# Patient Record
Sex: MALE | Race: WHITE | NOT HISPANIC OR LATINO | Employment: OTHER | URBAN - METROPOLITAN AREA
[De-identification: names, ages, dates, MRNs, and addresses within clinical notes are randomized per-mention and may not be internally consistent; named-entity substitution may affect disease eponyms.]

---

## 2017-01-18 RX ORDER — ACYCLOVIR 400 MG/1
TABLET ORAL
Qty: 30 TABLET | Refills: 0 | Status: SHIPPED | OUTPATIENT
Start: 2017-01-18 | End: 2017-02-17 | Stop reason: SDUPTHER

## 2017-01-18 RX ORDER — SERTRALINE HYDROCHLORIDE 100 MG/1
TABLET, FILM COATED ORAL
Qty: 30 TABLET | Refills: 0 | Status: SHIPPED | OUTPATIENT
Start: 2017-01-18 | End: 2017-02-17 | Stop reason: SDUPTHER

## 2017-02-17 ENCOUNTER — OFFICE VISIT (OUTPATIENT)
Dept: SLEEP MEDICINE | Facility: HOSPITAL | Age: 67
End: 2017-02-17

## 2017-02-17 VITALS
DIASTOLIC BLOOD PRESSURE: 72 MMHG | BODY MASS INDEX: 27.02 KG/M2 | WEIGHT: 182.4 LBS | HEART RATE: 71 BPM | HEIGHT: 69 IN | SYSTOLIC BLOOD PRESSURE: 156 MMHG | OXYGEN SATURATION: 91 %

## 2017-02-17 DIAGNOSIS — G47.34 NOCTURNAL HYPOXEMIA: ICD-10-CM

## 2017-02-17 DIAGNOSIS — G47.33 OBSTRUCTIVE SLEEP APNEA, ADULT: Primary | ICD-10-CM

## 2017-02-17 PROCEDURE — 99213 OFFICE O/P EST LOW 20 MIN: CPT | Performed by: INTERNAL MEDICINE

## 2017-02-17 RX ORDER — SERTRALINE HYDROCHLORIDE 100 MG/1
TABLET, FILM COATED ORAL
Qty: 30 TABLET | Refills: 0 | Status: SHIPPED | OUTPATIENT
Start: 2017-02-17 | End: 2017-03-03 | Stop reason: SDUPTHER

## 2017-02-17 RX ORDER — ASPIRIN 81 MG/1
81 TABLET ORAL NIGHTLY
COMMUNITY

## 2017-02-17 RX ORDER — ACYCLOVIR 400 MG/1
TABLET ORAL
Qty: 30 TABLET | Refills: 0 | Status: SHIPPED | OUTPATIENT
Start: 2017-02-17 | End: 2017-03-03 | Stop reason: SDUPTHER

## 2017-02-17 NOTE — PROGRESS NOTES
"Subjective   Enio Tapia is a 66 y.o. male is here today for follow-up.  He is seen here in follow-up of obstructive sleep apnea.  His primary care physician is Dr. Mckay.    History of Present Illness  The patient was last seen here July 1, 2016.  His previous sleep study did show severe obstructive sleep apnea with an AHI of 110.8.  He also had nocturnal hypoxemia and allergic rhinitis.  He is been doing fairly well since July.  He says he feels better on the CPAP.  He occasionally awakens with the mask leak and a dry mouth.  He's using a nasal mask.  He notes that it's difficult to use that when he has a lot of nasal congestion.  The following portions of the patient's history were reviewed and updated as appropriate: allergies, current medications and problem list.    Review of Systems   Constitutional: Negative.    HENT: Positive for congestion, postnasal drip and sinus pressure.    Eyes:        Has glaucoma     Respiratory: Positive for cough.    Cardiovascular: Positive for chest pain.   Gastrointestinal: Negative.    Endocrine: Positive for cold intolerance and heat intolerance.   Genitourinary: Negative.    Musculoskeletal: Positive for arthralgias and joint swelling.   Skin: Negative.    Allergic/Immunologic: Negative.    Neurological: Positive for headaches.   Hematological: Negative.    Psychiatric/Behavioral: Negative.     Coulter scores 19/24    Objective    Visit Vitals   • /72   • Pulse 71   • Ht 69\" (175.3 cm)   • Wt 182 lb 6.4 oz (82.7 kg)   • SpO2 91%   • BMI 26.94 kg/m2       Physical Exam   Constitutional: He is oriented to person, place, and time. He appears well-nourished.   He was overweight.   HENT:   Head: Normocephalic and atraumatic.   He had nasal airway narrowing with nasal septal deviation the left and Mallampati class II anatomy   Eyes: Pupils are equal, round, and reactive to light.   Neck: Normal range of motion. Neck supple.   Cardiovascular: Normal rate, regular rhythm " and normal heart sounds.    Pulmonary/Chest: Effort normal and breath sounds normal.   Abdominal: Soft. Bowel sounds are normal.   Musculoskeletal: Normal range of motion. He exhibits no edema.   Neurological: He is alert and oriented to person, place, and time.   Skin: Skin is warm and dry.   Psychiatric: He has a normal mood and affect. His behavior is normal.    download from his machine for the past 6 months shows he's used it 97.8% the time.  He's using it 6 hours 5 minutes per day.  His AHI remained slightly elevated 9.3 but is improved compared to his last visit.      Assessment/Plan   Enio was seen today for follow-up.    Diagnoses and all orders for this visit:    Obstructive sleep apnea, adult  -     CPAP Therapy    Nocturnal hypoxemia  -     Overnight Sleep Oximetry Study; Future     think the patient is getting better using his mask which is part of the reason that his AHI is lower this time.  We will continue on her current pressure settings.  He is encouraged to achieve ideal body weight.  We will renew his supplies.  We will also check an overnight oximetry to make certain that were correcting his nocturnal hypoxemia.  We will plan to see him in 1 year.  He is encouraged practice lateral position sleep he is encouraged to avoid alcohol and sedatives close to bedtime.    Jaskaran Romero MD [facility]  Sleep Medicine  Pulmonary and Critical Care Medicine

## 2017-02-17 NOTE — PATIENT INSTRUCTIONS
Sleep Apnea   Sleep apnea is a sleep disorder characterized by abnormal pauses in breathing while you sleep. When your breathing pauses, the level of oxygen in your blood decreases. This causes you to move out of deep sleep and into light sleep. As a result, your quality of sleep is poor, and the system that carries your blood throughout your body (cardiovascular system) experiences stress. If sleep apnea remains untreated, the following conditions can develop:  · High blood pressure (hypertension).  · Coronary artery disease.  · Inability to achieve or maintain an erection (impotence).  · Impairment of your thought process (cognitive dysfunction).  There are three types of sleep apnea:  1. Obstructive sleep apnea--Pauses in breathing during sleep because of a blocked airway.  2. Central sleep apnea--Pauses in breathing during sleep because the area of the brain that controls your breathing does not send the correct signals to the muscles that control breathing.  3. Mixed sleep apnea--A combination of both obstructive and central sleep apnea.  RISK FACTORS  The following risk factors can increase your risk of developing sleep apnea:  · Being overweight.  · Smoking.  · Having narrow passages in your nose and throat.  · Being of older age.  · Being male.  · Alcohol use.  · Sedative and tranquilizer use.  · Ethnicity. Among individuals younger than 35 years,  Americans are at increased risk of sleep apnea.  SYMPTOMS   · Difficulty staying asleep.  · Daytime sleepiness and fatigue.  · Loss of energy.  · Irritability.  · Loud, heavy snoring.  · Morning headaches.  · Trouble concentrating.  · Forgetfulness.  · Decreased interest in sex.  · Unexplained sleepiness.  DIAGNOSIS   In order to diagnose sleep apnea, your caregiver will perform a physical examination. A sleep study done in the comfort of your own home may be appropriate if you are otherwise healthy. Your caregiver may also recommend that you spend the  "night in a sleep lab. In the sleep lab, several monitors record information about your heart, lungs, and brain while you sleep. Your leg and arm movements and blood oxygen level are also recorded.  TREATMENT  The following actions may help to resolve mild sleep apnea:  · Sleeping on your side.    · Using a decongestant if you have nasal congestion.    · Avoiding the use of depressants, including alcohol, sedatives, and narcotics.    · Losing weight and modifying your diet if you are overweight.  There also are devices and treatments to help open your airway:  · Oral appliances. These are custom-made mouthpieces that shift your lower jaw forward and slightly open your bite. This opens your airway.  · Devices that create positive airway pressure. This positive pressure \"splints\" your airway open to help you breathe better during sleep. The following devices create positive airway pressure:    Continuous positive airway pressure (CPAP) device. The CPAP device creates a continuous level of air pressure with an air pump. The air is delivered to your airway through a mask while you sleep. This continuous pressure keeps your airway open.    Nasal expiratory positive airway pressure (EPAP) device. The EPAP device creates positive air pressure as you exhale. The device consists of single-use valves, which are inserted into each nostril and held in place by adhesive. The valves create very little resistance when you inhale but create much more resistance when you exhale. That increased resistance creates the positive airway pressure. This positive pressure while you exhale keeps your airway open, making it easier to breath when you inhale again.    Bilevel positive airway pressure (BPAP) device. The BPAP device is used mainly in patients with central sleep apnea. This device is similar to the CPAP device because it also uses an air pump to deliver continuous air pressure through a mask. However, with the BPAP machine, the " pressure is set at two different levels. The pressure when you exhale is lower than the pressure when you inhale.  · Surgery. Typically, surgery is only done if you cannot comply with less invasive treatments or if the less invasive treatments do not improve your condition. Surgery involves removing excess tissue in your airway to create a wider passage way.     This information is not intended to replace advice given to you by your health care provider. Make sure you discuss any questions you have with your health care provider.     Document Released: 12/08/2003 Document Revised: 01/08/2016 Document Reviewed: 09/26/2016  [employer] Interactive Patient Education ©2016 [employer]

## 2017-02-24 PROBLEM — B00.9 HERPES SIMPLEX INFECTION: Status: ACTIVE | Noted: 2017-02-24

## 2017-03-03 ENCOUNTER — OFFICE VISIT (OUTPATIENT)
Dept: FAMILY MEDICINE CLINIC | Facility: CLINIC | Age: 67
End: 2017-03-03

## 2017-03-03 VITALS
RESPIRATION RATE: 16 BRPM | DIASTOLIC BLOOD PRESSURE: 92 MMHG | SYSTOLIC BLOOD PRESSURE: 158 MMHG | HEART RATE: 65 BPM | BODY MASS INDEX: 26.6 KG/M2 | OXYGEN SATURATION: 96 % | HEIGHT: 69 IN | WEIGHT: 179.6 LBS | TEMPERATURE: 98.2 F

## 2017-03-03 DIAGNOSIS — Z00.00 WELL ADULT EXAM: Primary | ICD-10-CM

## 2017-03-03 DIAGNOSIS — I20.9 ANGINA PECTORIS (HCC): ICD-10-CM

## 2017-03-03 DIAGNOSIS — E78.49 OTHER HYPERLIPIDEMIA: ICD-10-CM

## 2017-03-03 DIAGNOSIS — I10 ESSENTIAL HYPERTENSION: ICD-10-CM

## 2017-03-03 LAB
ALBUMIN SERPL-MCNC: 4 G/DL (ref 3.2–4.8)
ALBUMIN/GLOB SERPL: 1.3 G/DL (ref 1.5–2.5)
ALP SERPL-CCNC: 59 U/L (ref 25–100)
ALT SERPL W P-5'-P-CCNC: 33 U/L (ref 7–40)
ANION GAP SERPL CALCULATED.3IONS-SCNC: 4 MMOL/L (ref 3–11)
ARTICHOKE IGE QN: 229 MG/DL (ref 0–130)
AST SERPL-CCNC: 27 U/L (ref 0–33)
BASOPHILS # BLD AUTO: 0.02 10*3/MM3 (ref 0–0.2)
BASOPHILS NFR BLD AUTO: 0.3 % (ref 0–1)
BILIRUB BLD-MCNC: NEGATIVE MG/DL
BILIRUB SERPL-MCNC: 0.6 MG/DL (ref 0.3–1.2)
BUN BLD-MCNC: 19 MG/DL (ref 9–23)
BUN/CREAT SERPL: 21.1 (ref 7–25)
CALCIUM SPEC-SCNC: 9.4 MG/DL (ref 8.7–10.4)
CHLORIDE SERPL-SCNC: 104 MMOL/L (ref 99–109)
CHOLEST SERPL-MCNC: 281 MG/DL (ref 0–200)
CLARITY, POC: CLEAR
CO2 SERPL-SCNC: 29 MMOL/L (ref 20–31)
COLOR UR: YELLOW
CREAT BLD-MCNC: 0.9 MG/DL (ref 0.6–1.3)
DEPRECATED RDW RBC AUTO: 46.1 FL (ref 37–54)
EOSINOPHIL # BLD AUTO: 0.16 10*3/MM3 (ref 0.1–0.3)
EOSINOPHIL NFR BLD AUTO: 2.8 % (ref 0–3)
ERYTHROCYTE [DISTWIDTH] IN BLOOD BY AUTOMATED COUNT: 13.5 % (ref 11.3–14.5)
GFR SERPL CREATININE-BSD FRML MDRD: 84 ML/MIN/1.73
GLOBULIN UR ELPH-MCNC: 3 GM/DL
GLUCOSE BLD-MCNC: 95 MG/DL (ref 70–100)
GLUCOSE UR STRIP-MCNC: NEGATIVE MG/DL
HCT VFR BLD AUTO: 42.8 % (ref 38.9–50.9)
HDLC SERPL-MCNC: 63 MG/DL (ref 40–60)
HGB BLD-MCNC: 14.5 G/DL (ref 13.1–17.5)
IMM GRANULOCYTES # BLD: 0.02 10*3/MM3 (ref 0–0.03)
IMM GRANULOCYTES NFR BLD: 0.3 % (ref 0–0.6)
KETONES UR QL: NEGATIVE
LEUKOCYTE EST, POC: NEGATIVE
LYMPHOCYTES # BLD AUTO: 1.15 10*3/MM3 (ref 0.6–4.8)
LYMPHOCYTES NFR BLD AUTO: 20.1 % (ref 24–44)
MCH RBC QN AUTO: 31.9 PG (ref 27–31)
MCHC RBC AUTO-ENTMCNC: 33.9 G/DL (ref 32–36)
MCV RBC AUTO: 94.1 FL (ref 80–99)
MONOCYTES # BLD AUTO: 0.48 10*3/MM3 (ref 0–1)
MONOCYTES NFR BLD AUTO: 8.4 % (ref 0–12)
NEUTROPHILS # BLD AUTO: 3.89 10*3/MM3 (ref 1.5–8.3)
NEUTROPHILS NFR BLD AUTO: 68.1 % (ref 41–71)
NITRITE UR-MCNC: NEGATIVE MG/ML
PH UR: 7.5 [PH] (ref 5–8)
PLATELET # BLD AUTO: 171 10*3/MM3 (ref 150–450)
PMV BLD AUTO: 9.4 FL (ref 6–12)
POTASSIUM BLD-SCNC: 4.5 MMOL/L (ref 3.5–5.5)
PROT SERPL-MCNC: 7 G/DL (ref 5.7–8.2)
PROT UR STRIP-MCNC: NEGATIVE MG/DL
PSA SERPL-MCNC: 4.6 NG/ML (ref 0–4)
RBC # BLD AUTO: 4.55 10*6/MM3 (ref 4.2–5.76)
RBC # UR STRIP: NEGATIVE /UL
SODIUM BLD-SCNC: 137 MMOL/L (ref 132–146)
SP GR UR: 1.02 (ref 1–1.03)
TRIGL SERPL-MCNC: 108 MG/DL (ref 0–150)
TSH SERPL DL<=0.05 MIU/L-ACNC: 4.11 MIU/ML (ref 0.35–5.35)
UROBILINOGEN UR QL: NORMAL
WBC NRBC COR # BLD: 5.72 10*3/MM3 (ref 3.5–10.8)

## 2017-03-03 PROCEDURE — 85025 COMPLETE CBC W/AUTO DIFF WBC: CPT | Performed by: FAMILY MEDICINE

## 2017-03-03 PROCEDURE — 80061 LIPID PANEL: CPT | Performed by: FAMILY MEDICINE

## 2017-03-03 PROCEDURE — 81003 URINALYSIS AUTO W/O SCOPE: CPT | Performed by: FAMILY MEDICINE

## 2017-03-03 PROCEDURE — 36415 COLL VENOUS BLD VENIPUNCTURE: CPT | Performed by: FAMILY MEDICINE

## 2017-03-03 PROCEDURE — 80053 COMPREHEN METABOLIC PANEL: CPT | Performed by: FAMILY MEDICINE

## 2017-03-03 PROCEDURE — 84153 ASSAY OF PSA TOTAL: CPT | Performed by: FAMILY MEDICINE

## 2017-03-03 PROCEDURE — 99397 PER PM REEVAL EST PAT 65+ YR: CPT | Performed by: FAMILY MEDICINE

## 2017-03-03 PROCEDURE — 84443 ASSAY THYROID STIM HORMONE: CPT | Performed by: FAMILY MEDICINE

## 2017-03-03 PROCEDURE — 93000 ELECTROCARDIOGRAM COMPLETE: CPT | Performed by: FAMILY MEDICINE

## 2017-03-03 RX ORDER — ACYCLOVIR 400 MG/1
400 TABLET ORAL DAILY
Qty: 30 TABLET | Refills: 5 | Status: SHIPPED | OUTPATIENT
Start: 2017-03-03 | End: 2017-10-15 | Stop reason: SDUPTHER

## 2017-03-03 RX ORDER — EZETIMIBE 10 MG/1
10 TABLET ORAL DAILY
Qty: 90 TABLET | Refills: 1 | Status: SHIPPED | OUTPATIENT
Start: 2017-03-03 | End: 2017-03-31

## 2017-03-03 RX ORDER — SERTRALINE HYDROCHLORIDE 100 MG/1
100 TABLET, FILM COATED ORAL DAILY
Qty: 30 TABLET | Refills: 5 | Status: SHIPPED | OUTPATIENT
Start: 2017-03-03 | End: 2017-05-12

## 2017-03-03 RX ORDER — LOSARTAN POTASSIUM 50 MG/1
50 TABLET ORAL DAILY
Qty: 90 TABLET | Refills: 1 | Status: SHIPPED | OUTPATIENT
Start: 2017-03-03 | End: 2017-09-06 | Stop reason: SDUPTHER

## 2017-03-03 NOTE — PROGRESS NOTES
"Subjective   Enio Tapia is a 66 y.o. male and is here for a comprehensive physical exam. The patient reports problems - anginal type symptoms.        Are you taking aspirin daily? yes        The following portions of the patient's history were reviewed and updated as appropriate: allergies, current medications, past family history, past medical history, past social history, past surgical history and problem list.    Review of Systems  Do you have pain that bothers you in your daily life? no  A comprehensive review of systems was negative.    Objective   Visit Vitals   • /92   • Pulse 65   • Temp 98.2 °F (36.8 °C)   • Resp 16   • Ht 69\" (175.3 cm)   • Wt 179 lb 9.6 oz (81.5 kg)   • SpO2 96%   • BMI 26.52 kg/m2       General Appearance:    Alert, cooperative, no distress, appears stated age   Head:    Normocephalic, without obvious abnormality, atraumatic   Eyes:    PERRL, conjunctiva/corneas clear, EOM's intact, fundi     benign, both eyes        Ears:    Normal TM's and external ear canals, both ears   Nose:   Nares normal, septum midline, mucosa normal, no drainage    or sinus tenderness   Throat:   Lips, mucosa, and tongue normal; teeth and gums normal   Neck:   Supple, symmetrical, trachea midline, no adenopathy;        thyroid:  No enlargement/tenderness/nodules; no carotid    bruit or JVD   Back:     Symmetric, no curvature, ROM normal, no CVA tenderness   Lungs:     Clear to auscultation bilaterally, respirations unlabored   Chest wall:    No tenderness or deformity   Heart:    Regular rate and rhythm, S1 and S2 normal, no murmur, rub   or gallop   Abdomen:     Soft, non-tender, bowel sounds active all four quadrants,     no masses, no organomegaly   Genitalia:    Normal male without lesion, discharge or tenderness   Rectal:    deferred   Extremities:   Extremities normal, atraumatic, no cyanosis or edema bunions moderate   Pulses:   2+ and symmetric all extremities   Skin:   Skin color, texture, " turgor normal, no rashes or lesions nail mycosis at edges bid toenail right    Lymph nodes:   Cervical, supraclavicular, and axillary nodes normal   Neurologic:   CNII-XII intact. Normal strength, sensation and reflexes       throughout        Assessment/Plan   Healthy male exam.       1. Hypertension, hyperlipidemia, hypothyroidism, general anxiety disorder, and coronary artery disease.  2. Patient Counseling:  --Nutrition: Stressed importance of moderation in sodium/caffeine intake, saturated fat and cholesterol, caloric balance, sufficient intake of fresh fruits, vegetables, fiber, calcium, iron, and 1 mg of folate supplement per day (for females capable of pregnancy).  --Discussed the issue of estrogen replacement, calcium supplement, and the daily use of baby aspirin.  --Exercise: Stressed the importance of regular exercise.   --Substance Abuse: Discussed cessation/primary prevention of tobacco, alcohol, or other drug use; driving or other dangerous activities under the influence; availability of treatment for abuse.    --Sexuality: Discussed sexually transmitted diseases, partner selection, use of condoms, avoidance of unintended pregnancy  and contraceptive alternatives.   --Injury prevention: Discussed safety belts, safety helmets, smoke detector, smoking near bedding or upholstery.   --Dental health: Discussed importance of regular tooth brushing, flossing, and dental visits.  --Immunizations reviewed.  --Discussed benefits of screening colonoscopy.  --After hours service discussed with patient    3. Discussed the patient's BMI with him.  The BMI is in the acceptable range  4. Follow up in 3 months      ECG 12 Lead  Date/Time: 3/3/2017 4:56 PM  Performed by: SHAINA MURGUIA  Authorized by: SHAINA MURGUIA   Comparison: not compared with previous ECG   Rhythm: sinus rhythm  Rate: normal  BPM: 58  ST Segments: ST segments normal  T flattening: II, III and aVF  QRS axis: left  Other: no other  findings  Clinical impression: non-specific ECG  Comments: Angina          See cpx form

## 2017-03-31 ENCOUNTER — APPOINTMENT (OUTPATIENT)
Dept: CARDIOLOGY | Facility: HOSPITAL | Age: 67
End: 2017-03-31
Attending: INTERNAL MEDICINE

## 2017-03-31 ENCOUNTER — CONSULT (OUTPATIENT)
Dept: CARDIOLOGY | Facility: CLINIC | Age: 67
End: 2017-03-31

## 2017-03-31 ENCOUNTER — HOSPITAL ENCOUNTER (OUTPATIENT)
Facility: HOSPITAL | Age: 67
Setting detail: OBSERVATION
Discharge: HOME | End: 2017-04-01
Attending: INTERNAL MEDICINE | Admitting: INTERNAL MEDICINE

## 2017-03-31 ENCOUNTER — PREP FOR SURGERY (OUTPATIENT)
Dept: CARDIOLOGY | Facility: CLINIC | Age: 67
End: 2017-03-31

## 2017-03-31 VITALS
WEIGHT: 182 LBS | HEART RATE: 61 BPM | BODY MASS INDEX: 26.96 KG/M2 | SYSTOLIC BLOOD PRESSURE: 146 MMHG | DIASTOLIC BLOOD PRESSURE: 94 MMHG | HEIGHT: 69 IN

## 2017-03-31 DIAGNOSIS — E78.2 MIXED HYPERLIPIDEMIA: ICD-10-CM

## 2017-03-31 DIAGNOSIS — I10 ESSENTIAL HYPERTENSION: ICD-10-CM

## 2017-03-31 DIAGNOSIS — I25.10 CORONARY ARTERY DISEASE INVOLVING NATIVE CORONARY ARTERY OF NATIVE HEART WITHOUT ANGINA PECTORIS: Primary | ICD-10-CM

## 2017-03-31 DIAGNOSIS — I25.110 CORONARY ARTERY DISEASE INVOLVING NATIVE CORONARY ARTERY OF NATIVE HEART WITH UNSTABLE ANGINA PECTORIS (HCC): ICD-10-CM

## 2017-03-31 DIAGNOSIS — I25.110 CORONARY ARTERY DISEASE INVOLVING NATIVE CORONARY ARTERY OF NATIVE HEART WITH UNSTABLE ANGINA PECTORIS (HCC): Primary | ICD-10-CM

## 2017-03-31 LAB
ANION GAP SERPL CALCULATED.3IONS-SCNC: 5 MMOL/L (ref 3–11)
ARTICHOKE IGE QN: 161 MG/DL (ref 0–130)
BH CV ECHO MEAS - AO ROOT AREA (BSA CORRECTED): 1.8
BH CV ECHO MEAS - AO ROOT AREA: 10.2 CM^2
BH CV ECHO MEAS - AO ROOT DIAM: 3.6 CM
BH CV ECHO MEAS - BSA(HAYCOCK): 2 M^2
BH CV ECHO MEAS - BSA: 2 M^2
BH CV ECHO MEAS - BZI_BMI: 26.6 KILOGRAMS/M^2
BH CV ECHO MEAS - BZI_METRIC_HEIGHT: 175.3 CM
BH CV ECHO MEAS - BZI_METRIC_WEIGHT: 81.6 KG
BH CV ECHO MEAS - CONTRAST EF (2CH): 55.1 ML/M^2
BH CV ECHO MEAS - CONTRAST EF 4CH: 55.7 ML/M^2
BH CV ECHO MEAS - EDV(CUBED): 148.9 ML
BH CV ECHO MEAS - EDV(MOD-SP2): 98 ML
BH CV ECHO MEAS - EDV(MOD-SP4): 97 ML
BH CV ECHO MEAS - EDV(TEICH): 135.3 ML
BH CV ECHO MEAS - EF(CUBED): 56.4 %
BH CV ECHO MEAS - EF(MOD-SP2): 55.1 %
BH CV ECHO MEAS - EF(MOD-SP4): 55.7 %
BH CV ECHO MEAS - EF(TEICH): 47.7 %
BH CV ECHO MEAS - ESV(CUBED): 65 ML
BH CV ECHO MEAS - ESV(MOD-SP2): 44 ML
BH CV ECHO MEAS - ESV(MOD-SP4): 43 ML
BH CV ECHO MEAS - ESV(TEICH): 70.8 ML
BH CV ECHO MEAS - FS: 24.2 %
BH CV ECHO MEAS - IVS/LVPW: 1
BH CV ECHO MEAS - IVSD: 1.1 CM
BH CV ECHO MEAS - LA DIMENSION: 4.4 CM
BH CV ECHO MEAS - LA/AO: 1.2
BH CV ECHO MEAS - LAT PEAK E' VEL: 7.5 CM/SEC
BH CV ECHO MEAS - LV DIASTOLIC VOL/BSA (35-75): 49.1 ML/M^2
BH CV ECHO MEAS - LV MASS(C)D: 223.5 GRAMS
BH CV ECHO MEAS - LV MASS(C)DI: 113.2 GRAMS/M^2
BH CV ECHO MEAS - LV MAX PG: 1.9 MMHG
BH CV ECHO MEAS - LV MEAN PG: 1 MMHG
BH CV ECHO MEAS - LV SYSTOLIC VOL/BSA (12-30): 21.8 ML/M^2
BH CV ECHO MEAS - LV V1 MAX: 69.2 CM/SEC
BH CV ECHO MEAS - LV V1 MEAN: 44.4 CM/SEC
BH CV ECHO MEAS - LV V1 VTI: 18.3 CM
BH CV ECHO MEAS - LVIDD: 5.3 CM
BH CV ECHO MEAS - LVIDS: 4 CM
BH CV ECHO MEAS - LVLD AP2: 7.7 CM
BH CV ECHO MEAS - LVLD AP4: 8.1 CM
BH CV ECHO MEAS - LVLS AP2: 6.2 CM
BH CV ECHO MEAS - LVLS AP4: 6.5 CM
BH CV ECHO MEAS - LVOT AREA (M): 3.5 CM^2
BH CV ECHO MEAS - LVOT AREA: 3.5 CM^2
BH CV ECHO MEAS - LVOT DIAM: 2.1 CM
BH CV ECHO MEAS - LVPWD: 1.1 CM
BH CV ECHO MEAS - MED PEAK E' VEL: 7.25 CM/SEC
BH CV ECHO MEAS - MV A MAX VEL: 79 CM/SEC
BH CV ECHO MEAS - MV E MAX VEL: 53.8 CM/SEC
BH CV ECHO MEAS - MV E/A: 0.68
BH CV ECHO MEAS - PA ACC SLOPE: 732 CM/SEC^2
BH CV ECHO MEAS - PA ACC TIME: 0.09 SEC
BH CV ECHO MEAS - PA PR(ACCEL): 37.6 MMHG
BH CV ECHO MEAS - RVDD: 2.8 CM
BH CV ECHO MEAS - SI(CUBED): 42.5 ML/M^2
BH CV ECHO MEAS - SI(LVOT): 32.1 ML/M^2
BH CV ECHO MEAS - SI(MOD-SP2): 27.3 ML/M^2
BH CV ECHO MEAS - SI(MOD-SP4): 27.3 ML/M^2
BH CV ECHO MEAS - SI(TEICH): 32.7 ML/M^2
BH CV ECHO MEAS - SV(CUBED): 83.9 ML
BH CV ECHO MEAS - SV(LVOT): 63.4 ML
BH CV ECHO MEAS - SV(MOD-SP2): 54 ML
BH CV ECHO MEAS - SV(MOD-SP4): 54 ML
BH CV ECHO MEAS - SV(TEICH): 64.5 ML
BH CV ECHO MEAS - TAPSE (>1.6): 2.3 CM2
BH CV XLRA - RV BASE: 2.7 CM
BH CV XLRA - RV LENGTH: 6.7 CM
BH CV XLRA - RV MID: 3 CM
BH CV XLRA - TDI S': 15.8 CM/SEC
BUN BLD-MCNC: 14 MG/DL (ref 9–23)
BUN/CREAT SERPL: 17.5 (ref 7–25)
CALCIUM SPEC-SCNC: 9.3 MG/DL (ref 8.7–10.4)
CHLORIDE SERPL-SCNC: 108 MMOL/L (ref 99–109)
CHOLEST SERPL-MCNC: 229 MG/DL (ref 0–200)
CO2 SERPL-SCNC: 25 MMOL/L (ref 20–31)
CREAT BLD-MCNC: 0.8 MG/DL (ref 0.6–1.3)
DEPRECATED RDW RBC AUTO: 46 FL (ref 37–54)
E/E' RATIO: 7.2
ERYTHROCYTE [DISTWIDTH] IN BLOOD BY AUTOMATED COUNT: 13.3 % (ref 11.3–14.5)
GFR SERPL CREATININE-BSD FRML MDRD: 96 ML/MIN/1.73
GLUCOSE BLD-MCNC: 96 MG/DL (ref 70–100)
HBA1C MFR BLD: 5.7 % (ref 4.8–5.6)
HCT VFR BLD AUTO: 40.4 % (ref 38.9–50.9)
HDLC SERPL-MCNC: 53 MG/DL (ref 40–60)
HGB BLD-MCNC: 13.8 G/DL (ref 13.1–17.5)
LV EF 2D ECHO EST: 55 %
MCH RBC QN AUTO: 32.3 PG (ref 27–31)
MCHC RBC AUTO-ENTMCNC: 34.2 G/DL (ref 32–36)
MCV RBC AUTO: 94.6 FL (ref 80–99)
PLATELET # BLD AUTO: 148 10*3/MM3 (ref 150–450)
PMV BLD AUTO: 8.9 FL (ref 6–12)
POTASSIUM BLD-SCNC: 4.2 MMOL/L (ref 3.5–5.5)
RBC # BLD AUTO: 4.27 10*6/MM3 (ref 4.2–5.76)
SODIUM BLD-SCNC: 138 MMOL/L (ref 132–146)
TRIGL SERPL-MCNC: 114 MG/DL (ref 0–150)
WBC NRBC COR # BLD: 3.68 10*3/MM3 (ref 3.5–10.8)

## 2017-03-31 PROCEDURE — 92978 ENDOLUMINL IVUS OCT C 1ST: CPT | Performed by: INTERNAL MEDICINE

## 2017-03-31 PROCEDURE — 80048 BASIC METABOLIC PNL TOTAL CA: CPT | Performed by: INTERNAL MEDICINE

## 2017-03-31 PROCEDURE — 85027 COMPLETE CBC AUTOMATED: CPT | Performed by: INTERNAL MEDICINE

## 2017-03-31 PROCEDURE — 93306 TTE W/DOPPLER COMPLETE: CPT | Performed by: INTERNAL MEDICINE

## 2017-03-31 PROCEDURE — 25010000002 BIVALIRUDIN PER 1 MG: Performed by: INTERNAL MEDICINE

## 2017-03-31 PROCEDURE — 25010000002 FENTANYL CITRATE (PF) 100 MCG/2ML SOLUTION: Performed by: INTERNAL MEDICINE

## 2017-03-31 PROCEDURE — 93306 TTE W/DOPPLER COMPLETE: CPT

## 2017-03-31 PROCEDURE — C1874 STENT, COATED/COV W/DEL SYS: HCPCS | Performed by: INTERNAL MEDICINE

## 2017-03-31 PROCEDURE — C1725 CATH, TRANSLUMIN NON-LASER: HCPCS | Performed by: INTERNAL MEDICINE

## 2017-03-31 PROCEDURE — C1894 INTRO/SHEATH, NON-LASER: HCPCS | Performed by: INTERNAL MEDICINE

## 2017-03-31 PROCEDURE — C1753 CATH, INTRAVAS ULTRASOUND: HCPCS | Performed by: INTERNAL MEDICINE

## 2017-03-31 PROCEDURE — 25010000002 MIDAZOLAM PER 1 MG: Performed by: INTERNAL MEDICINE

## 2017-03-31 PROCEDURE — 36415 COLL VENOUS BLD VENIPUNCTURE: CPT

## 2017-03-31 PROCEDURE — 80061 LIPID PANEL: CPT | Performed by: INTERNAL MEDICINE

## 2017-03-31 PROCEDURE — C1887 CATHETER, GUIDING: HCPCS | Performed by: INTERNAL MEDICINE

## 2017-03-31 PROCEDURE — 92929 PR PRQ TRLUML CORONARY STENT W/ANGIO ADDL ART/BRNCH: CPT | Performed by: INTERNAL MEDICINE

## 2017-03-31 PROCEDURE — G0378 HOSPITAL OBSERVATION PER HR: HCPCS

## 2017-03-31 PROCEDURE — 25010000002 HEPARIN (PORCINE) PER 1000 UNITS: Performed by: INTERNAL MEDICINE

## 2017-03-31 PROCEDURE — 93458 L HRT ARTERY/VENTRICLE ANGIO: CPT | Performed by: INTERNAL MEDICINE

## 2017-03-31 PROCEDURE — 99243 OFF/OP CNSLTJ NEW/EST LOW 30: CPT | Performed by: INTERNAL MEDICINE

## 2017-03-31 PROCEDURE — C1769 GUIDE WIRE: HCPCS | Performed by: INTERNAL MEDICINE

## 2017-03-31 PROCEDURE — C9601 PERC DRUG-EL COR STENT BRAN: HCPCS | Performed by: INTERNAL MEDICINE

## 2017-03-31 PROCEDURE — C9600 PERC DRUG-EL COR STENT SING: HCPCS | Performed by: INTERNAL MEDICINE

## 2017-03-31 PROCEDURE — 0 IOPAMIDOL PER 1 ML: Performed by: INTERNAL MEDICINE

## 2017-03-31 PROCEDURE — 92928 PRQ TCAT PLMT NTRAC ST 1 LES: CPT | Performed by: INTERNAL MEDICINE

## 2017-03-31 PROCEDURE — 83036 HEMOGLOBIN GLYCOSYLATED A1C: CPT | Performed by: INTERNAL MEDICINE

## 2017-03-31 DEVICE — XIENCE ALPINE EVEROLIMUS ELUTING CORONARY STENT SYSTEM 3.00 MM X 33 MM / RAPID-EXCHANGE
Type: IMPLANTABLE DEVICE | Status: FUNCTIONAL
Brand: XIENCE ALPINE

## 2017-03-31 DEVICE — XIENCE ALPINE EVEROLIMUS ELUTING CORONARY STENT SYSTEM 2.25 MM X 15 MM / RAPID-EXCHANGE
Type: IMPLANTABLE DEVICE | Status: FUNCTIONAL
Brand: XIENCE ALPINE

## 2017-03-31 RX ORDER — NITROGLYCERIN 0.4 MG/1
0.4 TABLET SUBLINGUAL
Status: CANCELLED | OUTPATIENT
Start: 2017-03-31

## 2017-03-31 RX ORDER — SODIUM CHLORIDE 0.9 % (FLUSH) 0.9 %
1-10 SYRINGE (ML) INJECTION AS NEEDED
Status: CANCELLED | OUTPATIENT
Start: 2017-03-31

## 2017-03-31 RX ORDER — ACETAMINOPHEN 325 MG/1
650 TABLET ORAL EVERY 6 HOURS PRN
Status: DISCONTINUED | OUTPATIENT
Start: 2017-03-31 | End: 2017-04-01 | Stop reason: HOSPADM

## 2017-03-31 RX ORDER — ONDANSETRON 2 MG/ML
4 INJECTION INTRAMUSCULAR; INTRAVENOUS EVERY 6 HOURS PRN
Status: CANCELLED | OUTPATIENT
Start: 2017-03-31

## 2017-03-31 RX ORDER — ASPIRIN 81 MG/1
81 TABLET, CHEWABLE ORAL DAILY
Status: DISCONTINUED | OUTPATIENT
Start: 2017-04-01 | End: 2017-04-01 | Stop reason: HOSPADM

## 2017-03-31 RX ORDER — CLOPIDOGREL BISULFATE 75 MG/1
75 TABLET ORAL DAILY
Status: DISCONTINUED | OUTPATIENT
Start: 2017-04-01 | End: 2017-04-01 | Stop reason: HOSPADM

## 2017-03-31 RX ORDER — CLOPIDOGREL BISULFATE 75 MG/1
TABLET ORAL AS NEEDED
Status: DISCONTINUED | OUTPATIENT
Start: 2017-03-31 | End: 2017-03-31 | Stop reason: HOSPADM

## 2017-03-31 RX ORDER — ASPIRIN 325 MG
TABLET ORAL AS NEEDED
Status: DISCONTINUED | OUTPATIENT
Start: 2017-03-31 | End: 2017-03-31 | Stop reason: HOSPADM

## 2017-03-31 RX ORDER — ACETAMINOPHEN 325 MG/1
650 TABLET ORAL EVERY 4 HOURS PRN
Status: DISCONTINUED | OUTPATIENT
Start: 2017-03-31 | End: 2017-03-31 | Stop reason: HOSPADM

## 2017-03-31 RX ORDER — TRAVOPROST OPHTHALMIC SOLUTION 0.04 MG/ML
1 SOLUTION OPHTHALMIC NIGHTLY
Status: DISCONTINUED | OUTPATIENT
Start: 2017-03-31 | End: 2017-04-01 | Stop reason: HOSPADM

## 2017-03-31 RX ORDER — NITROGLYCERIN 5 MG/ML
INJECTION, SOLUTION INTRAVENOUS AS NEEDED
Status: DISCONTINUED | OUTPATIENT
Start: 2017-03-31 | End: 2017-03-31 | Stop reason: HOSPADM

## 2017-03-31 RX ORDER — HYDROCODONE BITARTRATE AND ACETAMINOPHEN 5; 325 MG/1; MG/1
1 TABLET ORAL EVERY 4 HOURS PRN
Status: CANCELLED | OUTPATIENT
Start: 2017-03-31 | End: 2017-04-10

## 2017-03-31 RX ORDER — SERTRALINE HYDROCHLORIDE 100 MG/1
100 TABLET, FILM COATED ORAL DAILY
Status: DISCONTINUED | OUTPATIENT
Start: 2017-03-31 | End: 2017-04-01 | Stop reason: HOSPADM

## 2017-03-31 RX ORDER — ACYCLOVIR 200 MG/1
400 CAPSULE ORAL DAILY
Status: DISCONTINUED | OUTPATIENT
Start: 2017-03-31 | End: 2017-04-01 | Stop reason: HOSPADM

## 2017-03-31 RX ORDER — ACETAMINOPHEN 325 MG/1
650 TABLET ORAL EVERY 4 HOURS PRN
Status: CANCELLED | OUTPATIENT
Start: 2017-03-31

## 2017-03-31 RX ORDER — ONDANSETRON 2 MG/ML
4 INJECTION INTRAMUSCULAR; INTRAVENOUS EVERY 6 HOURS PRN
Status: DISCONTINUED | OUTPATIENT
Start: 2017-03-31 | End: 2017-03-31 | Stop reason: HOSPADM

## 2017-03-31 RX ORDER — SODIUM CHLORIDE 0.9 % (FLUSH) 0.9 %
1-10 SYRINGE (ML) INJECTION AS NEEDED
Status: DISCONTINUED | OUTPATIENT
Start: 2017-03-31 | End: 2017-03-31 | Stop reason: HOSPADM

## 2017-03-31 RX ORDER — LIDOCAINE HYDROCHLORIDE 10 MG/ML
INJECTION, SOLUTION INFILTRATION; PERINEURAL AS NEEDED
Status: DISCONTINUED | OUTPATIENT
Start: 2017-03-31 | End: 2017-03-31 | Stop reason: HOSPADM

## 2017-03-31 RX ORDER — LEVOTHYROXINE SODIUM 0.15 MG/1
150 TABLET ORAL DAILY
Status: DISCONTINUED | OUTPATIENT
Start: 2017-03-31 | End: 2017-04-01 | Stop reason: HOSPADM

## 2017-03-31 RX ORDER — FENTANYL CITRATE 50 UG/ML
INJECTION, SOLUTION INTRAMUSCULAR; INTRAVENOUS AS NEEDED
Status: DISCONTINUED | OUTPATIENT
Start: 2017-03-31 | End: 2017-03-31 | Stop reason: HOSPADM

## 2017-03-31 RX ORDER — MIDAZOLAM HYDROCHLORIDE 1 MG/ML
INJECTION INTRAMUSCULAR; INTRAVENOUS AS NEEDED
Status: DISCONTINUED | OUTPATIENT
Start: 2017-03-31 | End: 2017-03-31 | Stop reason: HOSPADM

## 2017-03-31 RX ORDER — LOSARTAN POTASSIUM 50 MG/1
50 TABLET ORAL DAILY
Status: DISCONTINUED | OUTPATIENT
Start: 2017-04-01 | End: 2017-04-01 | Stop reason: HOSPADM

## 2017-03-31 RX ORDER — HYDROCODONE BITARTRATE AND ACETAMINOPHEN 5; 325 MG/1; MG/1
1 TABLET ORAL EVERY 4 HOURS PRN
Status: DISCONTINUED | OUTPATIENT
Start: 2017-03-31 | End: 2017-03-31 | Stop reason: HOSPADM

## 2017-03-31 RX ORDER — NITROGLYCERIN 0.4 MG/1
0.4 TABLET SUBLINGUAL
Status: DISCONTINUED | OUTPATIENT
Start: 2017-03-31 | End: 2017-03-31 | Stop reason: HOSPADM

## 2017-03-31 RX ADMIN — ACYCLOVIR 400 MG: 200 CAPSULE ORAL at 16:58

## 2017-03-31 RX ADMIN — TRAVOPROST 1 DROP: 0.04 SOLUTION/ DROPS OPHTHALMIC at 20:17

## 2017-03-31 RX ADMIN — LEVOTHYROXINE SODIUM 150 MCG: 150 TABLET ORAL at 16:58

## 2017-03-31 RX ADMIN — SERTRALINE HYDROCHLORIDE 100 MG: 100 TABLET, FILM COATED ORAL at 16:58

## 2017-03-31 RX ADMIN — ACETAMINOPHEN 650 MG: 325 TABLET, FILM COATED ORAL at 17:14

## 2017-03-31 NOTE — PROGRESS NOTES
"Enio Tapia is a 66 y.o. male.    Patient Active Problem List   Diagnosis   • Hyperlipidemia   • Hypertension   • Hypothyroidism   • Irritable bowel syndrome   • Sleep apnea   • Generalized anxiety disorder   • Inguinal hernia   • Herpes simplex infection        Chief Complaint:    History of Present Illness     {Common H&P Review Areas:40718}    Allergies   Allergen Reactions   • Statins Myalgia         Current Outpatient Prescriptions:   •  acyclovir (ZOVIRAX) 400 MG tablet, Take 1 tablet by mouth Daily. Take no more than 5 doses a day., Disp: 30 tablet, Rfl: 5  •  aspirin 81 MG EC tablet, Take 81 mg by mouth Daily., Disp: , Rfl:   •  levothyroxine (SYNTHROID, LEVOTHROID) 150 MCG tablet, Take 1 tablet by mouth daily., Disp: 90 tablet, Rfl: 3  •  losartan (COZAAR) 50 MG tablet, Take 1 tablet by mouth Daily., Disp: 90 tablet, Rfl: 1  •  sertraline (ZOLOFT) 100 MG tablet, Take 1 tablet by mouth Daily., Disp: 30 tablet, Rfl: 5  •  travoprost, PAUL free, (TRAVATAN) 0.004 % solution ophthalmic solution, 1 drop every evening. in affected eye(s), Disp: , Rfl:     ROS    Objective      Blood pressure 146/94, pulse 61, height 69\" (175.3 cm), weight 182 lb (82.6 kg).    Physical Exam    Procedures      Assessment:    No diagnosis found.       Plan:      "

## 2017-03-31 NOTE — PROGRESS NOTES
Encounter Date:03/31/2017    Location: [facility]    Ref: Scot Mckay MD    Patient ID: Enio Tapia is a 66 y.o. male resident of [place], [place].    1950  Subjective:      Chief Complaint/Reason for visit:    Chief Complaint   Patient presents with   • Coronary Artery Disease   • Hypertension       Problem List:  1. Coronary Artery DIsease      A. S/P 3.5 X 32 mm Taxus JOHANA mid RCA lesion 10/15/2004      B. Normal Stress echo 2/1/10      C. Echocardiogram 2/1/10 revealing Nl LVEF 60 %, trace TR, trace MR      D. Chest pressure and shortness of breath with exertional activities X 6 months  2. Hypertension  3. Hyperlipidemia  4. Hypothyroidism  5. Anxiety Disorder  6. Glaucoma  7. Osteoarthritis  8. Obstructive Sleep Apnea with CPAP compliance      HPI:  Mr. Tapia is a 66 yr old white male with the above noted medical history who presents in consultation referred by Scot Mckay for further evaluation of his coronary artery disease, hypertension and hyperlipidemia.  Mr. Tapia states that for the last 6 months if he exerts himself by walking briskly he will develop chest pressure with associated shortness of breath.  These symptoms are similar to what he had prior to his stent placement in 2004, but not as severe. He also notes mild chest discomfort at rest on a daily basis and occasional sharp pains with exertion that radiate into his left elbow.  His blood pressure has not been well controlled over the last several months and his Losartan was increased from 25 mg to 50 mg 3 weeks ago.  Since that time his systolic has decreased from the 150's to the 140's, but his diastolic has remained in the upper 80's to 90's. He has been intolerant to high dose Lipitor and Crestor in the past and is not currently on a statin.   Of our evaluation in the office of his complaining of localized precordial chest discomfort which he reports is mild.  States that he has been getting used to it lately and  experiences it off and on without any exertion.  However if he exerts his discomfort gets much worse.  He has not taken any nitroglycerin.      Social History     Social History   • Marital status:      Spouse name: N/A   • Number of children: N/A   • Years of education: N/A     Occupational History   • Not on file.     Social History Main Topics   • Smoking status: Never Smoker   • Smokeless tobacco: Former User     Types: Chew, Snuff     Quit date: 01/2002   • Alcohol use 7.2 oz/week     12 Cans of beer per week   • Drug use: No   • Sexual activity: Not on file     Other Topics Concern   • Not on file     Social History Narrative       family history includes Heart disease in his mother; Heart failure in his mother; Pneumonia in his father.     has a past medical history of Arrhythmia; Arthritis; Coronary artery disease; Coronary artery disease involving native coronary artery of native heart with unstable angina pectoris (3/31/2017); Disease of thyroid gland; Hyperlipidemia; Hypertension; and Mental disorder.    Allergies   Allergen Reactions   • Statins Myalgia         Current Outpatient Prescriptions:   •  acyclovir (ZOVIRAX) 400 MG tablet, Take 1 tablet by mouth Daily. Take no more than 5 doses a day., Disp: 30 tablet, Rfl: 5  •  aspirin 81 MG EC tablet, Take 81 mg by mouth Daily., Disp: , Rfl:   •  levothyroxine (SYNTHROID, LEVOTHROID) 150 MCG tablet, Take 1 tablet by mouth daily., Disp: 90 tablet, Rfl: 3  •  losartan (COZAAR) 50 MG tablet, Take 1 tablet by mouth Daily., Disp: 90 tablet, Rfl: 1  •  sertraline (ZOLOFT) 100 MG tablet, Take 1 tablet by mouth Daily., Disp: 30 tablet, Rfl: 5  •  travoprost, PAUL free, (TRAVATAN) 0.004 % solution ophthalmic solution, 1 drop every evening. in affected eye(s), Disp: , Rfl:     Review of Systems   Constitution: Negative.   HENT: Negative.    Eyes:        Glaucoma   Cardiovascular: Positive for chest pain and dyspnea on exertion. Negative for claudication,  cyanosis, irregular heartbeat, leg swelling, near-syncope, orthopnea, palpitations, paroxysmal nocturnal dyspnea and syncope.   Respiratory: Negative.    Endocrine: Negative.    Hematologic/Lymphatic: Negative.    Skin: Negative.    Musculoskeletal: Positive for arthritis and joint pain.   Gastrointestinal: Positive for diarrhea.   Genitourinary: Negative.    Neurological: Negative.    Psychiatric/Behavioral: Positive for depression. The patient is nervous/anxious.    Allergic/Immunologic: Negative.        Vitals:    03/31/17 0837   BP: 146/94   Pulse: 61   weight 182 lbs  BMI 26.9      Objective:       Physical Exam   Constitutional: He is oriented to person, place, and time. He appears well-developed and well-nourished.   HENT:   Head: Normocephalic and atraumatic.   Neck: Normal range of motion. No thyromegaly present.   Cardiovascular: Normal rate and regular rhythm.  Exam reveals no gallop and no friction rub.    No murmur heard.  Pulmonary/Chest: Effort normal and breath sounds normal.   Abdominal: Soft. Bowel sounds are normal.   Musculoskeletal: Normal range of motion. He exhibits no edema.   Neurological: He is alert and oriented to person, place, and time.   Skin: Skin is warm and dry.   Psychiatric: He has a normal mood and affect. His behavior is normal. Judgment and thought content normal.   Nursing note and vitals reviewed.      Data Review:     ECG 12 Lead  Date/Time: 3/31/2017 8:49 AM  Performed by: ISIS ROSE  Authorized by: ISIS ROSE   Comparison: compared with previous ECG from 3/3/2017  Comparison to previous ECG: Inferior t wave inversions and ST changes consistent with ischemia    Rhythm: sinus rhythm  Rate: normal  BPM: 61  Conduction: conduction normal  ST Segments: ST segments normal  ST Elevation: aVL  T depression: III  QRS axis: normal  Other: no other findings  Clinical impression: abnormal ECG          Assessment:      Diagnosis Plan     ECG 12 Lead   1. Coronary artery disease  involving native coronary artery of native heart with unstable angina pectoris     2 Essential hypertension     3. Mixed hyperlipidemia       Plan:   His EKG is showing inferior ischemia and is different compared to his EKG done earlier in the month, he has chest discomfort at present, overall clinical picture is consistent with unstable angina.  I have recommended cardiac catheterization study for further evaluation, this will be followed by catheter-based intervention depending on findings.  This is to be scheduled for later today with Dr. Benavidez.  Further management will be based on findings of the procedure.  The procedure was explained to the patient/family extensively. Indications, benefits, risks and alternatives were discussed. The patient understands well, and wishes to proceed.   Thank you for allowing us to participate in the care of your patient.     Scribed for Candy Ribeiro MD by Meri Vu. 3/31/2017  9:05 AM     I, Candy Ribeiro MD, personally performed the services described in this documentation as scribed by the above named individual in my presence, and it is both accurate and complete.  3/31/2017  9:31 AM            Please note that portions of this note may have been completed with a voice recognition program. Efforts were made to edit the dictations, but occasionally words are mistranscribed.

## 2017-04-01 VITALS
WEIGHT: 180.12 LBS | HEART RATE: 51 BPM | BODY MASS INDEX: 26.68 KG/M2 | OXYGEN SATURATION: 96 % | SYSTOLIC BLOOD PRESSURE: 131 MMHG | HEIGHT: 69 IN | DIASTOLIC BLOOD PRESSURE: 89 MMHG | RESPIRATION RATE: 22 BRPM | TEMPERATURE: 98 F

## 2017-04-01 LAB
ANION GAP SERPL CALCULATED.3IONS-SCNC: 7 MMOL/L (ref 3–11)
ARTICHOKE IGE QN: 154 MG/DL (ref 0–130)
BUN BLD-MCNC: 17 MG/DL (ref 9–23)
BUN/CREAT SERPL: 18.9 (ref 7–25)
CALCIUM SPEC-SCNC: 9.8 MG/DL (ref 8.7–10.4)
CHLORIDE SERPL-SCNC: 104 MMOL/L (ref 99–109)
CHOLEST SERPL-MCNC: 236 MG/DL (ref 0–200)
CO2 SERPL-SCNC: 26 MMOL/L (ref 20–31)
CREAT BLD-MCNC: 0.9 MG/DL (ref 0.6–1.3)
DEPRECATED RDW RBC AUTO: 43.9 FL (ref 37–54)
ERYTHROCYTE [DISTWIDTH] IN BLOOD BY AUTOMATED COUNT: 13.1 % (ref 11.3–14.5)
GFR SERPL CREATININE-BSD FRML MDRD: 84 ML/MIN/1.73
GLUCOSE BLD-MCNC: 96 MG/DL (ref 70–100)
HCT VFR BLD AUTO: 39 % (ref 38.9–50.9)
HDLC SERPL-MCNC: 47 MG/DL (ref 40–60)
HGB BLD-MCNC: 13.5 G/DL (ref 13.1–17.5)
MCH RBC QN AUTO: 31.7 PG (ref 27–31)
MCHC RBC AUTO-ENTMCNC: 34.6 G/DL (ref 32–36)
MCV RBC AUTO: 91.5 FL (ref 80–99)
PLATELET # BLD AUTO: 148 10*3/MM3 (ref 150–450)
PMV BLD AUTO: 9.2 FL (ref 6–12)
POTASSIUM BLD-SCNC: 4.5 MMOL/L (ref 3.5–5.5)
RBC # BLD AUTO: 4.26 10*6/MM3 (ref 4.2–5.76)
SODIUM BLD-SCNC: 137 MMOL/L (ref 132–146)
TRIGL SERPL-MCNC: 237 MG/DL (ref 0–150)
WBC NRBC COR # BLD: 3.89 10*3/MM3 (ref 3.5–10.8)

## 2017-04-01 PROCEDURE — G0378 HOSPITAL OBSERVATION PER HR: HCPCS

## 2017-04-01 PROCEDURE — 99217 PR OBSERVATION CARE DISCHARGE MANAGEMENT: CPT | Performed by: PHYSICIAN ASSISTANT

## 2017-04-01 PROCEDURE — 25010000002 ENOXAPARIN PER 10 MG: Performed by: INTERNAL MEDICINE

## 2017-04-01 PROCEDURE — 85027 COMPLETE CBC AUTOMATED: CPT | Performed by: INTERNAL MEDICINE

## 2017-04-01 PROCEDURE — 80048 BASIC METABOLIC PNL TOTAL CA: CPT | Performed by: INTERNAL MEDICINE

## 2017-04-01 PROCEDURE — 80061 LIPID PANEL: CPT | Performed by: INTERNAL MEDICINE

## 2017-04-01 RX ORDER — ISOSORBIDE MONONITRATE 30 MG/1
30 TABLET, EXTENDED RELEASE ORAL
Qty: 30 TABLET | Refills: 11 | Status: SHIPPED | OUTPATIENT
Start: 2017-04-01 | End: 2017-11-17 | Stop reason: SDUPTHER

## 2017-04-01 RX ORDER — ISOSORBIDE MONONITRATE 30 MG/1
30 TABLET, EXTENDED RELEASE ORAL
Status: DISCONTINUED | OUTPATIENT
Start: 2017-04-01 | End: 2017-04-01 | Stop reason: HOSPADM

## 2017-04-01 RX ORDER — AMLODIPINE BESYLATE 5 MG/1
5 TABLET ORAL
Status: DISCONTINUED | OUTPATIENT
Start: 2017-04-01 | End: 2017-04-01 | Stop reason: HOSPADM

## 2017-04-01 RX ORDER — AMLODIPINE BESYLATE 5 MG/1
5 TABLET ORAL
Qty: 30 TABLET | Refills: 11 | Status: SHIPPED | OUTPATIENT
Start: 2017-04-01 | End: 2018-03-26 | Stop reason: SDUPTHER

## 2017-04-01 RX ORDER — CLOPIDOGREL BISULFATE 75 MG/1
75 TABLET ORAL DAILY
Qty: 30 TABLET | Refills: 11 | Status: SHIPPED | OUTPATIENT
Start: 2017-04-01 | End: 2018-03-26 | Stop reason: SDUPTHER

## 2017-04-01 RX ADMIN — LOSARTAN POTASSIUM 50 MG: 50 TABLET, FILM COATED ORAL at 09:12

## 2017-04-01 RX ADMIN — ASPIRIN 81 MG CHEWABLE TABLET 81 MG: 81 TABLET CHEWABLE at 09:10

## 2017-04-01 RX ADMIN — AMLODIPINE BESYLATE 5 MG: 5 TABLET ORAL at 13:27

## 2017-04-01 RX ADMIN — SERTRALINE HYDROCHLORIDE 100 MG: 100 TABLET, FILM COATED ORAL at 09:12

## 2017-04-01 RX ADMIN — ACYCLOVIR 400 MG: 200 CAPSULE ORAL at 09:12

## 2017-04-01 RX ADMIN — ISOSORBIDE MONONITRATE 30 MG: 30 TABLET, EXTENDED RELEASE ORAL at 13:27

## 2017-04-01 RX ADMIN — LEVOTHYROXINE SODIUM 150 MCG: 150 TABLET ORAL at 09:12

## 2017-04-01 RX ADMIN — METOPROLOL TARTRATE 12.5 MG: 25 TABLET, FILM COATED ORAL at 09:11

## 2017-04-01 RX ADMIN — ENOXAPARIN SODIUM 40 MG: 40 INJECTION SUBCUTANEOUS at 09:12

## 2017-04-01 RX ADMIN — CLOPIDOGREL BISULFATE 75 MG: 75 TABLET, FILM COATED ORAL at 09:14

## 2017-04-01 NOTE — DISCHARGE INSTRUCTIONS
PLEASE PURCHASE A BLOOD PRESSURE CUFF AND CHECK YOUR BLOOD PRESSURES (IN THE MORNING BEFORE TAKING MEDICATIONS) AND ALSO PER MD RECOMMENDATIONS.

## 2017-04-01 NOTE — DISCHARGE SUMMARY
[facility] Cardiology Services  DISCHARGE SUMMARY        Date of Admission: 3/31/2017  Date of Discharge:  2017    Patient Name: Enio Tapia  :1950    Discharge Diagnosis: Chest Pain; 2 stent intervention to LAD and proximal segment of 1st diagonal branch.    History of Present Illness:  Mr. Tapia is a 66 yr old white male with a history of CAD with a previous stent to the mid-RCA in  (LVEF 60%) seen by Dr. Candy Ribeiro in consultation (referred by PCP, Scot Mckay) for further evaluation of his coronary artery disease, hypertension and hyperlipidemia. Mr. Tapia had complaints of chest pressure and shortness of breath when walking briskly for the last several months. These symptoms have been similar to what the patient experienced prior to stent placement in , but not as severe. He also had noted mild chest discomfort at rest on a daily basis and occasional exertional sharp pains that radiated into his left elbow. Blood pressure has not  been well controlled the last several months therefore Losartan was increased from 25 mg to 50 mg about 3 weeks ago. Since that time, systolic blood pressures had decreased from 150's to 140's, but the diastolic has remained in the upper 80's to 90's. Patient had been intolerant to high dose Lipitor and Crestor in the past and has not been on a statin. Dr. Candy Ribeiro scheduled this patient for a cardiac catheterization     Hospital Course  Patient had left heart catheterization yesterday with the following results (see below in Procedures Performed). Patient is doing well post-procedure and has no further chest pain or shortness of breath. Patient is now in stable condition to go homw. Will defer beta blocker for now due to relative hypotension. Patient will be obtaining a blood pressure cuff after discharge for home use. Dr. Benavidez's nurse will be calling this patient next week to make make medication adjustments per Dr. Benavidez.    Also,  "the patient is intolerant to multiple statins.  It sounds like he may tolerate a low-dose of Lipitor but his primary care doctor is working with him on this issue.  They're exploring the possibility of him receiving PCS K-9 inhibitor.  This paperwork is apparently underway     Procedures Performed: 03/31/2017  Left Heart Catheterization: Dr. Enio Benavidez.  IMPRESSION:  · Severe CAD involving LAD and first diagonal branch. There was a 70% diffuse stenosis of the mid LAD. This is likely culprit for patient's clinical symptoms. Lesion is now s/p intervention with         a Xience Alpine 3.0 x 33 mm JOHANA with 0% residual stenosis.   · There was also a 70% discrete stenosis in the proximal segment of the first diagonal branch.         Lesion is now s/p intervention: Xience Alpine 2.25 x 15 mm JOHANA with 0% residual stenosis.  · Previously placed stent in proximal to mid RCA is widely patent.   · There is mild-moderate disease in RPL S and RPDA.   · Normal left ventricular ejection fraction with no regional wall abnormalities     Physical Exam on Discharge:    /96 (BP Location: Right arm, Patient Position: Lying)  Pulse 51  Temp 98 °F (36.7 °C) (Oral)   Resp 22  Ht 69\" (175.3 cm)  Wt 180 lb 1.9 oz (81.7 kg)  SpO2 96%  BMI 26.6 kg/m2    GEN: No apparent distress, Alert. Oriented x 3.  CV: regular rate and rhythm, no murmur.  RESP: clear to auscultation without wheezes or rales  EXT: no edema bilaterally    Discharge Disposition: Stable    Discharge Medications:   Enio Tapia   Home Medication Instructions [facility]:[id number]    Printed on:04/01/17 1240   Medication Information      acyclovir (ZOVIRAX) 400 MG tablet  Take 1 tablet by mouth Daily. Take no more than 5 doses a day.     amlodipine (NORVASC) 5 MG tablet  Take 1 tablet by mouth Daily.     aspirin 81 MG EC tablet  Take 81 mg by mouth Daily.     clopidogrel (PLAVIX) 75 MG tablet  Take 1 tablet by mouth Daily.     isosorbide mononitrate (IMDUR) 30 MG 24 " hr tablet  Take 1 tablet by mouth Daily.     levothyroxine 150 MCG tablet  Take 1 tablet by mouth daily.     losartan (COZAAR) 50 MG tablet  Take 1 tablet by mouth Daily.     sertraline (ZOLOFT) 100 MG tablet  Take 1 tablet by mouth Daily.     travoprost, PAUL free, (TRAVATAN) 0.004 % solution ophthalmic solution  1 drop every evening. in affected eye(s)       Discharge Diet: Cardiac    Activity at Discharge: Stable    Follow-up Appointments  Follow up with Dr. Candy Ribeiro in 1 month.    FRANCO Kaiser  04/01/17    I supervised care of this patient on the day of discharge with direct care provided by the advanced care provider (APC).    Enio Benavidez MD, MSc, [facility]  Interventional Cardiology  [facility]

## 2017-04-01 NOTE — PLAN OF CARE
Problem: Patient Care Overview (Adult)  Goal: Plan of Care Review  Outcome: Ongoing (interventions implemented as appropriate)    04/01/17 0431   Coping/Psychosocial Response Interventions   Plan Of Care Reviewed With patient   Patient Care Overview   Progress progress toward functional goals as expected   Outcome Evaluation   Outcome Summary/Follow up Plan TR band removed 2017. gauze and tegaderm over site. dressing clean dry and intact. no complaints of pain or discomfort. pt HR 54-61. metroprolol held. possible D/C today.

## 2017-04-01 NOTE — PLAN OF CARE
"Problem: Patient Care Overview (Adult)  Goal: Plan of Care Review  Outcome: Ongoing (interventions implemented as appropriate)    04/01/17 1420   Coping/Psychosocial Response Interventions   Plan Of Care Reviewed With patient;spouse   Patient Care Overview   Progress improving   Outcome Evaluation   Outcome Summary/Follow up Plan VSS - HR 49-60'S TODAY, DID INCREASE TO 70'S THIS AM. GAVE METOPROLOL. PATIENT HAS BEEN SR/SB WITH PVC'S. DR. DIAZ D/C'D METOPROLOL AND STARTED ON NORVASC & IMDUR. PLAN IS TO GO HOME TODAY IF BP IMPROVES. LEFT RADIAL SITE C/D/I. PATIENT HAD A FEW \"TWINGES\" OF CP - MD AWARE. NO OTHER COMPLAINTS.         /82 AT 1525 - HR 56.   "

## 2017-04-01 NOTE — PLAN OF CARE
Problem: Cardiac Catheterization with/without PCI (Adult)  Intervention: Monitor ECG and Peripheral Pulses    04/01/17 0431   Monitor ECG and Peripheral Pulses   Sinus Rhythm sinus bradycardia         Goal: Signs and Symptoms of Listed Potential Problems Will be Absent or Manageable (Cardiac Catheterization with/without PCI)  Outcome: Ongoing (interventions implemented as appropriate)    04/01/17 0431   Cardiac Catheterization with/without PCI   Problems Assessed (Cardiac Catheterization) situational response;access site complications   Problems Present (Cardiac Catheterization) none

## 2017-04-03 ENCOUNTER — DOCUMENTATION (OUTPATIENT)
Dept: CARDIAC REHAB | Facility: HOSPITAL | Age: 67
End: 2017-04-03

## 2017-04-03 NOTE — PROGRESS NOTES
Referral received for Phase II Cardiac Rehab.  Staff reviewed chart and patient has qualifying diagnosis for Phase II Cardiac Rehab.  Patient was discharged during non business hours. Staff will contact patient in regards to scheduling or referring to another facility.

## 2017-04-04 ENCOUNTER — DOCUMENTATION (OUTPATIENT)
Dept: CARDIAC REHAB | Facility: HOSPITAL | Age: 67
End: 2017-04-04

## 2017-04-04 NOTE — PROGRESS NOTES
Pt. Referred for Phase II Cardiac Rehab. Staff discussed benefits of exercise, program protocol, and educational material provided. Teach back verified.  Patient states that he cannot take off work to participate in the program. Staff discussed home exercise guidelines. Teach back verified. Thank you for this referral.

## 2017-04-11 ENCOUNTER — TELEPHONE (OUTPATIENT)
Dept: CARDIOLOGY | Facility: CLINIC | Age: 67
End: 2017-04-11

## 2017-05-12 ENCOUNTER — OFFICE VISIT (OUTPATIENT)
Dept: CARDIOLOGY | Facility: CLINIC | Age: 67
End: 2017-05-12

## 2017-05-12 VITALS
HEART RATE: 69 BPM | WEIGHT: 178.6 LBS | SYSTOLIC BLOOD PRESSURE: 130 MMHG | DIASTOLIC BLOOD PRESSURE: 72 MMHG | HEIGHT: 69 IN | BODY MASS INDEX: 26.45 KG/M2

## 2017-05-12 DIAGNOSIS — I25.10 CORONARY ARTERY DISEASE INVOLVING NATIVE CORONARY ARTERY OF NATIVE HEART WITHOUT ANGINA PECTORIS: Primary | ICD-10-CM

## 2017-05-12 DIAGNOSIS — E78.5 DYSLIPIDEMIA: ICD-10-CM

## 2017-05-12 DIAGNOSIS — I10 ESSENTIAL HYPERTENSION: ICD-10-CM

## 2017-05-12 PROCEDURE — 99213 OFFICE O/P EST LOW 20 MIN: CPT | Performed by: INTERNAL MEDICINE

## 2017-05-12 RX ORDER — CARVEDILOL 3.12 MG/1
3.12 TABLET ORAL 2 TIMES DAILY WITH MEALS
Qty: 60 TABLET | Refills: 6 | Status: SHIPPED | OUTPATIENT
Start: 2017-05-12 | End: 2017-12-13 | Stop reason: SDUPTHER

## 2017-05-12 RX ORDER — ATORVASTATIN CALCIUM 40 MG/1
40 TABLET, FILM COATED ORAL DAILY
Qty: 30 TABLET | Refills: 6 | Status: SHIPPED | OUTPATIENT
Start: 2017-05-12 | End: 2017-11-17 | Stop reason: ALTCHOICE

## 2017-06-22 RX ORDER — LEVOTHYROXINE SODIUM 0.15 MG/1
TABLET ORAL
Qty: 30 TABLET | Refills: 2 | Status: SHIPPED | OUTPATIENT
Start: 2017-06-22 | End: 2017-09-17 | Stop reason: SDUPTHER

## 2017-09-06 RX ORDER — LOSARTAN POTASSIUM 50 MG/1
TABLET ORAL
Qty: 30 TABLET | Refills: 0 | Status: SHIPPED | OUTPATIENT
Start: 2017-09-06 | End: 2017-10-15 | Stop reason: SDUPTHER

## 2017-09-18 RX ORDER — LEVOTHYROXINE SODIUM 0.15 MG/1
TABLET ORAL
Qty: 30 TABLET | Refills: 0 | Status: SHIPPED | OUTPATIENT
Start: 2017-09-18 | End: 2017-10-15 | Stop reason: SDUPTHER

## 2017-09-27 ENCOUNTER — TELEPHONE (OUTPATIENT)
Dept: FAMILY MEDICINE CLINIC | Facility: CLINIC | Age: 67
End: 2017-09-27

## 2017-10-16 RX ORDER — ACYCLOVIR 400 MG/1
TABLET ORAL
Qty: 30 TABLET | Refills: 4 | Status: SHIPPED | OUTPATIENT
Start: 2017-10-16 | End: 2018-04-02 | Stop reason: SDUPTHER

## 2017-10-16 RX ORDER — LOSARTAN POTASSIUM 50 MG/1
TABLET ORAL
Qty: 30 TABLET | Refills: 0 | Status: SHIPPED | OUTPATIENT
Start: 2017-10-16 | End: 2017-11-14 | Stop reason: SDUPTHER

## 2017-10-16 RX ORDER — LEVOTHYROXINE SODIUM 0.15 MG/1
TABLET ORAL
Qty: 30 TABLET | Refills: 0 | Status: SHIPPED | OUTPATIENT
Start: 2017-10-16 | End: 2017-11-14 | Stop reason: SDUPTHER

## 2017-10-18 DIAGNOSIS — Z79.899 HIGH RISK MEDICATION USE: ICD-10-CM

## 2017-10-18 DIAGNOSIS — E78.5 HYPERLIPIDEMIA, UNSPECIFIED HYPERLIPIDEMIA TYPE: Primary | ICD-10-CM

## 2017-10-27 ENCOUNTER — LAB (OUTPATIENT)
Dept: LAB | Facility: HOSPITAL | Age: 67
End: 2017-10-27

## 2017-10-27 DIAGNOSIS — Z79.899 HIGH RISK MEDICATION USE: ICD-10-CM

## 2017-10-27 DIAGNOSIS — E78.5 HYPERLIPIDEMIA, UNSPECIFIED HYPERLIPIDEMIA TYPE: ICD-10-CM

## 2017-10-27 LAB
ALBUMIN SERPL-MCNC: 4.2 G/DL (ref 3.2–4.8)
ALP SERPL-CCNC: 62 U/L (ref 25–100)
ALT SERPL W P-5'-P-CCNC: 34 U/L (ref 7–40)
ARTICHOKE IGE QN: 147 MG/DL (ref 0–130)
AST SERPL-CCNC: 24 U/L (ref 0–33)
BILIRUB CONJ SERPL-MCNC: 0.1 MG/DL (ref 0–0.2)
BILIRUB INDIRECT SERPL-MCNC: 0.5 MG/DL (ref 0.1–1.1)
BILIRUB SERPL-MCNC: 0.6 MG/DL (ref 0.3–1.2)
CHOLEST SERPL-MCNC: 208 MG/DL (ref 0–200)
HDLC SERPL-MCNC: 63 MG/DL (ref 40–60)
PROT SERPL-MCNC: 6.8 G/DL (ref 5.7–8.2)
TRIGL SERPL-MCNC: 96 MG/DL (ref 0–150)

## 2017-10-27 PROCEDURE — 80076 HEPATIC FUNCTION PANEL: CPT | Performed by: INTERNAL MEDICINE

## 2017-10-27 PROCEDURE — 80061 LIPID PANEL: CPT | Performed by: INTERNAL MEDICINE

## 2017-10-27 PROCEDURE — 36415 COLL VENOUS BLD VENIPUNCTURE: CPT

## 2017-11-14 ENCOUNTER — OFFICE VISIT (OUTPATIENT)
Dept: FAMILY MEDICINE CLINIC | Facility: CLINIC | Age: 67
End: 2017-11-14

## 2017-11-14 VITALS
DIASTOLIC BLOOD PRESSURE: 80 MMHG | OXYGEN SATURATION: 98 % | BODY MASS INDEX: 25.95 KG/M2 | RESPIRATION RATE: 16 BRPM | HEIGHT: 69 IN | SYSTOLIC BLOOD PRESSURE: 130 MMHG | WEIGHT: 175.2 LBS | HEART RATE: 73 BPM

## 2017-11-14 DIAGNOSIS — I10 ESSENTIAL HYPERTENSION: Primary | ICD-10-CM

## 2017-11-14 DIAGNOSIS — E03.9 ACQUIRED HYPOTHYROIDISM: ICD-10-CM

## 2017-11-14 PROCEDURE — 99214 OFFICE O/P EST MOD 30 MIN: CPT | Performed by: FAMILY MEDICINE

## 2017-11-14 PROCEDURE — 90662 IIV NO PRSV INCREASED AG IM: CPT | Performed by: FAMILY MEDICINE

## 2017-11-14 PROCEDURE — 90471 IMMUNIZATION ADMIN: CPT | Performed by: FAMILY MEDICINE

## 2017-11-14 RX ORDER — LOSARTAN POTASSIUM 50 MG/1
50 TABLET ORAL DAILY
Qty: 90 TABLET | Refills: 2 | Status: SHIPPED | OUTPATIENT
Start: 2017-11-14 | End: 2018-10-19 | Stop reason: SDUPTHER

## 2017-11-14 RX ORDER — LEVOTHYROXINE SODIUM 0.15 MG/1
150 TABLET ORAL DAILY
Qty: 90 TABLET | Refills: 2 | Status: SHIPPED | OUTPATIENT
Start: 2017-11-14 | End: 2018-10-19 | Stop reason: SDUPTHER

## 2017-11-14 RX ORDER — SERTRALINE HYDROCHLORIDE 100 MG/1
100 TABLET, FILM COATED ORAL DAILY
COMMUNITY
Start: 2017-10-15 | End: 2018-05-25 | Stop reason: SDUPTHER

## 2017-11-15 RX ORDER — SERTRALINE HYDROCHLORIDE 100 MG/1
TABLET, FILM COATED ORAL
Qty: 30 TABLET | Refills: 4 | OUTPATIENT
Start: 2017-11-15

## 2017-11-15 NOTE — PROGRESS NOTES
Subjective   Enio Tapia is a 67 y.o. male.     Hypertension   This is a chronic problem. The current episode started more than 1 year ago. The problem is unchanged. The problem is controlled. Associated symptoms include chest pain. Pertinent negatives include no headaches, palpitations or shortness of breath. Risk factors for coronary artery disease include family history and male gender. Past treatments include beta blockers. The current treatment provides significant improvement. There are no compliance problems.  Hypertensive end-organ damage includes CAD/MI. There is no history of angina or kidney disease.   Hypothyroidism   This is a chronic problem. The problem occurs constantly. The problem has been unchanged. Associated symptoms include arthralgias, chest pain and myalgias. Pertinent negatives include no congestion, coughing, fatigue, headaches, nausea, rash or weakness. The treatment provided significant relief.   Hyperlipidemia   This is a chronic (Last laboratory data showed an elevated triglycerides a she may be willing to try a fiberate or zetia) problem. The current episode started more than 1 year ago. The problem is uncontrolled. Exacerbating diseases include hypothyroidism. Factors aggravating his hyperlipidemia include fatty foods. Associated symptoms include chest pain and myalgias. Pertinent negatives include no shortness of breath. Current antihyperlipidemic treatment includes diet change (Unable to tolerate statins gave it another try was not able to tolerate). Compliance problems include medication side effects.  Risk factors for coronary artery disease include male sex and dyslipidemia.        The following portions of the patient's history were reviewed and updated as appropriate: allergies, current medications, past social history and problem list.    Review of Systems   Constitutional: Negative for fatigue and unexpected weight change.   HENT: Negative for congestion and sinus  "pressure.    Respiratory: Negative for cough, chest tightness and shortness of breath.    Cardiovascular: Positive for chest pain. Negative for palpitations and leg swelling.   Gastrointestinal: Negative for nausea.   Endocrine: Negative for cold intolerance and heat intolerance.   Genitourinary: Negative for dysuria and hematuria.   Musculoskeletal: Positive for arthralgias and myalgias.   Skin: Negative for color change and rash.   Neurological: Negative for dizziness, syncope, weakness and headaches.       Objective   /80  Pulse 73  Resp 16  Ht 69\" (175.3 cm)  Wt 175 lb 3.2 oz (79.5 kg)  SpO2 98%  BMI 25.87 kg/m2  Physical Exam   Constitutional: He is oriented to person, place, and time. He appears well-developed and well-nourished. He is cooperative.   HENT:   Head: Normocephalic.   Right Ear: External ear normal.   Left Ear: External ear normal.   Nose: Nose normal.   Mouth/Throat: Oropharynx is clear and moist.   Eyes: Conjunctivae are normal. Pupils are equal, round, and reactive to light. No scleral icterus.   Neck: Neck supple. Carotid bruit is not present. No thyromegaly present.   Cardiovascular: Normal rate, regular rhythm and normal heart sounds.    Pulmonary/Chest: Effort normal and breath sounds normal.   Abdominal: There is no hepatosplenomegaly.   Musculoskeletal: Normal range of motion. He exhibits no edema.   Lymphadenopathy:     He has no cervical adenopathy.   Neurological: He is alert and oriented to person, place, and time.   No focal deficits no lateralizing signs   Skin: Skin is warm and dry. No rash noted.   Psychiatric: He has a normal mood and affect. Cognition and memory are normal.   Nursing note and vitals reviewed.      Assessment/Plan   Problem List Items Addressed This Visit     Hypertension - Primary    Relevant Medications    losartan (COZAAR) 50 MG tablet    Hypothyroidism    Relevant Medications    levothyroxine (SYNTHROID, LEVOTHROID) 150 MCG tablet          New " Medications Ordered This Visit   Medications   • levothyroxine (SYNTHROID, LEVOTHROID) 150 MCG tablet     Sig: Take 1 tablet by mouth Daily.     Dispense:  90 tablet     Refill:  2   • losartan (COZAAR) 50 MG tablet     Sig: Take 1 tablet by mouth Daily.     Dispense:  90 tablet     Refill:  2   • sertraline (ZOLOFT) 100 MG tablet

## 2017-11-17 ENCOUNTER — OFFICE VISIT (OUTPATIENT)
Dept: CARDIOLOGY | Facility: CLINIC | Age: 67
End: 2017-11-17

## 2017-11-17 VITALS
BODY MASS INDEX: 26.07 KG/M2 | WEIGHT: 176 LBS | HEIGHT: 69 IN | DIASTOLIC BLOOD PRESSURE: 88 MMHG | HEART RATE: 68 BPM | SYSTOLIC BLOOD PRESSURE: 130 MMHG

## 2017-11-17 DIAGNOSIS — E78.2 MIXED HYPERLIPIDEMIA: ICD-10-CM

## 2017-11-17 DIAGNOSIS — I25.118 CORONARY ARTERY DISEASE OF NATIVE ARTERY OF NATIVE HEART WITH STABLE ANGINA PECTORIS (HCC): Primary | ICD-10-CM

## 2017-11-17 DIAGNOSIS — I10 ESSENTIAL HYPERTENSION: ICD-10-CM

## 2017-11-17 PROCEDURE — 99214 OFFICE O/P EST MOD 30 MIN: CPT | Performed by: INTERNAL MEDICINE

## 2017-11-17 PROCEDURE — 93000 ELECTROCARDIOGRAM COMPLETE: CPT | Performed by: INTERNAL MEDICINE

## 2017-11-17 RX ORDER — PRAVASTATIN SODIUM 40 MG
40 TABLET ORAL DAILY
Qty: 30 TABLET | Refills: 11 | Status: SHIPPED | OUTPATIENT
Start: 2017-11-17 | End: 2018-05-25

## 2017-11-17 RX ORDER — ISOSORBIDE MONONITRATE 60 MG/1
60 TABLET, EXTENDED RELEASE ORAL
Qty: 30 TABLET | Refills: 11 | Status: SHIPPED | OUTPATIENT
Start: 2017-11-17 | End: 2019-05-31

## 2017-11-17 RX ORDER — SERTRALINE HYDROCHLORIDE 100 MG/1
TABLET, FILM COATED ORAL
Qty: 30 TABLET | Refills: 4 | Status: SHIPPED | OUTPATIENT
Start: 2017-11-17 | End: 2018-04-24 | Stop reason: SDUPTHER

## 2017-11-17 NOTE — PROGRESS NOTES
Encounter Date:11/17/2017      Patient ID: Enio Tapia is a 67 y.o. male.    Chief Complaint: Coronary Artery Disease      PROBLEM LIST:  1. Coronary artery disease  a. As/P3 0.5 x 32 Taxus JOHANA mid RCA lesion 10/15/04  b. Normal stress echo Feb 2010  c. Echo February 2010 showed normal EF of 60% with trace TR and MR. mary. C 3/31/17For unstable angina: Diffuse 70% stenosis of mid LAD noted.  Stented with Xience 3.0 x 33 JOHANA reducing stenosis to 0%.  Also 70% discrete stenosis in proximal first diagonal stented with Xience Alpine 2.25 x 15 JOHANA reducing stenosis to 0%.  Previous stenting proximal to mid RCA widely patent.  Normal LVEF.  e. Echo 3/31/17: EF 55%.  Mild LVH noted.  2. Hypertension  3. Hyperlipidemia  4. Hypothyroidism  5. Anxiety disorder  6. Glaucoma  7. Osteoarthritis  8. GRABIEL with CPAP compliance.    History of Present Illness  Patient presents today for follow-up with a history of CAD and cardiac risk factors. He maintains very active and busy working on the heart's form.  States that sometimes when he engages in certain specific activities he experiences a chest tightness and pressure however this is random and not always predictable.  On most occasions he is able to remain busy and active without limitations.  He had severe myalgias and had to discontinue Lipitor.  Recent lipid panel showed persistent elevation.  Wants to know if he can try something different.  No shortness of breath palpitations dizziness or syncope.    Allergies   Allergen Reactions   • Statins Myalgia         Current Outpatient Prescriptions:   •  acyclovir (ZOVIRAX) 400 MG tablet, TAKE ONE TABLET BY MOUTH DAILY *TAKE NO MORE THAN 5 DOSES A DAY, Disp: 30 tablet, Rfl: 4  •  amLODIPine (NORVASC) 5 MG tablet, Take 1 tablet by mouth Daily., Disp: 30 tablet, Rfl: 11  •  aspirin 81 MG EC tablet, Take 81 mg by mouth Daily., Disp: , Rfl:   •  carvedilol (COREG) 3.125 MG tablet, Take 1 tablet by mouth 2 (Two) Times a Day  "With Meals., Disp: 60 tablet, Rfl: 6  •  clopidogrel (PLAVIX) 75 MG tablet, Take 1 tablet by mouth Daily., Disp: 30 tablet, Rfl: 11  •  isosorbide mononitrate (IMDUR) 60 MG 24 hr tablet, Take 1 tablet by mouth Daily., Disp: 30 tablet, Rfl: 11  •  levothyroxine (SYNTHROID, LEVOTHROID) 150 MCG tablet, Take 1 tablet by mouth Daily., Disp: 90 tablet, Rfl: 2  •  losartan (COZAAR) 50 MG tablet, Take 1 tablet by mouth Daily., Disp: 90 tablet, Rfl: 2  •  sertraline (ZOLOFT) 100 MG tablet, 100 mg Daily., Disp: , Rfl:   •  travoprost, PAUL free, (TRAVATAN) 0.004 % solution ophthalmic solution, 1 drop every evening. in affected eye(s), Disp: , Rfl:   •  pravastatin (PRAVACHOL) 40 MG tablet, Take 1 tablet by mouth Daily., Disp: 30 tablet, Rfl: 11    The following portions of the patient's history were reviewed and updated as appropriate: allergies, current medications, past family history, past medical history, past social history, past surgical history and problem list.    ROS  Review of Systems   Constitution: Negative for chills, fever, weight gain and weight loss.   Cardiovascular: Negative for chest pain, claudication, dyspnea on exertion, leg swelling, orthopnea, palpitations, paroxysmal nocturnal dyspnea and syncope.        No dizziness   Gastrointestinal: Negative for abdominal pain, constipation, diarrhea, nausea and vomiting.   Genitourinary:        No urinary symptoms   Neurological:        No symptoms of stroke.   All other systems reviewed and are negative.    Objective:     Blood pressure 130/88, pulse 68, height 69\" (175.3 cm), weight 176 lb (79.8 kg).        Physical Exam  Constitutional: She appears well-developed and well-nourished.   HENT:   HEENT exam unremarkable.   Neck: Neck supple. No JVD present.   No carotid bruits.   Cardiovascular: Normal rate, regular rhythm and normal heart sounds.    No murmur heard.  2 plus symmetric pulses.   Pulmonary/Chest: Breath sounds normal. Does not exhibit tenderness. "   Abdominal:   Abdomen benign.   Musculoskeletal: Does not exhibit edema.   Neurological:   Neurological exam unremarkable.   Vitals reviewed.    Lab Review:     ECG 12 Lead  Date/Time: 11/17/2017 2:57 PM  Performed by: ISIS RIBEIRO  Authorized by: ISIS RIBEIRO   Comparison: compared with previous ECG from 3/31/2017  Similar to previous ECG  Rhythm: sinus rhythm  Clinical impression: non-specific ECG  Comments: Occasional APCs.               Assessment:      Diagnosis Plan   1. Coronary artery disease of native artery of native heart with stable angina pectoris Occasional atypical chest pain appears consistent with spastic angina.      2. Essential hypertension, well controlled.      3. Mixed hyperlipidemia, Intolerant to Lipitor.        Plan:   Increase Imdur to 60 mg daily.  Start pravastatin 40 mg daily, will recheck FLP and CMP at next visit.  He was also advised to use CoQ10 and vitamin D supplements.  Continue rest of the current medications.   FU in 6 MO, sooner as needed.  Thank you for allowing us to participate in the care of your patient.     Scribed for Isis Ribeiro MD by Juancho Mcmanus. 11/17/2017  2:58 PM    IIsis MD, personally performed the services described in this documentation as scribed by the above named individual in my presence, and it is both accurate and complete.  11/17/2017  2:59 PM        Please note that portions of this note may have been completed with a voice recognition program. Efforts were made to edit the dictations, but occasionally words are mistranscribed.

## 2017-12-13 RX ORDER — CARVEDILOL 3.12 MG/1
TABLET ORAL
Qty: 60 TABLET | Refills: 5 | Status: SHIPPED | OUTPATIENT
Start: 2017-12-13 | End: 2018-10-22 | Stop reason: SDUPTHER

## 2018-03-26 RX ORDER — AMLODIPINE BESYLATE 5 MG/1
5 TABLET ORAL
Qty: 30 TABLET | Refills: 11 | Status: SHIPPED | OUTPATIENT
Start: 2018-03-26 | End: 2019-01-25 | Stop reason: SDUPTHER

## 2018-03-26 RX ORDER — CLOPIDOGREL BISULFATE 75 MG/1
75 TABLET ORAL DAILY
Qty: 30 TABLET | Refills: 11 | Status: ON HOLD | OUTPATIENT
Start: 2018-03-26 | End: 2018-06-09

## 2018-04-02 RX ORDER — ACYCLOVIR 400 MG/1
TABLET ORAL
Qty: 30 TABLET | Refills: 3 | Status: SHIPPED | OUTPATIENT
Start: 2018-04-02 | End: 2018-07-28 | Stop reason: SDUPTHER

## 2018-04-24 RX ORDER — SERTRALINE HYDROCHLORIDE 100 MG/1
TABLET, FILM COATED ORAL
Qty: 30 TABLET | Refills: 0 | Status: SHIPPED | OUTPATIENT
Start: 2018-04-24 | End: 2018-05-28 | Stop reason: SDUPTHER

## 2018-05-19 ENCOUNTER — APPOINTMENT (OUTPATIENT)
Dept: CT IMAGING | Facility: HOSPITAL | Age: 68
End: 2018-05-19

## 2018-05-19 ENCOUNTER — HOSPITAL ENCOUNTER (INPATIENT)
Facility: HOSPITAL | Age: 68
LOS: 3 days | Discharge: HOME | End: 2018-05-22
Attending: EMERGENCY MEDICINE | Admitting: THORACIC SURGERY (CARDIOTHORACIC VASCULAR SURGERY)

## 2018-05-19 DIAGNOSIS — S20.211A HEMATOMA OF RIGHT CHEST WALL, INITIAL ENCOUNTER: Primary | ICD-10-CM

## 2018-05-19 DIAGNOSIS — S10.93XA HEMATOMA OF NECK, INITIAL ENCOUNTER: ICD-10-CM
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
## 2018-05-19 LAB
ANION GAP SERPL CALCULATED.3IONS-SCNC: 6 MMOL/L (ref 3–11)
BASOPHILS # BLD AUTO: 0.02 10*3/MM3 (ref 0–0.2)
BASOPHILS NFR BLD AUTO: 0.3 % (ref 0–1)
BUN BLD-MCNC: 19 MG/DL (ref 9–23)
BUN/CREAT SERPL: 17.3 (ref 7–25)
CALCIUM SPEC-SCNC: 9 MG/DL (ref 8.7–10.4)
CHLORIDE SERPL-SCNC: 109 MMOL/L (ref 99–109)
CO2 SERPL-SCNC: 25 MMOL/L (ref 20–31)
CREAT BLD-MCNC: 1.1 MG/DL (ref 0.6–1.3)
DEPRECATED RDW RBC AUTO: 45.2 FL (ref 37–54)
EOSINOPHIL # BLD AUTO: 0.13 10*3/MM3 (ref 0–0.3)
EOSINOPHIL NFR BLD AUTO: 2.2 % (ref 0–3)
ERYTHROCYTE [DISTWIDTH] IN BLOOD BY AUTOMATED COUNT: 13.3 % (ref 11.3–14.5)
GFR SERPL CREATININE-BSD FRML MDRD: 66 ML/MIN/1.73
GLUCOSE BLD-MCNC: 129 MG/DL (ref 70–100)
HCT VFR BLD AUTO: 30.4 % (ref 38.9–50.9)
HCT VFR BLD AUTO: 37.2 % (ref 38.9–50.9)
HGB BLD-MCNC: 10.5 G/DL (ref 13.1–17.5)
HGB BLD-MCNC: 13 G/DL (ref 13.1–17.5)
IMM GRANULOCYTES # BLD: 0.01 10*3/MM3 (ref 0–0.03)
IMM GRANULOCYTES NFR BLD: 0.2 % (ref 0–0.6)
INR PPP: 1.12 (ref 0.91–1.09)
LYMPHOCYTES # BLD AUTO: 1.23 10*3/MM3 (ref 0.6–4.8)
LYMPHOCYTES NFR BLD AUTO: 21.2 % (ref 24–44)
MCH RBC QN AUTO: 32.3 PG (ref 27–31)
MCHC RBC AUTO-ENTMCNC: 34.9 G/DL (ref 32–36)
MCV RBC AUTO: 92.5 FL (ref 80–99)
MONOCYTES # BLD AUTO: 0.33 10*3/MM3 (ref 0–1)
MONOCYTES NFR BLD AUTO: 5.7 % (ref 0–12)
NEUTROPHILS # BLD AUTO: 4.07 10*3/MM3 (ref 1.5–8.3)
NEUTROPHILS NFR BLD AUTO: 70.4 % (ref 41–71)
PA ADP PRP-ACNC: 205 PRU
PLATELET # BLD AUTO: 180 10*3/MM3 (ref 150–450)
PMV BLD AUTO: 9 FL (ref 6–12)
POTASSIUM BLD-SCNC: 4.3 MMOL/L (ref 3.5–5.5)
PROTHROMBIN TIME: 11.8 SECONDS (ref 9.6–11.5)
RBC # BLD AUTO: 4.02 10*6/MM3 (ref 4.2–5.76)
SODIUM BLD-SCNC: 140 MMOL/L (ref 132–146)
WBC NRBC COR # BLD: 5.79 10*3/MM3 (ref 3.5–10.8)

## 2018-05-19 PROCEDURE — 25010000002 PIPERACILLIN SOD-TAZOBACTAM PER 1 G: Performed by: PHYSICIAN ASSISTANT

## 2018-05-19 PROCEDURE — 71260 CT THORAX DX C+: CPT

## 2018-05-19 PROCEDURE — 25010000002 ONDANSETRON PER 1 MG: Performed by: PHYSICIAN ASSISTANT

## 2018-05-19 PROCEDURE — 99284 EMERGENCY DEPT VISIT MOD MDM: CPT

## 2018-05-19 PROCEDURE — 80047 BASIC METABLC PNL IONIZED CA: CPT

## 2018-05-19 PROCEDURE — 25010000002 TDAP 5-2.5-18.5 LF-MCG/0.5 SUSPENSION: Performed by: EMERGENCY MEDICINE

## 2018-05-19 PROCEDURE — 85014 HEMATOCRIT: CPT | Performed by: THORACIC SURGERY (CARDIOTHORACIC VASCULAR SURGERY)

## 2018-05-19 PROCEDURE — 25010000002 MORPHINE SULFATE (PF) 2 MG/ML SOLUTION: Performed by: THORACIC SURGERY (CARDIOTHORACIC VASCULAR SURGERY)

## 2018-05-19 PROCEDURE — 90471 IMMUNIZATION ADMIN: CPT | Performed by: EMERGENCY MEDICINE

## 2018-05-19 PROCEDURE — G0378 HOSPITAL OBSERVATION PER HR: HCPCS

## 2018-05-19 PROCEDURE — 85014 HEMATOCRIT: CPT

## 2018-05-19 PROCEDURE — 90715 TDAP VACCINE 7 YRS/> IM: CPT | Performed by: EMERGENCY MEDICINE

## 2018-05-19 PROCEDURE — 85576 BLOOD PLATELET AGGREGATION: CPT | Performed by: EMERGENCY MEDICINE

## 2018-05-19 PROCEDURE — 85025 COMPLETE CBC W/AUTO DIFF WBC: CPT | Performed by: PHYSICIAN ASSISTANT

## 2018-05-19 PROCEDURE — 93010 ELECTROCARDIOGRAM REPORT: CPT | Performed by: INTERNAL MEDICINE

## 2018-05-19 PROCEDURE — 80048 BASIC METABOLIC PNL TOTAL CA: CPT | Performed by: EMERGENCY MEDICINE

## 2018-05-19 PROCEDURE — 85018 HEMOGLOBIN: CPT | Performed by: THORACIC SURGERY (CARDIOTHORACIC VASCULAR SURGERY)

## 2018-05-19 PROCEDURE — 85610 PROTHROMBIN TIME: CPT | Performed by: THORACIC SURGERY (CARDIOTHORACIC VASCULAR SURGERY)

## 2018-05-19 PROCEDURE — 94799 UNLISTED PULMONARY SVC/PX: CPT

## 2018-05-19 PROCEDURE — 25010000003 CEFAZOLIN 1-4 GM/50ML-% SOLUTION: Performed by: EMERGENCY MEDICINE

## 2018-05-19 PROCEDURE — 93005 ELECTROCARDIOGRAM TRACING: CPT | Performed by: THORACIC SURGERY (CARDIOTHORACIC VASCULAR SURGERY)

## 2018-05-19 PROCEDURE — 99219 PR INITIAL OBSERVATION CARE/DAY 50 MINUTES: CPT | Performed by: THORACIC SURGERY (CARDIOTHORACIC VASCULAR SURGERY)

## 2018-05-19 PROCEDURE — 85025 COMPLETE CBC W/AUTO DIFF WBC: CPT | Performed by: EMERGENCY MEDICINE

## 2018-05-19 PROCEDURE — 25010000002 IOPAMIDOL 61 % SOLUTION: Performed by: EMERGENCY MEDICINE

## 2018-05-19 RX ORDER — LEVOTHYROXINE SODIUM 0.15 MG/1
150 TABLET ORAL DAILY
Status: DISCONTINUED | OUTPATIENT
Start: 2018-05-19 | End: 2018-05-22 | Stop reason: HOSPADM

## 2018-05-19 RX ORDER — NALOXONE HCL 0.4 MG/ML
0.4 VIAL (ML) INJECTION
Status: DISCONTINUED | OUTPATIENT
Start: 2018-05-19 | End: 2018-05-22 | Stop reason: HOSPADM

## 2018-05-19 RX ORDER — DOXYCYCLINE 100 MG/1
100 CAPSULE ORAL ONCE
Status: COMPLETED | OUTPATIENT
Start: 2018-05-19 | End: 2018-05-19

## 2018-05-19 RX ORDER — ISOSORBIDE MONONITRATE 60 MG/1
60 TABLET, EXTENDED RELEASE ORAL DAILY
Status: DISCONTINUED | OUTPATIENT
Start: 2018-05-19 | End: 2018-05-19

## 2018-05-19 RX ORDER — CEFAZOLIN SODIUM 1 G/50ML
1 INJECTION, SOLUTION INTRAVENOUS EVERY 8 HOURS
Status: DISCONTINUED | OUTPATIENT
Start: 2018-05-19 | End: 2018-05-19

## 2018-05-19 RX ORDER — ONDANSETRON 2 MG/ML
4 INJECTION INTRAMUSCULAR; INTRAVENOUS EVERY 6 HOURS PRN
Status: DISCONTINUED | OUTPATIENT
Start: 2018-05-19 | End: 2018-05-22 | Stop reason: HOSPADM

## 2018-05-19 RX ORDER — ISOSORBIDE MONONITRATE 60 MG/1
60 TABLET, EXTENDED RELEASE ORAL
Status: DISCONTINUED | OUTPATIENT
Start: 2018-05-20 | End: 2018-05-22 | Stop reason: HOSPADM

## 2018-05-19 RX ORDER — AMLODIPINE BESYLATE 5 MG/1
5 TABLET ORAL DAILY
Status: DISCONTINUED | OUTPATIENT
Start: 2018-05-20 | End: 2018-05-22 | Stop reason: HOSPADM

## 2018-05-19 RX ORDER — CARVEDILOL 3.12 MG/1
3.12 TABLET ORAL DAILY
Status: DISCONTINUED | OUTPATIENT
Start: 2018-05-20 | End: 2018-05-22 | Stop reason: HOSPADM

## 2018-05-19 RX ORDER — ACETAMINOPHEN 325 MG/1
650 TABLET ORAL EVERY 4 HOURS PRN
Status: DISCONTINUED | OUTPATIENT
Start: 2018-05-19 | End: 2018-05-22 | Stop reason: HOSPADM

## 2018-05-19 RX ORDER — OXYCODONE AND ACETAMINOPHEN 10; 325 MG/1; MG/1
1 TABLET ORAL EVERY 4 HOURS PRN
Status: DISCONTINUED | OUTPATIENT
Start: 2018-05-19 | End: 2018-05-22 | Stop reason: HOSPADM

## 2018-05-19 RX ORDER — CEFAZOLIN SODIUM 1 G/50ML
1 INJECTION, SOLUTION INTRAVENOUS ONCE
Status: COMPLETED | OUTPATIENT
Start: 2018-05-19 | End: 2018-05-19

## 2018-05-19 RX ORDER — SODIUM CHLORIDE 0.9 % (FLUSH) 0.9 %
1-10 SYRINGE (ML) INJECTION AS NEEDED
Status: DISCONTINUED | OUTPATIENT
Start: 2018-05-19 | End: 2018-05-22 | Stop reason: HOSPADM

## 2018-05-19 RX ORDER — ACYCLOVIR 200 MG/1
400 CAPSULE ORAL DAILY
Status: DISCONTINUED | OUTPATIENT
Start: 2018-05-20 | End: 2018-05-22 | Stop reason: HOSPADM

## 2018-05-19 RX ORDER — ASPIRIN 81 MG/1
81 TABLET ORAL DAILY
Status: DISCONTINUED | OUTPATIENT
Start: 2018-05-19 | End: 2018-05-22 | Stop reason: HOSPADM

## 2018-05-19 RX ORDER — ATORVASTATIN CALCIUM 10 MG/1
10 TABLET, FILM COATED ORAL DAILY
Status: DISCONTINUED | OUTPATIENT
Start: 2018-05-19 | End: 2018-05-22 | Stop reason: HOSPADM

## 2018-05-19 RX ORDER — LATANOPROST 50 UG/ML
1 SOLUTION/ DROPS OPHTHALMIC NIGHTLY
Status: DISCONTINUED | OUTPATIENT
Start: 2018-05-19 | End: 2018-05-22 | Stop reason: HOSPADM

## 2018-05-19 RX ORDER — HYDROCODONE BITARTRATE AND ACETAMINOPHEN 7.5; 325 MG/1; MG/1
1 TABLET ORAL EVERY 4 HOURS PRN
Status: DISCONTINUED | OUTPATIENT
Start: 2018-05-19 | End: 2018-05-22 | Stop reason: HOSPADM

## 2018-05-19 RX ORDER — LOSARTAN POTASSIUM 50 MG/1
50 TABLET ORAL DAILY
Status: DISCONTINUED | OUTPATIENT
Start: 2018-05-19 | End: 2018-05-22 | Stop reason: HOSPADM

## 2018-05-19 RX ORDER — SERTRALINE HYDROCHLORIDE 100 MG/1
100 TABLET, FILM COATED ORAL DAILY
Status: DISCONTINUED | OUTPATIENT
Start: 2018-05-19 | End: 2018-05-22 | Stop reason: HOSPADM

## 2018-05-19 RX ORDER — MORPHINE SULFATE 2 MG/ML
4 INJECTION, SOLUTION INTRAMUSCULAR; INTRAVENOUS EVERY 4 HOURS PRN
Status: DISCONTINUED | OUTPATIENT
Start: 2018-05-19 | End: 2018-05-22 | Stop reason: HOSPADM

## 2018-05-19 RX ORDER — DIPHENHYDRAMINE HCL 25 MG
25 CAPSULE ORAL NIGHTLY PRN
Status: DISCONTINUED | OUTPATIENT
Start: 2018-05-19 | End: 2018-05-22 | Stop reason: HOSPADM

## 2018-05-19 RX ADMIN — LATANOPROST 1 DROP: 50 SOLUTION/ DROPS OPHTHALMIC at 22:36

## 2018-05-19 RX ADMIN — ATORVASTATIN CALCIUM 10 MG: 10 TABLET, FILM COATED ORAL at 22:35

## 2018-05-19 RX ADMIN — ASPIRIN 81 MG: 81 TABLET, COATED ORAL at 22:35

## 2018-05-19 RX ADMIN — TAZOBACTAM SODIUM AND PIPERACILLIN SODIUM 3.38 G: 375; 3 INJECTION, SOLUTION INTRAVENOUS at 18:50

## 2018-05-19 RX ADMIN — IOPAMIDOL 85 ML: 612 INJECTION, SOLUTION INTRAVENOUS at 14:30

## 2018-05-19 RX ADMIN — HYDROCODONE BITARTRATE AND ACETAMINOPHEN 1 TABLET: 7.5; 325 TABLET ORAL at 17:09

## 2018-05-19 RX ADMIN — CEFAZOLIN SODIUM 1 G: 1 INJECTION, SOLUTION INTRAVENOUS at 14:29

## 2018-05-19 RX ADMIN — OXYCODONE HYDROCHLORIDE AND ACETAMINOPHEN 1 TABLET: 10; 325 TABLET ORAL at 20:59

## 2018-05-19 RX ADMIN — LEVOTHYROXINE SODIUM 150 MCG: 150 TABLET ORAL at 22:35

## 2018-05-19 RX ADMIN — TETANUS TOXOID, REDUCED DIPHTHERIA TOXOID AND ACELLULAR PERTUSSIS VACCINE, ADSORBED 0.5 ML: 5; 2.5; 8; 8; 2.5 SUSPENSION INTRAMUSCULAR at 14:43

## 2018-05-19 RX ADMIN — DOXYCYCLINE 100 MG: 100 CAPSULE ORAL at 15:44

## 2018-05-19 RX ADMIN — MORPHINE SULFATE 4 MG: 10 INJECTION INTRAVENOUS at 23:33

## 2018-05-19 RX ADMIN — SERTRALINE HYDROCHLORIDE 100 MG: 100 TABLET ORAL at 22:35

## 2018-05-19 RX ADMIN — ONDANSETRON 4 MG: 2 INJECTION INTRAMUSCULAR; INTRAVENOUS at 17:25

## 2018-05-19 NOTE — PLAN OF CARE
Problem: Skin Injury Risk (Adult)  Goal: Identify Related Risk Factors and Signs and Symptoms  Outcome: Outcome(s) achieved Date Met: 05/19/18

## 2018-05-19 NOTE — H&P
CTS H&P  Enio Tapia  [id number]  1950    Patient Care Team:  Troy Mckay MD as PCP - General (Family Medicine)  Troy Mckay MD as PCP - Family Medicine    CC: My chest is pretty sore      Reason for Consult:  Right chest wall hematoma    HPI: 68-year-old  male sustained trauma to the right anterior chest wall in an altercation with a horse (animal bite).  The patient now has a large hematoma of the anterior chest wall.  He complains of mild soreness in this area.  He has full normal use of his right arm, no evidence of broken bones, no current respiratory distress or impending airway disruption.  This gentleman is otherwise healthy.  He had a stent placed in March 2017 and is currently on Plavix ( [id number]).  CT scan reviewed.  No evidence of intrathoracic trauma.  Hematoma is large but seems to be confined to the anterior chest wall and to the right side of the neck.    Active Problems:    * No active hospital problems. *      Review of Systems:  General: No anorexia, no weight loss, general malaise and weakness.  No fever chills or night sweats.  HEENT: No headaches no visual changes no hearing loss no rhinitis no pharyngitis  Pulmonary: No shortness of breath, no cough, no hemoptysis  Heart: No palpitations,  No malaise or shortness of breath, no atrial fibrillation, no bradycardia, no syncope.  No anginal quality chest pain.  Gastrointestinal: No nausea, vomiting, diarrhea, or constipation. No acholic stool, no jaundice.  Renal: No dysuria, no frequency, no hematuria.  Skin: No rash, no skin lesions, no skin tumors.  Neurologic: No seizures, no muscle weakness, no sensory deficit, no amaurosis,  Psychiatric: No anxiety, no history of psychosis  Hematologic: No bleeding history, no ease of bruising, no history of blood disorder.  All other systems were reviewed and are negative.    History  Past Medical History:   Diagnosis Date   • Arrhythmia    • Arthritis    • Coronary artery  disease    • Coronary artery disease involving native coronary artery of native heart with unstable angina pectoris 3/31/2017   • Disease of thyroid gland    • Hyperlipidemia    • Hypertension    • Mental disorder      Past Surgical History:   Procedure Laterality Date   • CARDIAC CATHETERIZATION     • CARDIAC CATHETERIZATION N/A 3/31/2017    Procedure: Left Heart Cath;  Surgeon: Enio Benavidez MD;  Location: [facility] CATH INVASIVE LOCATION;  Service:    • CORONARY STENT PLACEMENT  10/16/2004   • TONSILLECTOMY     • VASECTOMY  08/1998     Family History   Problem Relation Age of Onset   • Heart disease Mother    • Heart failure Mother    • Pneumonia Father      Social History   Substance Use Topics   • Smoking status: Never Smoker   • Smokeless tobacco: Former User     Types: Chew, Snuff     Quit date: 01/2002   • Alcohol use 7.2 oz/week     12 Cans of beer per week       (Not in a hospital admission)  Allergies:  Statins    Objective    Vital Signs  Temp:  [98.2 °F (36.8 °C)] 98.2 °F (36.8 °C)  Heart Rate:  [45-75] 75  Resp:  [20] 20  BP: (151-168)/(70-77) 151/70    Physical Exam:  General Appearance: alert, appears stated age and cooperative  Head: normocephalic, without obvious abnormality and atraumatic  Ears/Nose: no rhinitis, external ears normal  Throat:  no oral lesions, no pharyngitis  Neck: no adenopathy, suppple, trachea midline, no thyromegaly, no carotid bruit and no JVD  Chest Wall: large hematoma anterior chest wall with small area skin disruption  Lungs: clear to auscultation, respirations regular, respirations even and respirations unlabored  Heart: regular rhythm & normal rate, normal S1, S2, no murmur  Abdomen: normal bowel sounds, no masses, soft, non-tender  Extremities: moves extremities well and no edema  Pulses: pulses palpable and equal bilaterally (femoral, DP, PT)  Skin: no bleeding, bruising or rash  Neurologic: mental status orientated to person, place, time and situation, CN intact, no  motor or sensory loss          Data Review:    Results from last 7 days  Lab Units 05/19/18  1419   WBC 10*3/mm3 5.79   HEMOGLOBIN g/dL 13.0*   HEMATOCRIT % 37.2*   PLATELETS 10*3/mm3 180       Results from last 7 days  Lab Units 05/19/18  1458   SODIUM mmol/L 140   POTASSIUM mmol/L 4.3   CHLORIDE mmol/L 109   CO2 mmol/L 25.0   BUN mg/dL 19   CREATININE mg/dL 1.10   GLUCOSE mg/dL 129*   CALCIUM mg/dL 9.0         Imaging Results (last 72 hours)     Procedure Component Value Units Date/Time    CT Chest With Contrast [[id number]] Collected:  05/19/18 1424     Updated:  05/19/18 1508    Narrative:       EXAM:    CT Chest With Intravenous Contrast    CLINICAL HISTORY:    68 years old, male; Injury or trauma; Injury  Bit by horse; Initial   encounter; Bite and blunt trauma (contusions or hematomas); Additional info:   Chest trauma, blunt    TECHNIQUE:    Axial computed tomography images of the chest with intravenous contrast.  All   CT scans at this facility use one or more dose reduction techniques, viz.:   automated exposure control; ma/kV adjustment per patient size (including   targeted exams where dose is matched to indication; i.e. head); or iterative   reconstruction technique.    Coronal and sagittal reformatted images were created and reviewed.    CONTRAST:    95 mL of isovue 300 administered intravenously.    COMPARISON:    No relevant prior studies available.    FINDINGS:    PULMONARY ARTERIES: The exam was not performed by CTA technique (5 mm axial   collimation was used), but the contrast bolus is good for the pulmonary   arteries. The exam excludes a pulmonary embolism through the segmental branch   arteries.    AORTA/VASCULATURE: No thoracic aortic aneurysm or dissection. Minimal   atherosclerotic calcification of the arch.    MEDIASTINUM: No pneumomediastinum. No mediastinal hematoma. The heart size is   within normal limits. Multivessel calcific CAD. The esophagus is unremarkable.   The thyroid is quite  involuted, with beam hardening artifact limiting fine   detail for nodules. No obvious large thyroid nodules.    PLEURA: No pleural effusion or pneumothorax.    LUNGS/AIRWAY: No acute findings. Peripheral right upper lobe granuloma. The   lungs, trachea, and central airways are otherwise clear.    LYMPH NODES: No significant mediastinal, axillary, or hilar lymphadenopathy.    CHEST WALL: There is a large hematoma of the right anterior chest wall (breast   region), with a few small scattered areas of contrast extravasation (series 2   image 23 and separately on image 19). Altogether, this hematoma measures at   least 8.7 cm AP x 11.1 cm TR x  9.4 cm CC. The deep margin of this is abuts   versus extend into  right pectoralis major muscle (intramuscular hematoma).   There is also intramuscular hematoma/marked asymmetric enlargement of the right   sternocleidomastoid (coronal image 28), extending above the field-of-view.   There is ventral midline at the chest wall hematoma overlying the sternum,   possibly extending subperiosteal. However, no sternal fracture is identified.    UPPER ABDOMEN: No free fluid or acute abnormality within the field-of-view. The   0.6 cm hypodense focus in the right hepatic lobe is too small to characterize,   statistically most likely a small cyst or hemangioma. Normal spleen size.   Normal bilateral adrenal glands.    MUSCULOSKELETAL: No acute fracture. No acute abnormality. The sternum and   clavicles appear intact. No thoracic compressions.      Impression:         1. Large right anterior chest wall hematoma in the subcutaneous fat, overlying   the right pectoralis major muscle. There are a few small foci of contrast   extravasation into the hematoma (active bleeding), as above. The posterior   margin of the hematoma is either within or abuts and deforms the right   pectoralis major muscle. Asymmetric enlargement/sizeable intramuscular hematoma   of the right sternocleidomastoid.  2.  There is also hematoma extending midline/bilateral ventral paramedian   overlying the sternum. No sternal fracture detected. No retrosternal hematoma.  3. No intrathoracic acute traumatic abnormality is identified.  4. Calcific coronary artery disease.  5. This exam excludes pulmonary embolism through the segmental branch arteries.      THIS DOCUMENT HAS BEEN ELECTRONICALLY SIGNED BY ESSENCE SEGURA MD            Imaging:CT Scan of chest reviewed (see report above)        Assessment: hematoma anterior chest wall       Plan:  Admit  Watch hematoma for enlargement, infection, airway comprise.  Evaluate pectoralis re: ? tear or ruptured tendon. I have reviewed, verified, and confirmed the above history and current status.  I have examined the patient and confirmed the above physical findings.Above plan and treatment regimen discussed in detail with patient.  Options of treatment, attendant risks vs benefits, and my recommendations were discussed and all questions answered.      Gerardo Gutierrez MD  05/19/18  3:51 PM

## 2018-05-19 NOTE — ED PROVIDER NOTES
Subjective   Enio Tapia is a 68 y.o.male who presents to the ED with complaints of a horse bite. The patient was bitten by a horse this morning on the right side of his chest. He now has worsening swelling over the right anterior chest wall. He denies shortness of breath or any other acute complaints.         History provided by:  Patient  Chest Pain   Pain location:  R chest  Pain radiates to:  Does not radiate  Pain severity:  Unable to specify  Onset quality:  Sudden  Duration:  1 day  Timing:  Constant  Progression:  Worsening  Context comment:  Horse bite  Relieved by:  None tried  Worsened by:  Nothing  Ineffective treatments:  None tried  Associated symptoms: no shortness of breath    Risk factors: coronary artery disease, high cholesterol, hypertension and male sex    Risk factors: no smoking        Review of Systems   Respiratory: Negative for shortness of breath.    Cardiovascular: Positive for chest pain.   Skin: Positive for color change (hematoma right chest).   All other systems reviewed and are negative.      Past Medical History:   Diagnosis Date   • Arrhythmia    • Arthritis    • Coronary artery disease    • Coronary artery disease involving native coronary artery of native heart with unstable angina pectoris 3/31/2017   • Disease of thyroid gland    • Hyperlipidemia    • Hypertension    • Mental disorder        Allergies   Allergen Reactions   • Statins Myalgia       Past Surgical History:   Procedure Laterality Date   • CARDIAC CATHETERIZATION     • CARDIAC CATHETERIZATION N/A 3/31/2017    Procedure: Left Heart Cath;  Surgeon: Enio Benavidez MD;  Location: [facility] CATH INVASIVE LOCATION;  Service:    • CORONARY STENT PLACEMENT  10/16/2004   • TONSILLECTOMY     • VASECTOMY  08/1998       Family History   Problem Relation Age of Onset   • Heart disease Mother    • Heart failure Mother    • Pneumonia Father        Social History     Social History   • Marital status:      Social History  Main Topics   • Smoking status: Never Smoker   • Smokeless tobacco: Former User     Types: Chew, Snuff     Quit date: 01/2002   • Alcohol use 7.2 oz/week     12 Cans of beer per week   • Drug use: No   • Sexual activity: Defer     Other Topics Concern   • Not on file         Objective   Physical Exam   Constitutional: He is oriented to person, place, and time. He appears well-developed and well-nourished. No distress.   HENT:   Head: Normocephalic and atraumatic.   Eyes: Conjunctivae are normal.   Neck: Normal range of motion. Neck supple.   Cardiovascular: Normal rate, regular rhythm and normal heart sounds.    No murmur heard.  Pulmonary/Chest: Effort normal and breath sounds normal. No respiratory distress. He exhibits swelling.   Large right sided chest wall hematoma. Bruising consistent with animal bite on the right side above the nipple. Superficial abrasions over the bite. Small expansion to the right base of the neck. Phonation normal. Breath sounds normal.   Musculoskeletal: He exhibits no edema.   Neurological: He is alert and oriented to person, place, and time.   Skin: Skin is warm and dry.   Psychiatric: He has a normal mood and affect. His behavior is normal.   Nursing note and vitals reviewed.      Procedures         ED Course     Nurse called me urgently to room when pt was roomed, I went right away.  Requested IV access and stat CT chest to eval extent of hematoma, look for evidence of neck involvement.  I went to CT with patient, reviewed images in real time.  Ice and pressure applied.  I consulted Dr Gutierrez who came to the ED and evaluated patient and will admit him.  Patient stable on serial rechecks, no hoarseness or voice change or difficulty breathing.  Discussed exam findings, test results so far and concerns in detail at the bedside.  Discussed need for admission for further evaluation and treatment.                  MDM  Number of Diagnoses or Management Options  Hematoma of neck, initial  encounter:   Hematoma of right chest wall, initial encounter:   Horse bite, initial encounter:      Amount and/or Complexity of Data Reviewed  Clinical lab tests: ordered and reviewed  Tests in the radiology section of CPT®: ordered and reviewed  Discuss the patient with other providers: yes  Independent visualization of images, tracings, or specimens: yes        Final diagnoses:   Hematoma of right chest wall, initial encounter   Hematoma of neck, initial encounter   Horse bite, initial encounter       Documentation assistance provided by tracey Nicholson.  Information recorded by the scribe was done at my direction and has been verified and validated by me.     Joann Nicholson  05/19/18 1506       Manuel June MD  05/19/18 4129

## 2018-05-19 NOTE — PROGRESS NOTES
"Pharmacy Consult-Piperacillin-Tazobactam Dosing  Enio Tapia is a  68 y.o. male receiving piperacillin-tazobactam therapy.     Indication: Horse bite (skin and soft tissue infection)  Consulting Provider: Dr. Gutierrez  ID Consult: No    Current Antimicrobial Therapy    Piperacillin-tazobactam 3.375 g IV every 8 hours - day 1    Allergies  Allergies as of 05/19/2018 - Reviewed 05/19/2018   Allergen Reaction Noted   • Statins Myalgia 06/10/2016     Labs    Results from last 7 days     Lab Units 05/19/18  1458   BUN mg/dL 19   CREATININE mg/dL 1.10     Results from last 7 days     Lab Units 05/19/18  1419   WBC 10*3/mm3 5.79   Evaluation of Dosing    Ht - 175.3 cm (69\")  Wt - 77.7 kg (171 lb 5 oz)    Estimated Creatinine Clearance: 69.7 mL/min (by C-G formula based on SCr of 1.1 mg/dL).    Intake & Output (last 3 days)       None          Microbiology and Radiology  Microbiology Results (last 10 days)       ** No results found for the last 240 hours. **          Assessment/Plan:    Pharmacy to dose piperacillin-tazobactam for skin and soft tissue infection (horse bite).  Per patient's renal function, ordered Zosyn 3.375 g IV every 8 hours per extended infusion protocol.  Monitor renal function, cultures and sensitivities, and clinical status, and adjust regimen as necessary.  Pharmacy will continue to follow.    Nancy Crespo, PharmD  5/19/2018  5:27 PM   "

## 2018-05-20 ENCOUNTER — APPOINTMENT (OUTPATIENT)
Dept: GENERAL RADIOLOGY | Facility: HOSPITAL | Age: 68
End: 2018-05-20

## 2018-05-20 LAB
ALBUMIN SERPL-MCNC: 3.4 G/DL (ref 3.2–4.8)
ALBUMIN/GLOB SERPL: 1.5 G/DL (ref 1.5–2.5)
ALP SERPL-CCNC: 44 U/L (ref 25–100)
ALT SERPL W P-5'-P-CCNC: 34 U/L (ref 7–40)
ANION GAP SERPL CALCULATED.3IONS-SCNC: 6 MMOL/L (ref 3–11)
AST SERPL-CCNC: 22 U/L (ref 0–33)
BASOPHILS # BLD AUTO: 0.01 10*3/MM3 (ref 0–0.2)
BASOPHILS NFR BLD AUTO: 0.1 % (ref 0–1)
BILIRUB SERPL-MCNC: 0.4 MG/DL (ref 0.3–1.2)
BUN BLD-MCNC: 20 MG/DL (ref 9–23)
BUN BLDA-MCNC: 25 MG/DL (ref 8–26)
BUN/CREAT SERPL: 22.2 (ref 7–25)
CA-I BLDA-SCNC: 1.25 MMOL/L (ref 1.2–1.32)
CALCIUM SPEC-SCNC: 8.2 MG/DL (ref 8.7–10.4)
CHLORIDE BLDA-SCNC: 104 MMOL/L (ref 98–109)
CHLORIDE SERPL-SCNC: 107 MMOL/L (ref 99–109)
CK SERPL-CCNC: 225 U/L (ref 26–174)
CO2 BLDA-SCNC: 25 MMOL/L (ref 24–29)
CO2 SERPL-SCNC: 23 MMOL/L (ref 20–31)
CREAT BLD-MCNC: 0.9 MG/DL (ref 0.6–1.3)
CREAT BLDA-MCNC: 1.2 MG/DL (ref 0.6–1.3)
DEPRECATED RDW RBC AUTO: 47.4 FL (ref 37–54)
DEPRECATED RDW RBC AUTO: 47.5 FL (ref 37–54)
EOSINOPHIL # BLD AUTO: 0.02 10*3/MM3 (ref 0–0.3)
EOSINOPHIL NFR BLD AUTO: 0.2 % (ref 0–3)
ERYTHROCYTE [DISTWIDTH] IN BLOOD BY AUTOMATED COUNT: 13.8 % (ref 11.3–14.5)
ERYTHROCYTE [DISTWIDTH] IN BLOOD BY AUTOMATED COUNT: 14 % (ref 11.3–14.5)
GFR SERPL CREATININE-BSD FRML MDRD: 84 ML/MIN/1.73
GLOBULIN UR ELPH-MCNC: 2.3 GM/DL
GLUCOSE BLD-MCNC: 149 MG/DL (ref 70–100)
GLUCOSE BLDC GLUCOMTR-MCNC: 125 MG/DL (ref 70–130)
HCT VFR BLD AUTO: 28.7 % (ref 38.9–50.9)
HCT VFR BLD AUTO: 29.2 % (ref 38.9–50.9)
HCT VFR BLD AUTO: 30.3 % (ref 38.9–50.9)
HCT VFR BLDA CALC: 36 % (ref 38–51)
HGB BLD-MCNC: 10.1 G/DL (ref 13.1–17.5)
HGB BLD-MCNC: 10.4 G/DL (ref 13.1–17.5)
HGB BLD-MCNC: 9.7 G/DL (ref 13.1–17.5)
HGB BLDA-MCNC: 12.2 G/DL (ref 12–17)
IMM GRANULOCYTES # BLD: 0.02 10*3/MM3 (ref 0–0.03)
IMM GRANULOCYTES NFR BLD: 0.2 % (ref 0–0.6)
INR PPP: 1.11 (ref 0.91–1.09)
LYMPHOCYTES # BLD AUTO: 0.95 10*3/MM3 (ref 0.6–4.8)
LYMPHOCYTES NFR BLD AUTO: 11.5 % (ref 24–44)
MAGNESIUM SERPL-MCNC: 1.8 MG/DL (ref 1.3–2.7)
MCH RBC QN AUTO: 32 PG (ref 27–31)
MCH RBC QN AUTO: 32.4 PG (ref 27–31)
MCHC RBC AUTO-ENTMCNC: 34.3 G/DL (ref 32–36)
MCHC RBC AUTO-ENTMCNC: 34.6 G/DL (ref 32–36)
MCV RBC AUTO: 93.2 FL (ref 80–99)
MCV RBC AUTO: 93.6 FL (ref 80–99)
MONOCYTES # BLD AUTO: 0.57 10*3/MM3 (ref 0–1)
MONOCYTES NFR BLD AUTO: 6.9 % (ref 0–12)
NEUTROPHILS # BLD AUTO: 6.74 10*3/MM3 (ref 1.5–8.3)
NEUTROPHILS NFR BLD AUTO: 81.3 % (ref 41–71)
PLATELET # BLD AUTO: 166 10*3/MM3 (ref 150–450)
PLATELET # BLD AUTO: 183 10*3/MM3 (ref 150–450)
PMV BLD AUTO: 9.2 FL (ref 6–12)
PMV BLD AUTO: 9.4 FL (ref 6–12)
POTASSIUM BLD-SCNC: 4.2 MMOL/L (ref 3.5–5.5)
POTASSIUM BLDA-SCNC: 4.4 MMOL/L (ref 3.5–4.9)
PROT SERPL-MCNC: 5.7 G/DL (ref 5.7–8.2)
PROTHROMBIN TIME: 11.7 SECONDS (ref 9.6–11.5)
RBC # BLD AUTO: 3.12 10*6/MM3 (ref 4.2–5.76)
RBC # BLD AUTO: 3.25 10*6/MM3 (ref 4.2–5.76)
SODIUM BLD-SCNC: 136 MMOL/L (ref 132–146)
SODIUM BLDA-SCNC: 141 MMOL/L (ref 138–146)
TROPONIN I SERPL-MCNC: <0.006 NG/ML
WBC NRBC COR # BLD: 7.48 10*3/MM3 (ref 3.5–10.8)
WBC NRBC COR # BLD: 8.29 10*3/MM3 (ref 3.5–10.8)

## 2018-05-20 PROCEDURE — 99224 PR SBSQ OBSERVATION CARE/DAY 15 MINUTES: CPT | Performed by: THORACIC SURGERY (CARDIOTHORACIC VASCULAR SURGERY)

## 2018-05-20 PROCEDURE — 85027 COMPLETE CBC AUTOMATED: CPT | Performed by: THORACIC SURGERY (CARDIOTHORACIC VASCULAR SURGERY)

## 2018-05-20 PROCEDURE — 84484 ASSAY OF TROPONIN QUANT: CPT | Performed by: PHYSICIAN ASSISTANT

## 2018-05-20 PROCEDURE — 85014 HEMATOCRIT: CPT | Performed by: THORACIC SURGERY (CARDIOTHORACIC VASCULAR SURGERY)

## 2018-05-20 PROCEDURE — 25010000002 PIPERACILLIN SOD-TAZOBACTAM PER 1 G

## 2018-05-20 PROCEDURE — 85018 HEMOGLOBIN: CPT | Performed by: THORACIC SURGERY (CARDIOTHORACIC VASCULAR SURGERY)

## 2018-05-20 PROCEDURE — 25010000002 PIPERACILLIN SOD-TAZOBACTAM PER 1 G: Performed by: PHYSICIAN ASSISTANT

## 2018-05-20 PROCEDURE — 82553 CREATINE MB FRACTION: CPT | Performed by: PHYSICIAN ASSISTANT

## 2018-05-20 PROCEDURE — G0378 HOSPITAL OBSERVATION PER HR: HCPCS

## 2018-05-20 PROCEDURE — 25010000002 ONDANSETRON PER 1 MG: Performed by: PHYSICIAN ASSISTANT

## 2018-05-20 PROCEDURE — 82550 ASSAY OF CK (CPK): CPT | Performed by: PHYSICIAN ASSISTANT

## 2018-05-20 PROCEDURE — 71045 X-RAY EXAM CHEST 1 VIEW: CPT

## 2018-05-20 PROCEDURE — 83735 ASSAY OF MAGNESIUM: CPT | Performed by: PHYSICIAN ASSISTANT

## 2018-05-20 PROCEDURE — 80053 COMPREHEN METABOLIC PANEL: CPT | Performed by: PHYSICIAN ASSISTANT

## 2018-05-20 PROCEDURE — 85610 PROTHROMBIN TIME: CPT | Performed by: THORACIC SURGERY (CARDIOTHORACIC VASCULAR SURGERY)

## 2018-05-20 RX ORDER — NITROGLYCERIN 0.4 MG/1
TABLET SUBLINGUAL
Status: COMPLETED
Start: 2018-05-20 | End: 2018-05-20

## 2018-05-20 RX ADMIN — LIDOCAINE HYDROCHLORIDE: 20 SOLUTION ORAL; TOPICAL at 00:21

## 2018-05-20 RX ADMIN — CARVEDILOL 3.12 MG: 3.12 TABLET, FILM COATED ORAL at 08:37

## 2018-05-20 RX ADMIN — ISOSORBIDE MONONITRATE 60 MG: 60 TABLET, EXTENDED RELEASE ORAL at 08:37

## 2018-05-20 RX ADMIN — ASPIRIN 81 MG: 81 TABLET, COATED ORAL at 20:13

## 2018-05-20 RX ADMIN — NITROGLYCERIN 0.4 MG: 0.4 TABLET SUBLINGUAL at 00:03

## 2018-05-20 RX ADMIN — LOSARTAN POTASSIUM 50 MG: 50 TABLET ORAL at 20:13

## 2018-05-20 RX ADMIN — LATANOPROST 1 DROP: 50 SOLUTION/ DROPS OPHTHALMIC at 20:14

## 2018-05-20 RX ADMIN — ATORVASTATIN CALCIUM 10 MG: 10 TABLET, FILM COATED ORAL at 20:13

## 2018-05-20 RX ADMIN — SERTRALINE HYDROCHLORIDE 100 MG: 100 TABLET ORAL at 20:13

## 2018-05-20 RX ADMIN — ONDANSETRON 4 MG: 2 INJECTION INTRAMUSCULAR; INTRAVENOUS at 00:17

## 2018-05-20 RX ADMIN — SILVER SULFADIAZINE: 10 CREAM TOPICAL at 16:42

## 2018-05-20 RX ADMIN — AMLODIPINE BESYLATE 5 MG: 5 TABLET ORAL at 08:38

## 2018-05-20 RX ADMIN — TAZOBACTAM SODIUM AND PIPERACILLIN SODIUM 3.38 G: 375; 3 INJECTION, SOLUTION INTRAVENOUS at 01:42

## 2018-05-20 RX ADMIN — ACYCLOVIR 400 MG: 200 CAPSULE ORAL at 08:37

## 2018-05-20 RX ADMIN — LEVOTHYROXINE SODIUM 150 MCG: 150 TABLET ORAL at 20:13

## 2018-05-20 RX ADMIN — TAZOBACTAM SODIUM AND PIPERACILLIN SODIUM 3.38 G: 375; 3 INJECTION, SOLUTION INTRAVENOUS at 16:42

## 2018-05-20 RX ADMIN — TAZOBACTAM SODIUM AND PIPERACILLIN SODIUM 3.38 G: 375; 3 INJECTION, SOLUTION INTRAVENOUS at 08:38

## 2018-05-20 NOTE — PROGRESS NOTES
POD * No surgery found *       LOS: 0 days   Patient Care Team:  Troy Mckay MD as PCP - General (Family Medicine)  Troy Mckay MD as PCP - Family Medicine    Chief complaint:chest wall pain    Subjective   (+) chest pain, denies shortness of breath  (+) pain at that bite area as well as a large hematoma complicated with nausea vomiting during the evening last night  Objective    Vital Signs  Temp:  [97.4 °F (36.3 °C)-98.2 °F (36.8 °C)] 97.8 °F (36.6 °C)  Heart Rate:  [45-82] 51  Resp:  [18-20] 18  BP: ()/(59-77) 140/63    Physical Exam:   General Appearance: alert, appears stated age and cooperative   Lungs: clear bilaterally   Heart: Regular rate and rhythm   Chest Wall: large hematoma anterior right chest wall (unchanged from yesterday),  Moderate area  of second degree skin disruption        Results     Results from last 7 days  Lab Units 05/20/18  0430   WBC 10*3/mm3 7.48   HEMOGLOBIN g/dL 10.1*   HEMATOCRIT % 29.2*   PLATELETS 10*3/mm3 166       Results from last 7 days  Lab Units 05/20/18  0011   SODIUM mmol/L 136   POTASSIUM mmol/L 4.2   CHLORIDE mmol/L 107   CO2 mmol/L 23.0   BUN mg/dL 20   CREATININE mg/dL 0.90   GLUCOSE mg/dL 149*   CALCIUM mg/dL 8.2*               Assessment: hematoma right chest wall secondary to horse bite        H&H is stable  Plan   Patient seen and evaluated by Dr. Gutierrez  Plavix be been restarted in the a.m.  Continue current treatment  Pharmacy recommended Zosyn for antibiotic coverage    Camilo Clemons PA-C  05/20/18  10:40 AM     Status as outlined above.  The hematoma is essentially unchanged from yesterday's exam.  Will DC the compression bandage and the ice packs.  Believe we should use Silvadene on the scan.  Continue the antibiotic coverage until the a.m.  If the patient remains stable plan discharge in a.m. I have reviewed, verified, and confirmed the above history and current status.  I have examined the patient and confirmed the above physical  findings.    Gerardo Gutierrez MD  CTSurgery  05/20/18   11:28 AM

## 2018-05-20 NOTE — PLAN OF CARE
Problem: Patient Care Overview  Goal: Plan of Care Review  Outcome: Ongoing (interventions implemented as appropriate)   05/20/18 0416   Coping/Psychosocial   Plan of Care Reviewed With patient;spouse   Plan of Care Review   Progress no change     Goal: Individualization and Mutuality  Outcome: Ongoing (interventions implemented as appropriate)    Goal: Discharge Needs Assessment  Outcome: Ongoing (interventions implemented as appropriate)    Goal: Interprofessional Rounds/Family Conf  Outcome: Ongoing (interventions implemented as appropriate)   05/20/18 0416   Interdisciplinary Rounds/Family Conf   Participants family       Problem: Skin Injury Risk (Adult)  Goal: Skin Health and Integrity  Outcome: Ongoing (interventions implemented as appropriate)   05/20/18 0416   Skin Injury Risk (Adult)   Skin Health and Integrity making progress toward outcome

## 2018-05-21 ENCOUNTER — APPOINTMENT (OUTPATIENT)
Dept: GENERAL RADIOLOGY | Facility: HOSPITAL | Age: 68
End: 2018-05-21

## 2018-05-21 PROBLEM — S20.211A HEMATOMA OF RIGHT CHEST WALL: Status: ACTIVE | Noted: 2018-05-21

## 2018-05-21 LAB
CK MB SERPL-CCNC: 6.19 NG/ML (ref 0–5)
CK MB SERPL-RTO: 2.8 % (ref 0–3)
CK SERPL-CCNC: 225 U/L (ref 26–174)
HCT VFR BLD AUTO: 25 % (ref 38.9–50.9)
HCT VFR BLD AUTO: 25.3 % (ref 38.9–50.9)
HCT VFR BLD AUTO: 26.1 % (ref 38.9–50.9)
HGB BLD-MCNC: 8.6 G/DL (ref 13.1–17.5)
HGB BLD-MCNC: 8.7 G/DL (ref 13.1–17.5)
HGB BLD-MCNC: 9 G/DL (ref 13.1–17.5)
INR PPP: 1.06 (ref 0.91–1.09)
PROTHROMBIN TIME: 11.1 SECONDS (ref 9.6–11.5)

## 2018-05-21 PROCEDURE — 85610 PROTHROMBIN TIME: CPT | Performed by: THORACIC SURGERY (CARDIOTHORACIC VASCULAR SURGERY)

## 2018-05-21 PROCEDURE — 85014 HEMATOCRIT: CPT | Performed by: THORACIC SURGERY (CARDIOTHORACIC VASCULAR SURGERY)

## 2018-05-21 PROCEDURE — G0378 HOSPITAL OBSERVATION PER HR: HCPCS

## 2018-05-21 PROCEDURE — 71046 X-RAY EXAM CHEST 2 VIEWS: CPT

## 2018-05-21 PROCEDURE — 99224 PR SBSQ OBSERVATION CARE/DAY 15 MINUTES: CPT | Performed by: THORACIC SURGERY (CARDIOTHORACIC VASCULAR SURGERY)

## 2018-05-21 PROCEDURE — 85018 HEMOGLOBIN: CPT | Performed by: THORACIC SURGERY (CARDIOTHORACIC VASCULAR SURGERY)

## 2018-05-21 PROCEDURE — 25010000002 PIPERACILLIN SOD-TAZOBACTAM PER 1 G: Performed by: PHYSICIAN ASSISTANT

## 2018-05-21 PROCEDURE — 25010000002 PIPERACILLIN SOD-TAZOBACTAM PER 1 G

## 2018-05-21 RX ADMIN — SERTRALINE HYDROCHLORIDE 100 MG: 100 TABLET ORAL at 21:25

## 2018-05-21 RX ADMIN — AMLODIPINE BESYLATE 5 MG: 5 TABLET ORAL at 08:54

## 2018-05-21 RX ADMIN — ASPIRIN 81 MG: 81 TABLET, COATED ORAL at 21:25

## 2018-05-21 RX ADMIN — ISOSORBIDE MONONITRATE 60 MG: 60 TABLET, EXTENDED RELEASE ORAL at 08:55

## 2018-05-21 RX ADMIN — LEVOTHYROXINE SODIUM 150 MCG: 150 TABLET ORAL at 21:24

## 2018-05-21 RX ADMIN — ACYCLOVIR 400 MG: 200 CAPSULE ORAL at 08:54

## 2018-05-21 RX ADMIN — ATORVASTATIN CALCIUM 10 MG: 10 TABLET, FILM COATED ORAL at 21:25

## 2018-05-21 RX ADMIN — LATANOPROST 1 DROP: 50 SOLUTION/ DROPS OPHTHALMIC at 21:24

## 2018-05-21 RX ADMIN — SODIUM CHLORIDE 250 ML: 9 INJECTION, SOLUTION INTRAVENOUS at 04:14

## 2018-05-21 RX ADMIN — LOSARTAN POTASSIUM 50 MG: 50 TABLET ORAL at 21:25

## 2018-05-21 RX ADMIN — TAZOBACTAM SODIUM AND PIPERACILLIN SODIUM 3.38 G: 375; 3 INJECTION, SOLUTION INTRAVENOUS at 01:03

## 2018-05-21 RX ADMIN — TAZOBACTAM SODIUM AND PIPERACILLIN SODIUM 3.38 G: 375; 3 INJECTION, SOLUTION INTRAVENOUS at 18:39

## 2018-05-21 RX ADMIN — HYDROCODONE BITARTRATE AND ACETAMINOPHEN 1 TABLET: 7.5; 325 TABLET ORAL at 02:49

## 2018-05-21 RX ADMIN — TAZOBACTAM SODIUM AND PIPERACILLIN SODIUM 3.38 G: 375; 3 INJECTION, SOLUTION INTRAVENOUS at 08:57

## 2018-05-21 RX ADMIN — CARVEDILOL 3.12 MG: 3.12 TABLET, FILM COATED ORAL at 08:57

## 2018-05-21 RX ADMIN — SILVER SULFADIAZINE: 10 CREAM TOPICAL at 08:54

## 2018-05-21 NOTE — PROGRESS NOTES
CTS Progress Note        Chief Complaint: Dizziness and chills    Subjective  Patient reports mild dizziness and chills overnight however this is resolved this morning.  Patient also admits to right anterior chest pain however denies any difficulty breathing.      Objective    Physical Exam:   Vital Signs   Temp:  [98 °F (36.7 °C)-99.6 °F (37.6 °C)] 98.6 °F (37 °C)  Heart Rate:  [55-78] 66  Resp:  [16] 16  BP: ()/(45-67) 99/45   GEN: NAD   RESP: Clear to auscultation bilaterally no wheezes, rales or rhonchi    CV: Regular rate and rhythm no murmurs, rubs or gallops   ABD: Soft, nontender/nondistended with normal active bowel sounds    EXT: Warm with good color well-perfused no bilateral lower extremity edema   INT: Anterior chest dressing c/d/i      Intake/Output Summary (Last 24 hours) at 05/21/18 0929  Last data filed at 05/21/18 0521   Gross per 24 hour   Intake              250 ml   Output                0 ml   Net              250 ml     Results       Results from last 7 days  Lab Units 05/21/18  0731  05/20/18  0430   WBC 10*3/mm3  --   --  7.48   HEMOGLOBIN g/dL 9.0*  < > 10.1*   HEMATOCRIT % 26.1*  < > 29.2*   PLATELETS 10*3/mm3  --   --  166   < > = values in this interval not displayed.    Results from last 7 days  Lab Units 05/20/18  0011   SODIUM mmol/L 136   POTASSIUM mmol/L 4.2   CHLORIDE mmol/L 107   CO2 mmol/L 23.0   BUN mg/dL 20   CREATININE mg/dL 0.90   GLUCOSE mg/dL 149*   CALCIUM mg/dL 8.2*       Results from last 7 days  Lab Units 05/21/18  0731   INR  1.06         Assessment/Plan     Active Problems:    * No active hospital problems. *  Right anterior chest trauma with hematoma secondary to horse bite  Hypotension  Anemia      Plan   We will plan on keeping the patient at least 1 more night to continue to monitor his H&H considering it his continued to trend downward and monitor his blood pressure.  We'll continue the IV antibiotics in the interim. Will also continue to hold his  Plavix.    RFANCO Arreola  05/21/18  9:29 AM     As above.  Probably home in AM. I have reviewed, verified, and confirmed the above history and current status.  I have examined the patient and confirmed the above physical findings.    Gerardo Gutierrez MD  CTSurgery  05/21/18   12:18 PM

## 2018-05-21 NOTE — PLAN OF CARE
Problem: Patient Care Overview  Goal: Plan of Care Review  Outcome: Ongoing (interventions implemented as appropriate)   05/21/18 6003   Coping/Psychosocial   Plan of Care Reviewed With patient   Plan of Care Review   Progress no change   OTHER   Outcome Summary Pt with hypotension this shift; improved wtih 250mL bolus ordered per FRANCO Phillip. Otherwise, no acute overnight events. Possible D/C today       Problem: Skin Injury Risk (Adult)  Goal: Identify Related Risk Factors and Signs and Symptoms  Outcome: Ongoing (interventions implemented as appropriate)    Goal: Skin Health and Integrity  Outcome: Ongoing (interventions implemented as appropriate)

## 2018-05-21 NOTE — PLAN OF CARE
Problem: Patient Care Overview  Goal: Plan of Care Review  Outcome: Ongoing (interventions implemented as appropriate)    Goal: Individualization and Mutuality  Outcome: Ongoing (interventions implemented as appropriate)    Goal: Discharge Needs Assessment  Outcome: Ongoing (interventions implemented as appropriate)    Goal: Interprofessional Rounds/Family Conf  Outcome: Ongoing (interventions implemented as appropriate)      Problem: Skin Injury Risk (Adult)  Goal: Identify Related Risk Factors and Signs and Symptoms  Outcome: Ongoing (interventions implemented as appropriate)

## 2018-05-21 NOTE — PROGRESS NOTES
Discharge Planning Assessment  [facility]     Patient Name: Enio Tapia  MRN: [id number]  Today's Date: 5/21/2018    Admit Date: 5/19/2018          Discharge Needs Assessment     Row Name 05/21/18 5100       Living Environment    Lives With spouse    Current Living Arrangements home/apartment/condo    Primary Care Provided by self    Provides Primary Care For no one    Family Caregiver if Needed spouse    Family Caregiver Names Judit Tapia    Quality of Family Relationships unable to assess    Able to Return to Prior Arrangements yes       Resource/Environmental Concerns    Resource/Environmental Concerns none       Transition Planning    Patient/Family Anticipates Transition to home    Patient/Family Anticipated Services at Transition none    Transportation Anticipated family or friend will provide       Discharge Needs Assessment    Readmission Within the Last 30 Days no previous admission in last 30 days    Concerns to be Addressed no discharge needs identified;denies needs/concerns at this time    Equipment Currently Used at Home none    Anticipated Changes Related to Illness none    Equipment Needed After Discharge bipap/cpap            Discharge Plan     Row Name 05/21/18 1342       Plan    Plan home    Patient/Family in Agreement with Plan yes    Plan Comments Spoke with patient at bedside regarding discharge planning.  Patient denies use of Home Health and has a CPAP provided through [employer].  Patient reports having prescription coverage.  Patient lives in a single level house with 3 steps to access.  Patient denies home safety concerns.  Patient plan is to discharge home via car with family to transport when medically ready.    Final Discharge Disposition Code 01 - home or self-care        Destination     No service coordination in this encounter.      Durable Medical Equipment     No service coordination in this encounter.      Dialysis/Infusion     No service coordination in this  encounter.      Home Medical Care     No service coordination in this encounter.      Social Care     No service coordination in this encounter.        Expected Discharge Date and Time     Expected Discharge Date Expected Discharge Time    May 22, 2018               Demographic Summary     Row Name 05/21/18 1339       General Information    Admission Type observation    Arrived From home    Referral Source admission list    Reason for Consult discharge planning    Preferred Language English     Used During This Interaction no    General Information Comments Troy Mckay MD       Contact Information    Permission Granted to Share Info With     Contact Information Obtained for     Contact Information Comments Judit Tapia, spouse  [phone]            Functional Status     Row Name 05/21/18 1340       Functional Status    Usual Activity Tolerance excellent    Current Activity Tolerance good       Functional Status, IADL    Medications independent    Meal Preparation independent    Housekeeping independent    Laundry independent    Shopping independent       Employment/    Employment/ Comments [employer]            Psychosocial    No documentation.           Abuse/Neglect    No documentation.           Legal    No documentation.           Substance Abuse    No documentation.           Patient Forms    No documentation.         Brittany Aj RN

## 2018-05-22 VITALS
BODY MASS INDEX: 25.37 KG/M2 | OXYGEN SATURATION: 98 % | DIASTOLIC BLOOD PRESSURE: 61 MMHG | RESPIRATION RATE: 18 BRPM | TEMPERATURE: 98.2 F | HEART RATE: 71 BPM | SYSTOLIC BLOOD PRESSURE: 109 MMHG | HEIGHT: 69 IN | WEIGHT: 171.31 LBS

## 2018-05-22 LAB
DEPRECATED RDW RBC AUTO: 47 FL (ref 37–54)
ERYTHROCYTE [DISTWIDTH] IN BLOOD BY AUTOMATED COUNT: 13.8 % (ref 11.3–14.5)
HCT VFR BLD AUTO: 25.5 % (ref 38.9–50.9)
HCT VFR BLD AUTO: 25.8 % (ref 38.9–50.9)
HCT VFR BLD AUTO: 26.9 % (ref 38.9–50.9)
HGB BLD-MCNC: 8.7 G/DL (ref 13.1–17.5)
HGB BLD-MCNC: 8.8 G/DL (ref 13.1–17.5)
HGB BLD-MCNC: 9.2 G/DL (ref 13.1–17.5)
INR PPP: 1.03 (ref 0.91–1.09)
MCH RBC QN AUTO: 32.4 PG (ref 27–31)
MCHC RBC AUTO-ENTMCNC: 34.5 G/DL (ref 32–36)
MCV RBC AUTO: 93.8 FL (ref 80–99)
PLATELET # BLD AUTO: 182 10*3/MM3 (ref 150–450)
PMV BLD AUTO: 9 FL (ref 6–12)
PROTHROMBIN TIME: 10.8 SECONDS (ref 9.6–11.5)
RBC # BLD AUTO: 2.72 10*6/MM3 (ref 4.2–5.76)
WBC NRBC COR # BLD: 5.73 10*3/MM3 (ref 3.5–10.8)

## 2018-05-22 PROCEDURE — 85018 HEMOGLOBIN: CPT | Performed by: THORACIC SURGERY (CARDIOTHORACIC VASCULAR SURGERY)

## 2018-05-22 PROCEDURE — 25010000002 PIPERACILLIN-TAZOBACTAM

## 2018-05-22 PROCEDURE — G0378 HOSPITAL OBSERVATION PER HR: HCPCS

## 2018-05-22 PROCEDURE — 85027 COMPLETE CBC AUTOMATED: CPT | Performed by: PHYSICIAN ASSISTANT

## 2018-05-22 PROCEDURE — 85014 HEMATOCRIT: CPT | Performed by: THORACIC SURGERY (CARDIOTHORACIC VASCULAR SURGERY)

## 2018-05-22 PROCEDURE — 99224 PR SBSQ OBSERVATION CARE/DAY 15 MINUTES: CPT | Performed by: THORACIC SURGERY (CARDIOTHORACIC VASCULAR SURGERY)

## 2018-05-22 PROCEDURE — 99238 HOSP IP/OBS DSCHRG MGMT 30/<: CPT | Performed by: PHYSICIAN ASSISTANT

## 2018-05-22 PROCEDURE — 85610 PROTHROMBIN TIME: CPT | Performed by: THORACIC SURGERY (CARDIOTHORACIC VASCULAR SURGERY)

## 2018-05-22 RX ORDER — HYDROCODONE BITARTRATE AND ACETAMINOPHEN 7.5; 325 MG/1; MG/1
1 TABLET ORAL EVERY 6 HOURS PRN
Qty: 30 TABLET | Refills: 0
Start: 2018-05-22 | End: 2018-05-29

## 2018-05-22 RX ORDER — CEPHALEXIN 500 MG/1
500 CAPSULE ORAL EVERY 12 HOURS SCHEDULED
Qty: 20 CAPSULE | Refills: 0 | Status: SHIPPED | OUTPATIENT
Start: 2018-05-23 | End: 2018-06-02

## 2018-05-22 RX ORDER — CEPHALEXIN 250 MG/1
500 CAPSULE ORAL EVERY 12 HOURS SCHEDULED
Status: DISCONTINUED | OUTPATIENT
Start: 2018-05-23 | End: 2018-05-22 | Stop reason: HOSPADM

## 2018-05-22 RX ADMIN — PIPERACILLIN SODIUM,TAZOBACTAM SODIUM 3.38 G: 3; .375 INJECTION, POWDER, FOR SOLUTION INTRAVENOUS at 08:20

## 2018-05-22 RX ADMIN — ISOSORBIDE MONONITRATE 60 MG: 60 TABLET, EXTENDED RELEASE ORAL at 08:19

## 2018-05-22 RX ADMIN — SILVER SULFADIAZINE: 10 CREAM TOPICAL at 08:20

## 2018-05-22 RX ADMIN — CARVEDILOL 3.12 MG: 3.12 TABLET, FILM COATED ORAL at 08:19

## 2018-05-22 RX ADMIN — ACYCLOVIR 400 MG: 200 CAPSULE ORAL at 08:19

## 2018-05-22 RX ADMIN — AMLODIPINE BESYLATE 5 MG: 5 TABLET ORAL at 08:19

## 2018-05-22 RX ADMIN — PIPERACILLIN SODIUM,TAZOBACTAM SODIUM 3.38 G: 3; .375 INJECTION, POWDER, FOR SOLUTION INTRAVENOUS at 01:48

## 2018-05-22 NOTE — DISCHARGE INSTRUCTIONS
Shower and clean wound daily. Apply Silvadene cream as directed. Keep clean. Call Dr Gutierrez office if wound worsens or does not improve.

## 2018-05-22 NOTE — PROGRESS NOTES
Case Management Discharge Note    Final Note: Spoke with patient at bedside regarding discharge plan.  Patient reports he is going home today and verbalizes no discharge needs at this time.  Patient plan is to discharge home today via car with family to transport.     Destination     No service coordination in this encounter.      Durable Medical Equipment     No service coordination in this encounter.      Dialysis/Infusion     No service coordination in this encounter.      Home Medical Care     No service coordination in this encounter.      Social Care     No service coordination in this encounter.             Final Discharge Disposition Code: 01 - home or self-care

## 2018-05-22 NOTE — PLAN OF CARE
Problem: Patient Care Overview  Goal: Plan of Care Review  Outcome: Ongoing (interventions implemented as appropriate)   05/22/18 0402   Coping/Psychosocial   Plan of Care Reviewed With patient;spouse   Plan of Care Review   Progress improving   OTHER   Outcome Summary No acute overnight events. H&H and VS stable. Plan for home today       Problem: Skin Injury Risk (Adult)  Goal: Identify Related Risk Factors and Signs and Symptoms  Outcome: Ongoing (interventions implemented as appropriate)    Goal: Skin Health and Integrity  Outcome: Ongoing (interventions implemented as appropriate)

## 2018-05-22 NOTE — PROGRESS NOTES
CTS Progress Note    Chief Complaint: Chest wall pain      Subjective  In bed sitting up.  Pleasant.  Conversant.      Objective    Physical Exam:   Vital Signs   Temp:  [98.2 °F (36.8 °C)-99.4 °F (37.4 °C)] 98.2 °F (36.8 °C)  Heart Rate:  [60-79] 63  Resp:  [16-18] 18  BP: (103-127)/(54-63) 103/58   GEN: NAD   CV: Regular without murmur    RESP: Distant but clear to auscultation   ABD: Soft nontender positive bowel sounds    EXT: Warm without peripheral edema   Anterior chest wall: Softball size hematoma in her right anterior chest.  I removed the bandage.  Silvadene cream noted.  Some thick yellow discharge noted on the bandage.  No odor     Results       Results from last 7 days  Lab Units 05/22/18  0446   WBC 10*3/mm3 5.73   HEMOGLOBIN g/dL 8.8*   HEMATOCRIT % 25.5*   PLATELETS 10*3/mm3 182       Results from last 7 days  Lab Units 05/20/18  0011   SODIUM mmol/L 136   POTASSIUM mmol/L 4.2   CHLORIDE mmol/L 107   CO2 mmol/L 23.0   BUN mg/dL 20   CREATININE mg/dL 0.90   GLUCOSE mg/dL 149*   CALCIUM mg/dL 8.2*       Results from last 7 days  Lab Units 05/22/18  0446   INR  1.03             Assessment/Plan   Past to be a stable right anterior chest hematoma secondary to horse bite.  Currently being treated with Silvadene cream and IV Zosyn.  Active Problems:    Hematoma of right chest wall  Anemia--stable last 28 hours      Plan   Discuss with Dr Gutierrez  Questionable Restart Plavix  Continue watch H&H while as an inpatient  FRANCO Cortes  05/22/18  8:08 AM        As above.  Seen earlier. Ready for D/C.  Probably will need a month off work. I have reviewed, verified, and confirmed the above history and current status.  I have examined the patient and confirmed the above physical findings.    Gerardo Gutierrez MD  [facility]  05/22/18   4:08 PM

## 2018-05-23 ENCOUNTER — TRANSITIONAL CARE MANAGEMENT TELEPHONE ENCOUNTER (OUTPATIENT)
Dept: FAMILY MEDICINE CLINIC | Facility: CLINIC | Age: 68
End: 2018-05-23

## 2018-05-23 NOTE — OUTREACH NOTE
TYSON call completed.  Please refer to TCM call flowsheet for call documentation.  Patient is not taking plavix at this time and is going to discuss it with Dr. Ribeiro.  He states he is not going to restart med.

## 2018-05-23 NOTE — PAYOR COMM NOTE
"Enio Tapia (68 y.o. Male)   Auth # [id number]  Shanthi Ash RN, BSN  Phone # [phone]  Fax # [phone]    Date of Birth Social Security Number Address Home Phone MRN    1950  [address] [place] [address] [phone] [id number]    [employer] Marital Status          None        Admission Date Admission Type Admitting Provider Attending Provider Department, Room/Bed    5/19/18 Emergency Gerardo Gutierrez MD  [facility] 6B, N637/1    Discharge Date Discharge Disposition Discharge Destination        5/22/2018 Home or Self Care              Attending Provider:  (none)   Allergies:  Statins    Isolation:  None   Infection:  None   Code Status:  Prior    Ht:  175.3 cm (69\")   Wt:  77.7 kg (171 lb 5 oz)    Admission Cmt:  None   Principal Problem:  None                Active Insurance as of 5/19/2018     Primary Coverage     Payor Plan Insurance Group Employer/Plan Group    [facility] [employer] [facility] P[employer]Y [facility] [id number]     Payor Plan Address Payor Plan Phone Number Effective From Effective To    [address] [phone] 7/1/2011     [facility] [id number]       Subscriber Name Subscriber Birth Date Member ID       ENIO TAPIA 1950 [id number]                 Emergency Contacts      (Rel.) Home Phone Work Phone Mobile Phone    Judit Tapia (Spouse) [phone] -- --            Discharge Summary     No notes of this type exist for this encounter.        Discharge Order     Start     Ordered    05/22/18 1247  Discharge patient  Once     Expected Discharge Date:  05/22/18    Discharge Disposition:  Home or Self Care    Physician of Record:  GERARDO GUTIERREZ [[id number]]    Physician of Record selection review needed?:  No       Question Answer Comment   Physician of Record GERARDO GUTIERREZ    Physician of Record selection review needed? No        05/22/18 1249          "

## 2018-05-25 ENCOUNTER — APPOINTMENT (OUTPATIENT)
Dept: LAB | Facility: HOSPITAL | Age: 68
End: 2018-05-25

## 2018-05-25 ENCOUNTER — OFFICE VISIT (OUTPATIENT)
Dept: CARDIOLOGY | Facility: CLINIC | Age: 68
End: 2018-05-25

## 2018-05-25 VITALS
WEIGHT: 172 LBS | SYSTOLIC BLOOD PRESSURE: 120 MMHG | DIASTOLIC BLOOD PRESSURE: 62 MMHG | HEIGHT: 69 IN | BODY MASS INDEX: 25.48 KG/M2

## 2018-05-25 DIAGNOSIS — S20.211A HEMATOMA OF RIGHT CHEST WALL, INITIAL ENCOUNTER: ICD-10-CM

## 2018-05-25 DIAGNOSIS — I49.3 FREQUENT UNIFOCAL PVCS: ICD-10-CM

## 2018-05-25 DIAGNOSIS — I25.110 CORONARY ARTERY DISEASE INVOLVING NATIVE CORONARY ARTERY OF NATIVE HEART WITH UNSTABLE ANGINA PECTORIS (HCC): Primary | ICD-10-CM

## 2018-05-25 DIAGNOSIS — I10 ESSENTIAL HYPERTENSION: ICD-10-CM

## 2018-05-25 DIAGNOSIS — E78.2 MIXED HYPERLIPIDEMIA: ICD-10-CM

## 2018-05-25 LAB
ARTICHOKE IGE QN: 120 MG/DL (ref 0–130)
CHOLEST SERPL-MCNC: 172 MG/DL (ref 0–200)
HDLC SERPL-MCNC: 53 MG/DL (ref 40–60)
TRIGL SERPL-MCNC: 89 MG/DL (ref 0–150)

## 2018-05-25 PROCEDURE — 80061 LIPID PANEL: CPT | Performed by: PHYSICIAN ASSISTANT

## 2018-05-25 PROCEDURE — 36415 COLL VENOUS BLD VENIPUNCTURE: CPT | Performed by: INTERNAL MEDICINE

## 2018-05-25 PROCEDURE — 93000 ELECTROCARDIOGRAM COMPLETE: CPT | Performed by: PHYSICIAN ASSISTANT

## 2018-05-25 PROCEDURE — 99214 OFFICE O/P EST MOD 30 MIN: CPT | Performed by: PHYSICIAN ASSISTANT

## 2018-05-25 RX ORDER — PRAVASTATIN SODIUM 40 MG
80 TABLET ORAL DAILY
Qty: 90 TABLET | Refills: 3 | Status: SHIPPED | OUTPATIENT
Start: 2018-05-25 | End: 2019-01-25

## 2018-05-25 NOTE — PROGRESS NOTES
Encounter Date:05/25/2018      Patient ID: Enio Tapia is a 68 y.o. male.    Chief Complaint: Coronary Artery Disease; Hypertension; and Hyperlipidemia      PROBLEM LIST:  1. Coronary artery disease  a. As/P3 0.5 x 32 Taxus JOHANA mid RCA lesion 10/15/04  b. Normal stress echo Feb 2010  c. Echo February 2010 showed normal EF of 60% with trace TR and MRAna mary. C 3/31/17For unstable angina: Diffuse 70% stenosis of mid LAD noted.  Stented with Xience 3.0 x 33 JOHANA reducing stenosis to 0%.  Also 70% discrete stenosis in proximal first diagonal stented with Xience Alpine 2.25 x 15 JOHANA reducing stenosis to 0%.  Previous stenting proximal to mid RCA widely patent.  Normal LVEF.  e. Echo 3/31/17: EF 55%.  Mild LVH noted.  2. Hypertension  3. Hyperlipidemia  4. Hypothyroidism  5. Anxiety disorder  6. Glaucoma  7. Osteoarthritis  8. GRABIEL with CPAP compliance.    History of Present Illness  Patient presents today for follow-up with a history of CAD and cardiac risk factors. His last visit in November 2015 note is reviewed at which time he had discontinued Lipitor due to myalgias.  He was started on Pravachol 40 mg daily and advised to take CoQ10.  He has not had a follow-up lipid panel.  He was admitted to [facility] last week with a horse bite to the right upper chest with a very large hematoma and extensive ecchymosis across the upper chest.  He has no exertional chest pain or dyspnea, and orthopnea, no PND no claudication.  He denies dizziness or syncope.  He has no lightheadedness with changes of position no complaint of profound fatigue.  His Plavix is currently on hold due to the large hematoma on the right anterior chest.  He is compliant with his medications reports no significant side effects.  He does not check his blood pressure regularly at home.  Review of his labs from last week's admission show moderate anemia, his CMP shows normal hepatic function and normal electrolytes.   His heart rate on May  22 , the day of discharge from hospital showed pulse of 63 with a range of 60-79 bpm.          Allergies   Allergen Reactions   • Statins Myalgia          Current Outpatient Prescriptions:   •  acyclovir (ZOVIRAX) 400 MG tablet, TAKE ONE TABLET BY MOUTH DAILY, Disp: 30 tablet, Rfl: 3  •  amLODIPine (NORVASC) 5 MG tablet, Take 1 tablet by mouth Daily., Disp: 30 tablet, Rfl: 11  •  aspirin 81 MG EC tablet, Take 81 mg by mouth Daily., Disp: , Rfl:   •  carvedilol (COREG) 3.125 MG tablet, TAKE ONE TABLET BY MOUTH TWICE A DAY WITH MEALS, Disp: 60 tablet, Rfl: 5  •  cephalexin (KEFLEX) 500 MG capsule, Take 1 capsule by mouth Every 12 (Twelve) Hours for 20 doses., Disp: 20 capsule, Rfl: 0  •  HYDROcodone-acetaminophen (NORCO) 7.5-325 MG per tablet, Take 1 tablet by mouth Every 6 (Six) Hours As Needed for Moderate Pain  for up to 7 days., Disp: 30 tablet, Rfl: 0  •  isosorbide mononitrate (IMDUR) 60 MG 24 hr tablet, Take 1 tablet by mouth Daily., Disp: 30 tablet, Rfl: 11  •  levothyroxine (SYNTHROID, LEVOTHROID) 150 MCG tablet, Take 1 tablet by mouth Daily., Disp: 90 tablet, Rfl: 2  •  losartan (COZAAR) 50 MG tablet, Take 1 tablet by mouth Daily., Disp: 90 tablet, Rfl: 2  •  pravastatin (PRAVACHOL) 40 MG tablet, Take 1 tablet by mouth Daily., Disp: 30 tablet, Rfl: 11  •  sertraline (ZOLOFT) 100 MG tablet, TAKE ONE TABLET BY MOUTH DAILY **MUST CALL MD FOR APPOINTMENT, Disp: 30 tablet, Rfl: 0  •  silver sulfadiazine (SILVADENE, SSD) 1 % cream, Apply  topically Daily., Disp: 20 g, Rfl: 0  •  travoprost, BAK free, (TRAVATAN) 0.004 % solution ophthalmic solution, 1 drop every evening. in affected eye(s), Disp: , Rfl:   •  clopidogrel (PLAVIX) 75 MG tablet, Take 1 tablet by mouth Daily., Disp: 30 tablet, Rfl: 11    The following portions of the patient's history were reviewed and updated as appropriate: allergies, current medications, past family history, past medical history, past social history, past surgical history and  "problem list.    ROS  Review of Systems   Constitution: Negative for chills, fever, weight gain and weight loss.   Cardiovascular: Negative for chest pain, claudication, dyspnea on exertion, leg swelling, orthopnea, palpitations, paroxysmal nocturnal dyspnea and syncope.        No dizziness   Gastrointestinal: Negative for abdominal pain, constipation, diarrhea, nausea and vomiting.   Genitourinary:        No urinary symptoms   Neurological:        No symptoms of stroke.   All other systems reviewed and are negative.    Objective:     Blood pressure 120/62, height 175.3 cm (69\"), weight 78 kg (172 lb).        Physical Exam  Constitutional: She appears well-developed and well-nourished.   HENT:   HEENT exam unremarkable.   Neck: Neck supple. No JVD present.   No carotid bruits.   Cardiovascular: Normal rate, regular rhythm and normal heart sounds.    No murmur heard.  2 plus symmetric pulses.   Pulmonary/Chest: Breath sounds normal. Does not exhibit tenderness.   Abdominal:   Abdomen benign.   Musculoskeletal: Does not exhibit edema.   Neurological:   Neurological exam unremarkable.   Vitals reviewed.    Lab Review:    Ref Range & Units 7mo ago   Total Cholesterol 0 - 200 mg/dL 208     Triglycerides 0 - 150 mg/dL 96    HDL Cholesterol 40 - 60 mg/dL 63     LDL Cholesterol  0 - 130 mg/dL 147          Hemoglobin & Hematocrit, Blood   Order: [id number]   Status:  Final result   Visible to patient:  No (Not Released)    Ref Range & Units 3d ago   Hemoglobin 13.1 - 17.5 g/dL 8.7     Hematocrit 38.9 - 50.9 % 25.8     Resulting Agency  [facility]              Comprehensive Metabolic Panel   Order: [id number]   Status:  Final result   Visible to patient:  No (Not Released)    Ref Range & Units 5d ago   Glucose 70 - 100 mg/dL 149     BUN 9 - 23 mg/dL 20    Creatinine 0.60 - 1.30 mg/dL 0.90    Sodium 132 - 146 mmol/L 136    Potassium 3.5 - 5.5 mmol/L 4.2    Chloride 99 - 109 mmol/L 107    CO2 20.0 - 31.0 mmol/L 23.0    Calcium " 8.7 - 10.4 mg/dL 8.2     Total Protein 5.7 - 8.2 g/dL 5.7    Albumin 3.20 - 4.80 g/dL 3.40    ALT (SGPT) 7 - 40 U/L 34    AST (SGOT) 0 - 33 U/L 22    Alkaline Phosphatase 25 - 100 U/L 44    Total Bilirubin 0.3 - 1.2 mg/dL 0.4    eGFR Non African Amer >60 mL/min/1.73 84    Globulin gm/dL 2.3    A/G Ratio 1.5 - 2.5 g/dL 1.5    BUN/Creatinine Ratio 7.0 - 25.0 22.2    Anion Gap 3.0 - 11.0 mmol/L 6.0    Resulting Agency  [facility]               ECG 12 Lead  Date/Time: 5/25/2018 12:09 PM  Performed by: ANTOINETTE SNELL  Authorized by: ANTOINETTE SNELL   Rhythm: sinus rhythm  Ectopy: bigeminy  Rate: normal  Conduction: conduction normal  ST Segments: ST segments normal  T Waves: T waves normal  QRS axis: normal  Other: no other findings  Clinical impression: abnormal ECG               Assessment:      Diagnosis Plan   1. Coronary artery disease involving native coronary artery of native heart with unstable angina pectoris  Stable    2. Mixed hyperlipidemia  Lipid Panel   3. Essential hypertension  Well controlled current medical regimen    4. Frequent unifocal PVCs, Bigeminy  Holter Monitor - 24 Hour to assess PVC burden.     5. Hematoma of right chest wall, initial encounter  Stable      Plan:   24-hour Holter.  Continue current medications.   FU in1 MO, sooner as needed.  Thank you for allowing us to participate in the care of your patient.     FRANCO Taylor  05/25/2018  12:23 PM    I have seen and examined the patient, case was discussed with the physician extender, reviewed the above note, necessary changes were made and I agree with the final note.   Candy Ribeiro MD, [facility], [facility]          Please note that portions of this note may have been completed with a voice recognition program. Efforts were made to edit the dictations, but occasionally words are mistranscribed.

## 2018-05-25 NOTE — DISCHARGE SUMMARY
CTS Discharge Summary    Patient Care Team:  Troy Mckay MD as PCP - General (Family Medicine)  Troy Mckay MD as PCP - Family Medicine  Consults:   Consults     No orders found from 4/20/2018 to 5/20/2018.          Date of Admission: 5/19/2018  2:09 PM  Date of Discharge:  5/22/2018    Discharge Diagnosis  Past Medical History:   Diagnosis Date   • Arthritis    • Coronary artery disease involving native coronary artery of native heart with unstable angina pectoris 3/31/2017   • Disease of thyroid gland    • Hematoma    • Hyperlipidemia    • Hypertension    • Mental disorder      Hematoma of right chest wall, initial encounter [S20.211A]  Hematoma of right chest wall, initial encounter [S20.211A]     Procedures Performed  CT CHEST 5/19/2018     IMPRESSION:   1. Large right anterior chest wall hematoma in the subcutaneous fat, overlying   the right pectoralis major muscle. There are a few small foci of contrast   extravasation into the hematoma (active bleeding), as above. The posterior   margin of the hematoma is either within or abuts and deforms the right   pectoralis major muscle. Asymmetric enlargement/sizeable intramuscular hematoma   of the right sternocleidomastoid.  2. There is also hematoma extending midline/bilateral ventral paramedian   overlying the sternum. No sternal fracture detected. No retrosternal hematoma.  3. No intrathoracic acute traumatic abnormality is identified.  4. Calcific coronary artery disease.  5. This exam excludes pulmonary embolism through the segmental branch arteries.         History of Present Illness  68-year-old  male sustained trauma to the right anterior chest wall in an altercation with a horse (animal bite).  The patient now has a large hematoma of the anterior chest wall.  He complains of mild soreness in this area.  He has full normal use of his right arm, no evidence of broken bones, no current respiratory distress or impending airway disruption.   This gentleman is otherwise healthy.  He had a stent placed in March 2017 and is currently on Plavix ( [id number]).  CT scan reviewed.  No evidence of intrathoracic trauma.  Hematoma is large but seems to be confined to the anterior chest wall and to the right side of the neck.       Hospital Course  Admitted on 05/19/2018.  Patient had a CT scan of his chest with results noted above.  He was started on IV empiric antibiotics.  His Plavix was temporarily held.  A pressure bandage was applied to the hematoma.  He was monitored daily with wound checks and blood counts.  He was receiving local wound care with Silvadene cream.  His H&H was noted to be low however over his course of stay it did stabilize.  Physical exam revealed a spreading ecchymosis along his chest.  This to stabilized.  He was seen on daily rounds.  On 05/22/2018 it was felt that he could be discharged home.  He was instructed to restart his Plavix until readdressed by his cardiologist in follow-up.  He was given oral antibiotic prescription as well as a prescription for Silvadene cream and wound care instructions.  Stress medications noted below.      Discharge Medications   Enio Tapia   Home Medication Instructions WILVER:[id number]    Printed on:05/25/18 5587   Medication Information                      acyclovir (ZOVIRAX) 400 MG tablet  TAKE ONE TABLET BY MOUTH DAILY             amLODIPine (NORVASC) 5 MG tablet  Take 1 tablet by mouth Daily.             aspirin 81 MG EC tablet  Take 81 mg by mouth Daily.             carvedilol (COREG) 3.125 MG tablet  TAKE ONE TABLET BY MOUTH TWICE A DAY WITH MEALS             cephalexin (KEFLEX) 500 MG capsule  Take 1 capsule by mouth Every 12 (Twelve) Hours for 20 doses.             clopidogrel (PLAVIX) 75 MG tablet  Take 1 tablet by mouth Daily.             HYDROcodone-acetaminophen (NORCO) 7.5-325 MG per tablet  Take 1 tablet by mouth Every 6 (Six) Hours As Needed for Moderate Pain  for up to 7 days.              isosorbide mononitrate (IMDUR) 60 MG 24 hr tablet  Take 1 tablet by mouth Daily.             levothyroxine (SYNTHROID, LEVOTHROID) 150 MCG tablet  Take 1 tablet by mouth Daily.             losartan (COZAAR) 50 MG tablet  Take 1 tablet by mouth Daily.             pravastatin (PRAVACHOL) 40 MG tablet  Take 1 tablet by mouth Daily.             sertraline (ZOLOFT) 100 MG tablet  TAKE ONE TABLET BY MOUTH DAILY **MUST CALL MD FOR APPOINTMENT             silver sulfadiazine (SILVADENE, SSD) 1 % cream  Apply  topically Daily.             travoprost, PAUL free, (TRAVATAN) 0.004 % solution ophthalmic solution  1 drop every evening. in affected eye(s)                 Discharge Diet:   Diet Instructions     Diet: Regular, Cardiac       Discharge Diet:   Regular  Cardiac             Activity at Discharge: As tolerated     Follow-up Appointments  Future Appointments  Date Time Provider Department Center   5/31/2018 10:15 AM Troy Mckay MD [facility] None   6/22/2018 11:15 AM Candy Ribeiro MD [facility] None        WOUND CARE:Shower and clean area dailly. Apply Silvadene cream daily as directed and cover with bandage. Call Dr Gutierrez' office if worsens or does not improve.    FRANCO Cortes  05/25/18  1:37 PM

## 2018-05-29 RX ORDER — SERTRALINE HYDROCHLORIDE 100 MG/1
TABLET, FILM COATED ORAL
Qty: 30 TABLET | Refills: 0 | Status: SHIPPED | OUTPATIENT
Start: 2018-05-29 | End: 2018-06-29 | Stop reason: SDUPTHER

## 2018-06-01 ENCOUNTER — TELEPHONE (OUTPATIENT)
Dept: CARDIOLOGY | Facility: CLINIC | Age: 68
End: 2018-06-01

## 2018-06-01 DIAGNOSIS — R94.31 HOLTER MONITOR, ABNORMAL: Primary | ICD-10-CM

## 2018-06-01 NOTE — TELEPHONE ENCOUNTER
Per Dr. Ribeiro - holter report shows 25% PVC burden.  Refer patient to EP for treatment options.    Patient agrees.

## 2018-06-06 NOTE — PROGRESS NOTES
Enio Tapia  1950  PCP: Troy Mckay MD    SUBJECTIVE:   Enio Tapia is a 68 y.o. male seen for a consultation visit regarding the following:     Chief Complaint:   Chief Complaint   Patient presents with   • Coronary Artery Disease          Consultation is requested by Candy Ribeiro MD for evaluation of Coronary Artery Disease        History:  This is a 68-year-old patient followed by Dr. Ribeiro.  He has a history of extensive coronary artery disease.  He most recently has found to have frequent ventricular ectopy for which he has been having increased shortness of breath and fatigue as a result. He was bite by a horse on May 19th, 2018. There was severe chest trauma from the bite. He noted that palp/pvcs, after have the horse bite trauma.  He's had extensive chest trauma that is also adding to his issues.        Cardiac PMH: (Old records have been reviewed and summarized below)  1. Coronary artery disease  a. As/P3 0.5 x 32 Taxus JOHANA mid RCA lesion 10/15/04  b. Normal stress echo Feb 2010  c. Echo February 2010 showed normal EF of 60% with trace TR and MR. mary. C 3/31/17For unstable angina: Diffuse 70% stenosis of mid LAD noted.  Stented with Xience 3.0 x 33 JOHANA reducing stenosis to 0%.  Also 70% discrete stenosis in proximal first diagonal stented with Xience Alpine 2.25 x 15 JOHANA reducing stenosis to 0%.  Previous stenting proximal to mid RCA widely patent.  Normal LVEF.  e. Echo 3/31/17: EF 55%.  Mild LVH noted.  2. Hypertension  3. Hyperlipidemia  4. Hypothyroidism  5. Anxiety disorder  6. Glaucoma  7. Osteoarthritis  8. GRABIEL with CPAP compliance.  9. 24 Hr Holter - May 2018 - Min 61, Mean 70, Max 108 - 25,091 PVCs - Mostly [place]      Past Medical History, Past Surgical History, Family history, Social History, and Medications were all reviewed with the patient today and updated as necessary.     Current Outpatient Prescriptions   Medication Sig Dispense Refill   • acyclovir (ZOVIRAX) 400 MG  tablet TAKE ONE TABLET BY MOUTH DAILY 30 tablet 3   • amLODIPine (NORVASC) 5 MG tablet Take 1 tablet by mouth Daily. (Patient taking differently: Take 5 mg by mouth Daily.) 30 tablet 11   • aspirin 81 MG EC tablet Take 81 mg by mouth Daily.     • carvedilol (COREG) 3.125 MG tablet TAKE ONE TABLET BY MOUTH TWICE A DAY WITH MEALS 60 tablet 5   • clopidogrel (PLAVIX) 75 MG tablet Take 1 tablet by mouth Daily. 30 tablet 11   • HYDROcodone-acetaminophen (NORCO) 7.5-325 MG per tablet Take 1 tablet by mouth Every 6 (Six) Hours As Needed for Moderate Pain .     • isosorbide mononitrate (IMDUR) 60 MG 24 hr tablet Take 1 tablet by mouth Daily. (Patient taking differently: Take 60 mg by mouth Daily.) 30 tablet 11   • levothyroxine (SYNTHROID, LEVOTHROID) 150 MCG tablet Take 1 tablet by mouth Daily. 90 tablet 2   • losartan (COZAAR) 50 MG tablet Take 1 tablet by mouth Daily. 90 tablet 2   • Multiple Vitamins-Minerals (CENTRUM SILVER PO) Take 1 tablet by mouth Daily.     • pravastatin (PRAVACHOL) 40 MG tablet Take 2 tablets by mouth Daily. (Patient taking differently: Take 80 mg by mouth Every Night.) 90 tablet 3   • sertraline (ZOLOFT) 100 MG tablet TAKE ONE TABLET BY MOUTH DAILY *MUST MAKE APPOINTMENT FOR FURTHER REFILLS 30 tablet 0   • travoprost, BAK free, (TRAVATAN) 0.004 % solution ophthalmic solution 1 drop every evening. in affected eye(s)       No current facility-administered medications for this visit.      Allergies   Allergen Reactions   • Statins Myalgia         Past Medical History:   Diagnosis Date   • Arthritis    • Coronary artery disease involving native coronary artery of native heart with unstable angina pectoris 3/31/2017   • Depression    • Disease of thyroid gland    • Enlarged prostate    • Frequent PVCs    • Glaucoma    • Hematoma     right chest   • Hyperlipidemia    • Hypertension    • GRABIEL on CPAP      Past Surgical History:   Procedure Laterality Date   • CARDIAC CATHETERIZATION     • CARDIAC  "CATHETERIZATION N/A 3/31/2017    Procedure: Left Heart Cath;  Surgeon: Enio Benavidez MD;  Location: [facility] CATH INVASIVE LOCATION;  Service:    • CATARACT EXTRACTION Bilateral    • COLONOSCOPY     • CORONARY STENT PLACEMENT  10/16/2004, 2017   • TONSILLECTOMY     • VASECTOMY  08/1998     Family History   Problem Relation Age of Onset   • Heart disease Mother    • Heart failure Mother    • Pneumonia Father      Social History   Substance Use Topics   • Smoking status: Never Smoker   • Smokeless tobacco: Former User     Types: Chew, Snuff     Quit date: 01/2002   • Alcohol use 7.2 oz/week     12 Cans of beer per week      Comment: occas       ROS:  Review of Symptoms:  General: no recent weight loss/gain, weakness or fatigue  Skin: no rashes, lumps, or other skin changes  HEENT: no dizziness, lightheadedness, or vision changes  Respiratory: no cough or hemoptysis  Cardiovascular: + palpitations, and tachycardia  Gastrointestinal: no black/tarry stools or diarrhea  Urinary: no change in frequency or urgency  Peripheral Vascular: no claudication or leg cramps  Musculoskeletal: no muscle or joint pain/stiffness  Psychiatric: no depression or excessive stress  Neurological: no sensory or motor loss, no syncope  Hematologic: no anemia, easy bruising or bleeding  Endocrine: no thyroid problems, nor heat or cold intolerance       PHYSICAL EXAM:   /66 (BP Location: Right arm, Patient Position: Sitting)   Ht 175.3 cm (69\")   Wt 77.7 kg (171 lb 6.4 oz)   BMI 25.31 kg/m²      Wt Readings from Last 5 Encounters:   06/07/18 77.7 kg (171 lb 6.4 oz)   06/07/18 79.4 kg (175 lb)   05/25/18 78 kg (172 lb)   05/19/18 77.7 kg (171 lb 5 oz)   11/17/17 79.8 kg (176 lb)     BP Readings from Last 5 Encounters:   06/07/18 124/66   06/07/18 111/75   05/25/18 120/62   05/22/18 109/61   11/17/17 130/88       General-Well Nourished, Well developed  Eyes - PERRLA  Neck- supple, No mass  CV- regular rate and rhythm, no MRG  Lung- clear " bilaterally  Abd- soft, +BS  Musc/skel - Norm strength and range of motion  Skin- warm and dry  Neuro - Alert & Oriented x 3, appropriate mood.    Medical problems and test results were reviewed with the patient today.     Results for orders placed or performed in visit on 06/07/18   Basic Metabolic Panel   Result Value Ref Range    Glucose 96 70 - 100 mg/dL    BUN 19 9 - 23 mg/dL    Creatinine 0.89 0.60 - 1.30 mg/dL    Sodium 139 132 - 146 mmol/L    Potassium 4.8 3.5 - 5.5 mmol/L    Chloride 106 99 - 109 mmol/L    CO2 29.0 20.0 - 31.0 mmol/L    Calcium 9.0 8.7 - 10.4 mg/dL    eGFR Non African Amer 85 >60 mL/min/1.73    BUN/Creatinine Ratio 21.3 7.0 - 25.0    Anion Gap 4.0 3.0 - 11.0 mmol/L   Hepatic Function Panel   Result Value Ref Range    Total Protein 6.6 5.7 - 8.2 g/dL    Albumin 4.14 3.20 - 4.80 g/dL    ALT (SGPT) 21 7 - 40 U/L    AST (SGOT) 22 0 - 33 U/L    Alkaline Phosphatase 81 25 - 100 U/L    Total Bilirubin 0.8 0.3 - 1.2 mg/dL    Bilirubin, Direct 0.2 0.0 - 0.2 mg/dL    Bilirubin, Indirect 0.6 0.1 - 1.1 mg/dL   Hemoglobin A1c   Result Value Ref Range    Hemoglobin A1C 4.70 (L) 4.80 - 5.60 %   Protime-INR   Result Value Ref Range    Protime 11.4 9.6 - 11.5 Seconds    INR 1.09 0.91 - 1.09   CBC Auto Differential   Result Value Ref Range    WBC 4.14 3.50 - 10.80 10*3/mm3    RBC 3.89 (L) 4.20 - 5.76 10*6/mm3    Hemoglobin 12.4 (L) 13.1 - 17.5 g/dL    Hematocrit 37.2 (L) 38.9 - 50.9 %    MCV 95.6 80.0 - 99.0 fL    MCH 31.9 (H) 27.0 - 31.0 pg    MCHC 33.3 32.0 - 36.0 g/dL    RDW 14.5 11.3 - 14.5 %    RDW-SD 50.7 37.0 - 54.0 fl    MPV 9.0 6.0 - 12.0 fL    Platelets 203 150 - 450 10*3/mm3    Neutrophil % 67.1 41.0 - 71.0 %    Lymphocyte % 22.2 (L) 24.0 - 44.0 %    Monocyte % 7.5 0.0 - 12.0 %    Eosinophil % 2.7 0.0 - 3.0 %    Basophil % 0.5 0.0 - 1.0 %    Immature Grans % 0.0 0.0 - 0.6 %    Neutrophils, Absolute 2.78 1.50 - 8.30 10*3/mm3    Lymphocytes, Absolute 0.92 0.60 - 4.80 10*3/mm3    Monocytes, Absolute  0.31 0.00 - 1.00 10*3/mm3    Eosinophils, Absolute 0.11 0.00 - 0.30 10*3/mm3    Basophils, Absolute 0.02 0.00 - 0.20 10*3/mm3    Immature Grans, Absolute 0.00 0.00 - 0.03 10*3/mm3         Lab Results   Component Value Date    CHOL 172 05/25/2018    HDL 53 05/25/2018     05/25/2018       EKG:  (EKG/Tracing has been independently visualized by me and summarized below)  NSR, RVOT PVCs    Procedures    ASSESSMENT and PLAN  1.  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  We discussed with him therapy options, because of his extensive coronary artery disease, bradycardia and lung disease antiarrhythmic options are limited.  We felt that the best option would be for catheter-based ablation.  But we'll need to evaluate after urgent surgery for his chest trauma.  2.  Bradycardia-We are hopeful that his sinus node function will improved post ablation of the ventricular ectopy  3.  Preop surgical evaluation from electrophysiology standpoint.  Patient does have stable vital signs in the bigeminy.  The ectopy is coming from the right ventricle outflow tract, therefore cares a low risk of sudden cardiac death.    Return in about 3 weeks (around 6/28/2018).            Brandon Parker M.D., F.A.C.C, F.H.R.S.  Cardiology/Electrophysiology  06/07/18  3:15 PM

## 2018-06-07 ENCOUNTER — TELEPHONE (OUTPATIENT)
Dept: CARDIAC SURGERY | Facility: CLINIC | Age: 68
End: 2018-06-07

## 2018-06-07 ENCOUNTER — HOSPITAL ENCOUNTER (OUTPATIENT)
Dept: GENERAL RADIOLOGY | Facility: HOSPITAL | Age: 68
Discharge: HOME | End: 2018-06-07
Admitting: PHYSICIAN ASSISTANT

## 2018-06-07 ENCOUNTER — OFFICE VISIT (OUTPATIENT)
Dept: CARDIAC SURGERY | Facility: CLINIC | Age: 68
End: 2018-06-07

## 2018-06-07 ENCOUNTER — APPOINTMENT (OUTPATIENT)
Dept: PREADMISSION TESTING | Facility: HOSPITAL | Age: 68
End: 2018-06-07

## 2018-06-07 ENCOUNTER — CONSULT (OUTPATIENT)
Dept: CARDIOLOGY | Facility: CLINIC | Age: 68
End: 2018-06-07

## 2018-06-07 ENCOUNTER — ANESTHESIA EVENT (OUTPATIENT)
Dept: PERIOP | Facility: HOSPITAL | Age: 68
End: 2018-06-07

## 2018-06-07 ENCOUNTER — PREP FOR SURGERY (OUTPATIENT)
Dept: OTHER | Facility: HOSPITAL | Age: 68
End: 2018-06-07

## 2018-06-07 VITALS
WEIGHT: 171.4 LBS | HEIGHT: 69 IN | DIASTOLIC BLOOD PRESSURE: 66 MMHG | SYSTOLIC BLOOD PRESSURE: 124 MMHG | BODY MASS INDEX: 25.39 KG/M2

## 2018-06-07 VITALS
WEIGHT: 175 LBS | DIASTOLIC BLOOD PRESSURE: 75 MMHG | OXYGEN SATURATION: 98 % | HEART RATE: 51 BPM | TEMPERATURE: 98.3 F | BODY MASS INDEX: 25.92 KG/M2 | SYSTOLIC BLOOD PRESSURE: 111 MMHG | HEIGHT: 69 IN

## 2018-06-07 DIAGNOSIS — I49.3 PVC (PREMATURE VENTRICULAR CONTRACTION): ICD-10-CM

## 2018-06-07 DIAGNOSIS — S20.211D HEMATOMA OF RIGHT CHEST WALL, SUBSEQUENT ENCOUNTER: Primary | ICD-10-CM

## 2018-06-07 DIAGNOSIS — S20.211A HEMATOMA OF RIGHT CHEST WALL, INITIAL ENCOUNTER: Primary | ICD-10-CM

## 2018-06-07 DIAGNOSIS — S20.211A HEMATOMA OF RIGHT CHEST WALL, INITIAL ENCOUNTER: ICD-10-CM

## 2018-06-07 DIAGNOSIS — R00.1 BRADYCARDIA: ICD-10-CM

## 2018-06-07 DIAGNOSIS — R00.2 PALPITATIONS: Primary | ICD-10-CM

## 2018-06-07 LAB
ABO GROUP BLD: NORMAL
ALBUMIN SERPL-MCNC: 4.14 G/DL (ref 3.2–4.8)
ALP SERPL-CCNC: 81 U/L (ref 25–100)
ALT SERPL W P-5'-P-CCNC: 21 U/L (ref 7–40)
ANION GAP SERPL CALCULATED.3IONS-SCNC: 4 MMOL/L (ref 3–11)
AST SERPL-CCNC: 22 U/L (ref 0–33)
BASOPHILS # BLD AUTO: 0.02 10*3/MM3 (ref 0–0.2)
BASOPHILS NFR BLD AUTO: 0.5 % (ref 0–1)
BILIRUB CONJ SERPL-MCNC: 0.2 MG/DL (ref 0–0.2)
BILIRUB INDIRECT SERPL-MCNC: 0.6 MG/DL (ref 0.1–1.1)
BILIRUB SERPL-MCNC: 0.8 MG/DL (ref 0.3–1.2)
BLD GP AB SCN SERPL QL: NEGATIVE
BUN BLD-MCNC: 19 MG/DL (ref 9–23)
BUN/CREAT SERPL: 21.3 (ref 7–25)
CALCIUM SPEC-SCNC: 9 MG/DL (ref 8.7–10.4)
CHLORIDE SERPL-SCNC: 106 MMOL/L (ref 99–109)
CO2 SERPL-SCNC: 29 MMOL/L (ref 20–31)
CREAT BLD-MCNC: 0.89 MG/DL (ref 0.6–1.3)
DEPRECATED RDW RBC AUTO: 50.7 FL (ref 37–54)
EOSINOPHIL # BLD AUTO: 0.11 10*3/MM3 (ref 0–0.3)
EOSINOPHIL NFR BLD AUTO: 2.7 % (ref 0–3)
ERYTHROCYTE [DISTWIDTH] IN BLOOD BY AUTOMATED COUNT: 14.5 % (ref 11.3–14.5)
GFR SERPL CREATININE-BSD FRML MDRD: 85 ML/MIN/1.73
GLUCOSE BLD-MCNC: 96 MG/DL (ref 70–100)
HBA1C MFR BLD: 4.7 % (ref 4.8–5.6)
HCT VFR BLD AUTO: 37.2 % (ref 38.9–50.9)
HGB BLD-MCNC: 12.4 G/DL (ref 13.1–17.5)
IMM GRANULOCYTES # BLD: 0 10*3/MM3 (ref 0–0.03)
IMM GRANULOCYTES NFR BLD: 0 % (ref 0–0.6)
INR PPP: 1.09 (ref 0.91–1.09)
LYMPHOCYTES # BLD AUTO: 0.92 10*3/MM3 (ref 0.6–4.8)
LYMPHOCYTES NFR BLD AUTO: 22.2 % (ref 24–44)
MCH RBC QN AUTO: 31.9 PG (ref 27–31)
MCHC RBC AUTO-ENTMCNC: 33.3 G/DL (ref 32–36)
MCV RBC AUTO: 95.6 FL (ref 80–99)
MONOCYTES # BLD AUTO: 0.31 10*3/MM3 (ref 0–1)
MONOCYTES NFR BLD AUTO: 7.5 % (ref 0–12)
NEUTROPHILS # BLD AUTO: 2.78 10*3/MM3 (ref 1.5–8.3)
NEUTROPHILS NFR BLD AUTO: 67.1 % (ref 41–71)
PLATELET # BLD AUTO: 203 10*3/MM3 (ref 150–450)
PMV BLD AUTO: 9 FL (ref 6–12)
POTASSIUM BLD-SCNC: 4.8 MMOL/L (ref 3.5–5.5)
PROT SERPL-MCNC: 6.6 G/DL (ref 5.7–8.2)
PROTHROMBIN TIME: 11.4 SECONDS (ref 9.6–11.5)
RBC # BLD AUTO: 3.89 10*6/MM3 (ref 4.2–5.76)
RH BLD: POSITIVE
SODIUM BLD-SCNC: 139 MMOL/L (ref 132–146)
T&S EXPIRATION DATE: NORMAL
WBC NRBC COR # BLD: 4.14 10*3/MM3 (ref 3.5–10.8)

## 2018-06-07 PROCEDURE — 86850 RBC ANTIBODY SCREEN: CPT | Performed by: PHYSICIAN ASSISTANT

## 2018-06-07 PROCEDURE — 86923 COMPATIBILITY TEST ELECTRIC: CPT

## 2018-06-07 PROCEDURE — 83036 HEMOGLOBIN GLYCOSYLATED A1C: CPT | Performed by: PHYSICIAN ASSISTANT

## 2018-06-07 PROCEDURE — 85025 COMPLETE CBC W/AUTO DIFF WBC: CPT | Performed by: PHYSICIAN ASSISTANT

## 2018-06-07 PROCEDURE — 71046 X-RAY EXAM CHEST 2 VIEWS: CPT

## 2018-06-07 PROCEDURE — 99204 OFFICE O/P NEW MOD 45 MIN: CPT | Performed by: INTERNAL MEDICINE

## 2018-06-07 PROCEDURE — 99213 OFFICE O/P EST LOW 20 MIN: CPT | Performed by: THORACIC SURGERY (CARDIOTHORACIC VASCULAR SURGERY)

## 2018-06-07 PROCEDURE — 86901 BLOOD TYPING SEROLOGIC RH(D): CPT | Performed by: PHYSICIAN ASSISTANT

## 2018-06-07 PROCEDURE — 80048 BASIC METABOLIC PNL TOTAL CA: CPT | Performed by: PHYSICIAN ASSISTANT

## 2018-06-07 PROCEDURE — 80076 HEPATIC FUNCTION PANEL: CPT | Performed by: PHYSICIAN ASSISTANT

## 2018-06-07 PROCEDURE — 93010 ELECTROCARDIOGRAM REPORT: CPT | Performed by: INTERNAL MEDICINE

## 2018-06-07 PROCEDURE — 36415 COLL VENOUS BLD VENIPUNCTURE: CPT

## 2018-06-07 PROCEDURE — 86900 BLOOD TYPING SEROLOGIC ABO: CPT | Performed by: PHYSICIAN ASSISTANT

## 2018-06-07 PROCEDURE — 93005 ELECTROCARDIOGRAM TRACING: CPT

## 2018-06-07 PROCEDURE — 85610 PROTHROMBIN TIME: CPT | Performed by: PHYSICIAN ASSISTANT

## 2018-06-07 RX ORDER — SODIUM CHLORIDE 0.9 % (FLUSH) 0.9 %
1-10 SYRINGE (ML) INJECTION AS NEEDED
Status: CANCELLED | OUTPATIENT
Start: 2018-06-07

## 2018-06-07 RX ORDER — CHLORHEXIDINE GLUCONATE 500 MG/1
1 CLOTH TOPICAL EVERY 12 HOURS PRN
Status: CANCELLED | OUTPATIENT
Start: 2018-06-07

## 2018-06-07 RX ORDER — LIDOCAINE HYDROCHLORIDE 10 MG/ML
0.5 INJECTION, SOLUTION EPIDURAL; INFILTRATION; INTRACAUDAL; PERINEURAL ONCE AS NEEDED
Status: CANCELLED | OUTPATIENT
Start: 2018-06-07

## 2018-06-07 RX ORDER — SODIUM CHLORIDE, SODIUM LACTATE, POTASSIUM CHLORIDE, CALCIUM CHLORIDE 600; 310; 30; 20 MG/100ML; MG/100ML; MG/100ML; MG/100ML
9 INJECTION, SOLUTION INTRAVENOUS CONTINUOUS
Status: CANCELLED | OUTPATIENT
Start: 2018-06-07

## 2018-06-07 RX ORDER — HYDROCODONE BITARTRATE AND ACETAMINOPHEN 7.5; 325 MG/1; MG/1
1 TABLET ORAL EVERY 6 HOURS PRN
Status: ON HOLD | COMMUNITY
End: 2018-07-09

## 2018-06-07 RX ORDER — FAMOTIDINE 20 MG/1
20 TABLET, FILM COATED ORAL ONCE
Status: CANCELLED | OUTPATIENT
Start: 2018-06-07 | End: 2018-06-07

## 2018-06-07 NOTE — DISCHARGE INSTRUCTIONS

## 2018-06-07 NOTE — PAT
BACTROBAN APPLIED TO EACH NOSTRIL DURING PAT VISIT     Patient to apply Chlorhexadine wipes  to surgical area (as instructed) the night before procedure and the AM of procedure. Wipes provided.    ra o2 98%    Pt has not had plavix since 5-19-18 d/t hematoma to chest, dr waldron aware per his note in the letters tab, pt has appt with edi lizama md with [facility] today for f/u from frequent pvs in 5-2018, ekg today NSRDelaney Rn notified of pat appt today and request for cardiac clearance letter for surgery in am. ekg from today faxed to office per April's request

## 2018-06-07 NOTE — PROGRESS NOTES
06/07/2018  Patient Information  Enio Tapia                                                                                          [address] [place] [address]   1950  'PCP/Referring Physician'  Troy Mckay MD  [phone]  No ref. provider found    Chief Complaint   Patient presents with   • Consult     hematoma of right chest wall   • Pain   CC: My chest hurts every time I removed my arm    History of Present Illness: 68-year-old  male attacked by a horse, two and one half weeks ago.  The patient had a large hematoma of the right anterior chest wall.  He was treated with antibiotics for 3 days and discharged.  Following discharge the patient noted increased swelling in the area of the right anterior chest and increased pain with movement of his right arm.  He now has a hematoma of the right pectoralis muscle.(Fragment was larger and he was discharged from the hospital.  The patient has not had fever, chills, or night sweats.      Patient Active Problem List   Diagnosis   • Hyperlipidemia   • Hypertension   • Hypothyroidism   • Irritable bowel syndrome   • Sleep apnea   • Generalized anxiety disorder   • Inguinal hernia   • Herpes simplex infection   • Coronary artery disease involving native coronary artery of native heart with unstable angina pectoris   • Hematoma of right chest wall   • Frequent unifocal PVCs     Past Medical History:   Diagnosis Date   • Arthritis    • Coronary artery disease involving native coronary artery of native heart with unstable angina pectoris 3/31/2017   • Disease of thyroid gland    • Hematoma    • Hyperlipidemia    • Hypertension    • Mental disorder      Past Surgical History:   Procedure Laterality Date   • CARDIAC CATHETERIZATION     • CARDIAC CATHETERIZATION N/A 3/31/2017    Procedure: Left Heart Cath;  Surgeon: Enio Benavidez MD;  Location: [facility] CATH INVASIVE LOCATION;  Service:    • CORONARY STENT PLACEMENT  10/16/2004   •  TONSILLECTOMY     • VASECTOMY  08/1998       Current Outpatient Prescriptions:   •  acyclovir (ZOVIRAX) 400 MG tablet, TAKE ONE TABLET BY MOUTH DAILY, Disp: 30 tablet, Rfl: 3  •  amLODIPine (NORVASC) 5 MG tablet, Take 1 tablet by mouth Daily., Disp: 30 tablet, Rfl: 11  •  aspirin 81 MG EC tablet, Take 81 mg by mouth Daily., Disp: , Rfl:   •  carvedilol (COREG) 3.125 MG tablet, TAKE ONE TABLET BY MOUTH TWICE A DAY WITH MEALS, Disp: 60 tablet, Rfl: 5  •  isosorbide mononitrate (IMDUR) 60 MG 24 hr tablet, Take 1 tablet by mouth Daily., Disp: 30 tablet, Rfl: 11  •  levothyroxine (SYNTHROID, LEVOTHROID) 150 MCG tablet, Take 1 tablet by mouth Daily., Disp: 90 tablet, Rfl: 2  •  losartan (COZAAR) 50 MG tablet, Take 1 tablet by mouth Daily., Disp: 90 tablet, Rfl: 2  •  pravastatin (PRAVACHOL) 40 MG tablet, Take 2 tablets by mouth Daily., Disp: 90 tablet, Rfl: 3  •  sertraline (ZOLOFT) 100 MG tablet, TAKE ONE TABLET BY MOUTH DAILY *MUST MAKE APPOINTMENT FOR FURTHER REFILLS, Disp: 30 tablet, Rfl: 0  •  silver sulfadiazine (SILVADENE, SSD) 1 % cream, Apply  topically Daily., Disp: 20 g, Rfl: 0  •  travoprost, PAUL free, (TRAVATAN) 0.004 % solution ophthalmic solution, 1 drop every evening. in affected eye(s), Disp: , Rfl:   •  clopidogrel (PLAVIX) 75 MG tablet, Take 1 tablet by mouth Daily., Disp: 30 tablet, Rfl: 11  Allergies   Allergen Reactions   • Statins Myalgia     Social History     Social History   • Marital status:      Spouse name: N/A   • Number of children: 0   • Years of education: N/A     Occupational History   •       Social History Main Topics   • Smoking status: Never Smoker   • Smokeless tobacco: Former User     Types: Chew, Snuff     Quit date: 01/2002   • Alcohol use 7.2 oz/week     12 Cans of beer per week      Comment: occas   • Drug use: No   • Sexual activity: Defer     Other Topics Concern   • Not on file     Social History Narrative    The patient lives with his wife in  [place].     Family History   Problem Relation Age of Onset   • Heart disease Mother    • Heart failure Mother    • Pneumonia Father      Review of Systems   Constitution: Negative for chills, fever, malaise/fatigue, night sweats and weight loss.   HENT: Negative for congestion, hearing loss, odynophagia and sore throat.    Cardiovascular: Negative for chest pain, dyspnea on exertion, leg swelling, orthopnea and palpitations.   Respiratory: Negative for cough, hemoptysis, shortness of breath and wheezing.    Endocrine: Negative for cold intolerance, heat intolerance, polydipsia, polyphagia and polyuria.   Hematologic/Lymphatic: Does not bruise/bleed easily.   Skin: Negative for itching, rash and suspicious lesions.   Musculoskeletal: Positive for back pain and stiffness. Negative for arthritis, joint pain, joint swelling and myalgias.   Gastrointestinal: Negative for abdominal pain, constipation, diarrhea, hematemesis, hematochezia, melena, nausea and vomiting.   Genitourinary: Negative for dysuria, frequency and hematuria.   Neurological: Negative for focal weakness, headaches, numbness and seizures.   Psychiatric/Behavioral: Negative for suicidal ideas.   Allergic/Immunologic: Negative for environmental allergies, HIV exposure, hives and persistent infections.   All other systems reviewed and are negative.    Vitals:    06/07/18 1154   BP: 111/75   BP Location: Right arm   Patient Position: Sitting   Pulse: 51   Temp: 98.3 °F (36.8 °C)   SpO2: 98%   Weight: 79.4 kg (175 lb)   Height: 175.3 cm (69\")      Physical Exam   Constitutional: He is oriented to person, place, and time. He appears well-developed and well-nourished. No distress.   HENT:   Head: Normocephalic.   Right Ear: External ear normal.   Left Ear: External ear normal.   Nose: Nose normal.   Eyes: Pupils are equal, round, and reactive to light.   Neck: Normal range of motion. Neck supple. No tracheal deviation present. No thyromegaly present. "   Cardiovascular: Normal rate, normal heart sounds and intact distal pulses.    No murmur heard.  Pulmonary/Chest: Effort normal and breath sounds normal. No respiratory distress. He has no wheezes. He has no rales. He exhibits no tenderness.   Abdominal: Soft. Bowel sounds are normal. He exhibits no distension and no mass. There is no tenderness.   Musculoskeletal: Normal range of motion. He exhibits no edema.   15 x 15 x 12 cm hematoma right anterior chest wall.  Large amount of ecchymosis surrounding area.  This in the area of the hematoma   Lymphadenopathy:     He has no cervical adenopathy.   Neurological: He is alert and oriented to person, place, and time. He has normal reflexes. No cranial nerve deficit.   Skin: Skin is warm and dry. No rash noted. No erythema.   Psychiatric: He has a normal mood and affect.       Labs/Imaging: None    Assessment: Enlarging hematoma right anterior chest    Plan: Schedule surgical incision and drainage of hematoma.  I have discussed the procedure in detail with the patient including risks such as stroke, heart attack, death, bleeding, infection, etc. the patient understands the proposed procedure and is agreeable.          Patient Active Problem List   Diagnosis   • Hyperlipidemia   • Hypertension   • Hypothyroidism   • Irritable bowel syndrome   • Sleep apnea   • Generalized anxiety disorder   • Inguinal hernia   • Herpes simplex infection   • Coronary artery disease involving native coronary artery of native heart with unstable angina pectoris   • Hematoma of right chest wall   • Frequent unifocal PVCs     Signed by:        Gerardo Gutierrez MD  CTSurgery  06/08/18   2:50 PM

## 2018-06-08 ENCOUNTER — APPOINTMENT (OUTPATIENT)
Dept: GENERAL RADIOLOGY | Facility: HOSPITAL | Age: 68
End: 2018-06-08

## 2018-06-08 ENCOUNTER — ANESTHESIA (OUTPATIENT)
Dept: PERIOP | Facility: HOSPITAL | Age: 68
End: 2018-06-08

## 2018-06-08 ENCOUNTER — HOSPITAL ENCOUNTER (INPATIENT)
Facility: HOSPITAL | Age: 68
LOS: 3 days | Discharge: HOME | End: 2018-06-11
Attending: THORACIC SURGERY (CARDIOTHORACIC VASCULAR SURGERY) | Admitting: THORACIC SURGERY (CARDIOTHORACIC VASCULAR SURGERY)

## 2018-06-08 DIAGNOSIS — S20.211A HEMATOMA OF RIGHT CHEST WALL, INITIAL ENCOUNTER: ICD-10-CM

## 2018-06-08 LAB
ABO GROUP BLD: NORMAL
RH BLD: POSITIVE

## 2018-06-08 PROCEDURE — 94799 UNLISTED PULMONARY SVC/PX: CPT

## 2018-06-08 PROCEDURE — 25010000002 CEFUROXIME: Performed by: THORACIC SURGERY (CARDIOTHORACIC VASCULAR SURGERY)

## 2018-06-08 PROCEDURE — 71045 X-RAY EXAM CHEST 1 VIEW: CPT

## 2018-06-08 PROCEDURE — 10140 I&D HMTMA SEROMA/FLUID COLLJ: CPT | Performed by: THORACIC SURGERY (CARDIOTHORACIC VASCULAR SURGERY)

## 2018-06-08 PROCEDURE — 87205 SMEAR GRAM STAIN: CPT | Performed by: THORACIC SURGERY (CARDIOTHORACIC VASCULAR SURGERY)

## 2018-06-08 PROCEDURE — 87116 MYCOBACTERIA CULTURE: CPT | Performed by: THORACIC SURGERY (CARDIOTHORACIC VASCULAR SURGERY)

## 2018-06-08 PROCEDURE — 87206 SMEAR FLUORESCENT/ACID STAI: CPT | Performed by: THORACIC SURGERY (CARDIOTHORACIC VASCULAR SURGERY)

## 2018-06-08 PROCEDURE — 0J9600Z DRAINAGE OF CHEST SUBCUTANEOUS TISSUE AND FASCIA WITH DRAINAGE DEVICE, OPEN APPROACH: ICD-10-PCS | Performed by: THORACIC SURGERY (CARDIOTHORACIC VASCULAR SURGERY)

## 2018-06-08 PROCEDURE — 25010000002 NEOSTIGMINE PER 0.5 MG: Performed by: NURSE ANESTHETIST, CERTIFIED REGISTERED

## 2018-06-08 PROCEDURE — 25010000002 ONDANSETRON PER 1 MG: Performed by: NURSE ANESTHETIST, CERTIFIED REGISTERED

## 2018-06-08 PROCEDURE — 99024 POSTOP FOLLOW-UP VISIT: CPT | Performed by: THORACIC SURGERY (CARDIOTHORACIC VASCULAR SURGERY)

## 2018-06-08 PROCEDURE — 86900 BLOOD TYPING SEROLOGIC ABO: CPT

## 2018-06-08 PROCEDURE — 25010000002 DEXAMETHASONE PER 1 MG: Performed by: NURSE ANESTHETIST, CERTIFIED REGISTERED

## 2018-06-08 PROCEDURE — 25010000002 PROPOFOL 10 MG/ML EMULSION: Performed by: NURSE ANESTHETIST, CERTIFIED REGISTERED

## 2018-06-08 PROCEDURE — 25010000002 FENTANYL CITRATE (PF) 100 MCG/2ML SOLUTION: Performed by: NURSE ANESTHETIST, CERTIFIED REGISTERED

## 2018-06-08 PROCEDURE — 87102 FUNGUS ISOLATION CULTURE: CPT | Performed by: THORACIC SURGERY (CARDIOTHORACIC VASCULAR SURGERY)

## 2018-06-08 PROCEDURE — 94660 CPAP INITIATION&MGMT: CPT

## 2018-06-08 PROCEDURE — 87015 SPECIMEN INFECT AGNT CONCNTJ: CPT | Performed by: THORACIC SURGERY (CARDIOTHORACIC VASCULAR SURGERY)

## 2018-06-08 PROCEDURE — 87076 CULTURE ANAEROBE IDENT EACH: CPT | Performed by: THORACIC SURGERY (CARDIOTHORACIC VASCULAR SURGERY)

## 2018-06-08 PROCEDURE — 86901 BLOOD TYPING SEROLOGIC RH(D): CPT

## 2018-06-08 PROCEDURE — 25010000002 HYDROMORPHONE PER 4 MG: Performed by: NURSE ANESTHETIST, CERTIFIED REGISTERED

## 2018-06-08 PROCEDURE — 87070 CULTURE OTHR SPECIMN AEROBIC: CPT | Performed by: THORACIC SURGERY (CARDIOTHORACIC VASCULAR SURGERY)

## 2018-06-08 PROCEDURE — 25010000002 CEFUROXIME: Performed by: PHYSICIAN ASSISTANT

## 2018-06-08 PROCEDURE — 87075 CULTR BACTERIA EXCEPT BLOOD: CPT | Performed by: THORACIC SURGERY (CARDIOTHORACIC VASCULAR SURGERY)

## 2018-06-08 RX ORDER — ONDANSETRON 2 MG/ML
4 INJECTION INTRAMUSCULAR; INTRAVENOUS EVERY 6 HOURS PRN
Status: DISCONTINUED | OUTPATIENT
Start: 2018-06-08 | End: 2018-06-11 | Stop reason: HOSPADM

## 2018-06-08 RX ORDER — CLOPIDOGREL BISULFATE 75 MG/1
75 TABLET ORAL DAILY
Status: DISCONTINUED | OUTPATIENT
Start: 2018-06-09 | End: 2018-06-09

## 2018-06-08 RX ORDER — SERTRALINE HYDROCHLORIDE 100 MG/1
100 TABLET, FILM COATED ORAL DAILY
Status: DISCONTINUED | OUTPATIENT
Start: 2018-06-08 | End: 2018-06-09

## 2018-06-08 RX ORDER — ONDANSETRON 2 MG/ML
INJECTION INTRAMUSCULAR; INTRAVENOUS AS NEEDED
Status: DISCONTINUED | OUTPATIENT
Start: 2018-06-08 | End: 2018-06-08 | Stop reason: SURG

## 2018-06-08 RX ORDER — HYDROCODONE BITARTRATE AND ACETAMINOPHEN 7.5; 325 MG/1; MG/1
1 TABLET ORAL EVERY 6 HOURS PRN
Status: DISCONTINUED | OUTPATIENT
Start: 2018-06-08 | End: 2018-06-11 | Stop reason: HOSPADM

## 2018-06-08 RX ORDER — LIDOCAINE HYDROCHLORIDE 10 MG/ML
0.5 INJECTION, SOLUTION EPIDURAL; INFILTRATION; INTRACAUDAL; PERINEURAL ONCE AS NEEDED
Status: COMPLETED | OUTPATIENT
Start: 2018-06-08 | End: 2018-06-08

## 2018-06-08 RX ORDER — GLYCOPYRROLATE 0.2 MG/ML
INJECTION INTRAMUSCULAR; INTRAVENOUS AS NEEDED
Status: DISCONTINUED | OUTPATIENT
Start: 2018-06-08 | End: 2018-06-08 | Stop reason: SURG

## 2018-06-08 RX ORDER — ATRACURIUM BESYLATE 10 MG/ML
INJECTION, SOLUTION INTRAVENOUS AS NEEDED
Status: DISCONTINUED | OUTPATIENT
Start: 2018-06-08 | End: 2018-06-08 | Stop reason: SURG

## 2018-06-08 RX ORDER — PROPOFOL 10 MG/ML
VIAL (ML) INTRAVENOUS AS NEEDED
Status: DISCONTINUED | OUTPATIENT
Start: 2018-06-08 | End: 2018-06-08 | Stop reason: SURG

## 2018-06-08 RX ORDER — LOSARTAN POTASSIUM 50 MG/1
50 TABLET ORAL DAILY
Status: DISCONTINUED | OUTPATIENT
Start: 2018-06-08 | End: 2018-06-09

## 2018-06-08 RX ORDER — HYDROMORPHONE HYDROCHLORIDE 1 MG/ML
0.5 INJECTION, SOLUTION INTRAMUSCULAR; INTRAVENOUS; SUBCUTANEOUS
Status: DISCONTINUED | OUTPATIENT
Start: 2018-06-08 | End: 2018-06-08 | Stop reason: HOSPADM

## 2018-06-08 RX ORDER — FAMOTIDINE 20 MG/1
20 TABLET, FILM COATED ORAL ONCE
Status: COMPLETED | OUTPATIENT
Start: 2018-06-08 | End: 2018-06-08

## 2018-06-08 RX ORDER — SODIUM CHLORIDE, SODIUM LACTATE, POTASSIUM CHLORIDE, CALCIUM CHLORIDE 600; 310; 30; 20 MG/100ML; MG/100ML; MG/100ML; MG/100ML
9 INJECTION, SOLUTION INTRAVENOUS CONTINUOUS
Status: DISCONTINUED | OUTPATIENT
Start: 2018-06-08 | End: 2018-06-08 | Stop reason: HOSPADM

## 2018-06-08 RX ORDER — PROMETHAZINE HYDROCHLORIDE 25 MG/ML
6.25 INJECTION, SOLUTION INTRAMUSCULAR; INTRAVENOUS ONCE AS NEEDED
Status: DISCONTINUED | OUTPATIENT
Start: 2018-06-08 | End: 2018-06-08 | Stop reason: HOSPADM

## 2018-06-08 RX ORDER — PROMETHAZINE HYDROCHLORIDE 25 MG/1
25 SUPPOSITORY RECTAL ONCE AS NEEDED
Status: DISCONTINUED | OUTPATIENT
Start: 2018-06-08 | End: 2018-06-08 | Stop reason: HOSPADM

## 2018-06-08 RX ORDER — LEVOTHYROXINE SODIUM 0.15 MG/1
150 TABLET ORAL DAILY
Status: DISCONTINUED | OUTPATIENT
Start: 2018-06-08 | End: 2018-06-09

## 2018-06-08 RX ORDER — NALOXONE HCL 0.4 MG/ML
0.4 VIAL (ML) INJECTION
Status: DISCONTINUED | OUTPATIENT
Start: 2018-06-08 | End: 2018-06-11 | Stop reason: HOSPADM

## 2018-06-08 RX ORDER — CARVEDILOL 3.12 MG/1
3.12 TABLET ORAL 2 TIMES DAILY WITH MEALS
Status: DISCONTINUED | OUTPATIENT
Start: 2018-06-08 | End: 2018-06-11 | Stop reason: HOSPADM

## 2018-06-08 RX ORDER — AMLODIPINE BESYLATE 5 MG/1
5 TABLET ORAL
Status: DISCONTINUED | OUTPATIENT
Start: 2018-06-08 | End: 2018-06-11 | Stop reason: HOSPADM

## 2018-06-08 RX ORDER — SODIUM CHLORIDE 9 MG/ML
INJECTION, SOLUTION INTRAVENOUS AS NEEDED
Status: DISCONTINUED | OUTPATIENT
Start: 2018-06-08 | End: 2018-06-08 | Stop reason: HOSPADM

## 2018-06-08 RX ORDER — MAGNESIUM HYDROXIDE 1200 MG/15ML
LIQUID ORAL AS NEEDED
Status: DISCONTINUED | OUTPATIENT
Start: 2018-06-08 | End: 2018-06-08 | Stop reason: HOSPADM

## 2018-06-08 RX ORDER — ONDANSETRON 4 MG/1
4 TABLET, FILM COATED ORAL EVERY 6 HOURS PRN
Status: DISCONTINUED | OUTPATIENT
Start: 2018-06-08 | End: 2018-06-11 | Stop reason: HOSPADM

## 2018-06-08 RX ORDER — ATORVASTATIN CALCIUM 10 MG/1
10 TABLET, FILM COATED ORAL NIGHTLY
Status: DISCONTINUED | OUTPATIENT
Start: 2018-06-08 | End: 2018-06-08 | Stop reason: SDUPTHER

## 2018-06-08 RX ORDER — FENTANYL CITRATE 50 UG/ML
50 INJECTION, SOLUTION INTRAMUSCULAR; INTRAVENOUS
Status: DISCONTINUED | OUTPATIENT
Start: 2018-06-08 | End: 2018-06-08 | Stop reason: HOSPADM

## 2018-06-08 RX ORDER — PROMETHAZINE HYDROCHLORIDE 25 MG/1
25 TABLET ORAL ONCE AS NEEDED
Status: DISCONTINUED | OUTPATIENT
Start: 2018-06-08 | End: 2018-06-08 | Stop reason: HOSPADM

## 2018-06-08 RX ORDER — ISOSORBIDE MONONITRATE 60 MG/1
60 TABLET, EXTENDED RELEASE ORAL
Status: DISCONTINUED | OUTPATIENT
Start: 2018-06-08 | End: 2018-06-11 | Stop reason: HOSPADM

## 2018-06-08 RX ORDER — DEXAMETHASONE SODIUM PHOSPHATE 4 MG/ML
INJECTION, SOLUTION INTRA-ARTICULAR; INTRALESIONAL; INTRAMUSCULAR; INTRAVENOUS; SOFT TISSUE AS NEEDED
Status: DISCONTINUED | OUTPATIENT
Start: 2018-06-08 | End: 2018-06-08 | Stop reason: SURG

## 2018-06-08 RX ORDER — PRAVASTATIN SODIUM 40 MG
80 TABLET ORAL NIGHTLY
Status: DISCONTINUED | OUTPATIENT
Start: 2018-06-08 | End: 2018-06-11 | Stop reason: HOSPADM

## 2018-06-08 RX ORDER — CHLORHEXIDINE GLUCONATE 500 MG/1
1 CLOTH TOPICAL EVERY 12 HOURS PRN
Status: DISCONTINUED | OUTPATIENT
Start: 2018-06-08 | End: 2018-06-08 | Stop reason: HOSPADM

## 2018-06-08 RX ORDER — ASPIRIN 81 MG/1
81 TABLET ORAL DAILY
Status: DISCONTINUED | OUTPATIENT
Start: 2018-06-08 | End: 2018-06-09

## 2018-06-08 RX ORDER — ONDANSETRON 2 MG/ML
4 INJECTION INTRAMUSCULAR; INTRAVENOUS ONCE AS NEEDED
Status: DISCONTINUED | OUTPATIENT
Start: 2018-06-08 | End: 2018-06-08 | Stop reason: HOSPADM

## 2018-06-08 RX ORDER — FENTANYL CITRATE 50 UG/ML
INJECTION, SOLUTION INTRAMUSCULAR; INTRAVENOUS AS NEEDED
Status: DISCONTINUED | OUTPATIENT
Start: 2018-06-08 | End: 2018-06-08 | Stop reason: SURG

## 2018-06-08 RX ORDER — ACYCLOVIR 200 MG/1
400 CAPSULE ORAL DAILY
Status: DISCONTINUED | OUTPATIENT
Start: 2018-06-09 | End: 2018-06-11 | Stop reason: HOSPADM

## 2018-06-08 RX ORDER — SODIUM CHLORIDE 0.9 % (FLUSH) 0.9 %
1-10 SYRINGE (ML) INJECTION AS NEEDED
Status: DISCONTINUED | OUTPATIENT
Start: 2018-06-08 | End: 2018-06-08 | Stop reason: HOSPADM

## 2018-06-08 RX ORDER — LIDOCAINE HYDROCHLORIDE 10 MG/ML
INJECTION, SOLUTION INFILTRATION; PERINEURAL AS NEEDED
Status: DISCONTINUED | OUTPATIENT
Start: 2018-06-08 | End: 2018-06-08 | Stop reason: SURG

## 2018-06-08 RX ORDER — SODIUM CHLORIDE 9 MG/ML
30 INJECTION, SOLUTION INTRAVENOUS CONTINUOUS
Status: DISCONTINUED | OUTPATIENT
Start: 2018-06-08 | End: 2018-06-11 | Stop reason: HOSPADM

## 2018-06-08 RX ORDER — MORPHINE SULFATE 4 MG/ML
4 INJECTION, SOLUTION INTRAMUSCULAR; INTRAVENOUS EVERY 4 HOURS PRN
Status: DISCONTINUED | OUTPATIENT
Start: 2018-06-08 | End: 2018-06-11 | Stop reason: HOSPADM

## 2018-06-08 RX ADMIN — ATORVASTATIN CALCIUM 10 MG: 10 TABLET, FILM COATED ORAL at 20:09

## 2018-06-08 RX ADMIN — MUPIROCIN 1 APPLICATION: 20 OINTMENT TOPICAL at 07:17

## 2018-06-08 RX ADMIN — PROPOFOL 150 MG: 10 INJECTION, EMULSION INTRAVENOUS at 08:32

## 2018-06-08 RX ADMIN — LOSARTAN POTASSIUM 50 MG: 50 TABLET ORAL at 13:11

## 2018-06-08 RX ADMIN — FAMOTIDINE 20 MG: 20 TABLET ORAL at 07:17

## 2018-06-08 RX ADMIN — LIDOCAINE HYDROCHLORIDE 0.3 ML: 10 INJECTION, SOLUTION EPIDURAL; INFILTRATION; INTRACAUDAL; PERINEURAL at 07:17

## 2018-06-08 RX ADMIN — FENTANYL CITRATE 50 MCG: 50 INJECTION, SOLUTION INTRAMUSCULAR; INTRAVENOUS at 09:00

## 2018-06-08 RX ADMIN — SODIUM CHLORIDE, POTASSIUM CHLORIDE, SODIUM LACTATE AND CALCIUM CHLORIDE: 600; 310; 30; 20 INJECTION, SOLUTION INTRAVENOUS at 08:27

## 2018-06-08 RX ADMIN — LIDOCAINE HYDROCHLORIDE 3 ML: 20 JELLY TOPICAL at 08:34

## 2018-06-08 RX ADMIN — FENTANYL CITRATE 50 MCG: 50 INJECTION, SOLUTION INTRAMUSCULAR; INTRAVENOUS at 08:32

## 2018-06-08 RX ADMIN — HYDROMORPHONE HYDROCHLORIDE 0.5 MG: 10 INJECTION INTRAMUSCULAR; INTRAVENOUS; SUBCUTANEOUS at 09:50

## 2018-06-08 RX ADMIN — ISOSORBIDE MONONITRATE 60 MG: 60 TABLET, EXTENDED RELEASE ORAL at 13:11

## 2018-06-08 RX ADMIN — PRAVASTATIN SODIUM 80 MG: 40 TABLET ORAL at 20:29

## 2018-06-08 RX ADMIN — ASPIRIN 81 MG: 81 TABLET, COATED ORAL at 13:12

## 2018-06-08 RX ADMIN — SODIUM CHLORIDE, POTASSIUM CHLORIDE, SODIUM LACTATE AND CALCIUM CHLORIDE 9 ML/HR: 600; 310; 30; 20 INJECTION, SOLUTION INTRAVENOUS at 07:17

## 2018-06-08 RX ADMIN — ONDANSETRON 4 MG: 2 INJECTION INTRAMUSCULAR; INTRAVENOUS at 08:58

## 2018-06-08 RX ADMIN — HYDROCODONE BITARTRATE AND ACETAMINOPHEN 1 TABLET: 7.5; 325 TABLET ORAL at 11:39

## 2018-06-08 RX ADMIN — LIDOCAINE HYDROCHLORIDE 50 MG: 10 INJECTION, SOLUTION INFILTRATION; PERINEURAL at 08:32

## 2018-06-08 RX ADMIN — EPHEDRINE SULFATE 5 MG: 50 INJECTION INTRAMUSCULAR; INTRAVENOUS; SUBCUTANEOUS at 08:43

## 2018-06-08 RX ADMIN — AMLODIPINE BESYLATE 5 MG: 5 TABLET ORAL at 13:12

## 2018-06-08 RX ADMIN — CEFUROXIME 1.5 G: 1.5 INJECTION, POWDER, FOR SOLUTION INTRAVENOUS at 23:07

## 2018-06-08 RX ADMIN — SERTRALINE HYDROCHLORIDE 100 MG: 100 TABLET ORAL at 13:12

## 2018-06-08 RX ADMIN — EPHEDRINE SULFATE 5 MG: 50 INJECTION INTRAMUSCULAR; INTRAVENOUS; SUBCUTANEOUS at 08:46

## 2018-06-08 RX ADMIN — HYDROMORPHONE HYDROCHLORIDE 0.5 MG: 10 INJECTION INTRAMUSCULAR; INTRAVENOUS; SUBCUTANEOUS at 10:20

## 2018-06-08 RX ADMIN — GLYCOPYRROLATE 0.4 MG: 0.2 INJECTION, SOLUTION INTRAMUSCULAR; INTRAVENOUS at 09:20

## 2018-06-08 RX ADMIN — ATRACURIUM BESYLATE 30 MG: 10 INJECTION, SOLUTION INTRAVENOUS at 08:32

## 2018-06-08 RX ADMIN — DEXAMETHASONE SODIUM PHOSPHATE 8 MG: 4 INJECTION, SOLUTION INTRAMUSCULAR; INTRAVENOUS at 08:38

## 2018-06-08 RX ADMIN — CEFUROXIME 1.5 G: 1.5 INJECTION, POWDER, FOR SOLUTION INTRAVENOUS at 16:59

## 2018-06-08 RX ADMIN — EPHEDRINE SULFATE 5 MG: 50 INJECTION INTRAMUSCULAR; INTRAVENOUS; SUBCUTANEOUS at 08:37

## 2018-06-08 RX ADMIN — Medication 2.5 MG: at 09:20

## 2018-06-08 RX ADMIN — SODIUM CHLORIDE 30 ML/HR: 900 INJECTION INTRAVENOUS at 13:12

## 2018-06-08 RX ADMIN — HYDROCODONE BITARTRATE AND ACETAMINOPHEN 1 TABLET: 7.5; 325 TABLET ORAL at 20:17

## 2018-06-08 RX ADMIN — EPHEDRINE SULFATE 5 MG: 50 INJECTION INTRAMUSCULAR; INTRAVENOUS; SUBCUTANEOUS at 08:55

## 2018-06-08 RX ADMIN — LEVOTHYROXINE SODIUM 150 MCG: 150 TABLET ORAL at 13:12

## 2018-06-08 RX ADMIN — CARVEDILOL 3.12 MG: 3.12 TABLET, FILM COATED ORAL at 16:59

## 2018-06-08 RX ADMIN — EPHEDRINE SULFATE 5 MG: 50 INJECTION INTRAMUSCULAR; INTRAVENOUS; SUBCUTANEOUS at 08:49

## 2018-06-08 RX ADMIN — CEFUROXIME 1.5 G: 1.5 INJECTION, POWDER, FOR SOLUTION INTRAVENOUS at 08:27

## 2018-06-08 NOTE — ANESTHESIA PROCEDURE NOTES
Airway  Urgency: elective    Airway not difficult    General Information and Staff    Patient location during procedure: OR  CRNA: NELLY VALLE    Indications and Patient Condition  Indications for airway management: airway protection    Preoxygenated: yes  MILS not maintained throughout  Mask difficulty assessment: 1 - vent by mask    Final Airway Details  Final airway type: endotracheal airway      Successful airway: ETT  Cuffed: yes   Successful intubation technique: direct laryngoscopy  Facilitating devices/methods: intubating stylet  Endotracheal tube insertion site: oral  Blade: Toby  Blade size: #3  ETT size: 7.5 mm  Cormack-Lehane Classification: grade I - full view of glottis  Placement verified by: chest auscultation and capnometry   Measured from: lips  ETT to lips (cm): 21  Number of attempts at approach: 1    Additional Comments  Negative epigastric sounds, Breath sound equal bilaterally with symmetric chest rise and fall

## 2018-06-08 NOTE — PLAN OF CARE
Problem: Surgery Nonspecified (Adult)  Goal: Signs and Symptoms of Listed Potential Problems Will be Absent, Minimized or Managed (Surgery Nonspecified)  Outcome: Ongoing (interventions implemented as appropriate)   06/08/18 1126   Goal/Outcome Evaluation   Problems Assessed (Surgery) all   Problems Present (Surgery) none

## 2018-06-08 NOTE — INTERVAL H&P NOTE
"  H&P reviewed. The patient was examined and there are no changes to the H&P.       NOTE: States incision has \"gotten tighter and more painful and is causing him to breathe more shallow.\"    Heart: irregular no MRG  Lungs: CTAB    Immunizations:    Tetanus: 5/2018     Influenza: 2017     Pneumo: UTD    Labs were reviewed.     EKG: freq PVC's bradycardia    Plan: incision and drainage hematoma right trunk, thoracotomy incision right    Above H&P reviewed.   I have reviewed, verified, and confirmed the above history and current status.  I have examined the patient and confirmed the above physical findings.    Gerardo Gutierrez MD  CTSurgery  06/08/18   7:35 AM    "

## 2018-06-08 NOTE — ANESTHESIA PREPROCEDURE EVALUATION
Anesthesia Evaluation     Patient summary reviewed and Nursing notes reviewed                Airway   Mallampati: II  TM distance: >3 FB  Neck ROM: full  no difficulty expected  Dental - normal exam     Pulmonary - normal exam   (+) sleep apnea on CPAP,   Cardiovascular - normal exam    (+) hypertension, CAD, cardiac stents (3/17) Drug eluting stent more than 12 months ago hyperlipidemia,     ROS comment: Normal EF from echo 3/17    Neuro/Psych- negative ROS  GI/Hepatic/Renal/Endo    (+)   hypothyroidism,     Musculoskeletal     Abdominal  - normal exam    Bowel sounds: normal.   Substance History - negative use     OB/GYN negative ob/gyn ROS         Other   (+) arthritis       Other Comment: Large right chest hematoma                  Anesthesia Plan    ASA 3     intravenous induction     Plan discussed with CRNA.

## 2018-06-08 NOTE — PROGRESS NOTES
This man has had a stable postoperative course.  Suction on his LUCRECIA drains is successfully keeping the flap down.  The drains need to be kept to continuous suction interruption.  The patient may get out of bed to a chair but cannot ambulate in the halls because dissection needs to be continuous.  Drains will be left in place at least until Monday or Tuesday.   I have reviewed, verified, and confirmed the above history and current status.  I have examined the patient and confirmed the above physical findings.    Gerardo Gutierrez MD  CTSurgery  06/08/18   2:55 PM

## 2018-06-08 NOTE — ANESTHESIA POSTPROCEDURE EVALUATION
Patient: Enio Tapia    Procedure Summary     Date:  06/08/18 Room / Location:   [facility]  [facility]    Anesthesia Start:  0827 Anesthesia Stop:  0936    Procedure:  INCISION AND DRAINAGE RIGHT TRUNK HEMATOMA (Right Chest) Diagnosis:       Hematoma of right chest wall, initial encounter      (Hematoma of right chest wall, initial encounter [S20.211A])    Surgeon:  Gerardo Gutierrez MD Provider:  Denny Olivares MD    Anesthesia Type:  Not recorded ASA Status:  3          Anesthesia Type: No value filed.  Last vitals  BP   157/80 (06/08/18 0935)   Temp   97.9 °F (36.6 °C) (06/08/18 0935)   Pulse   63 (06/08/18 0935)   Resp   18 (06/08/18 0935)     SpO2   99 % (06/08/18 0935)     Post Anesthesia Care and Evaluation    Patient location during evaluation: PACU  Patient participation: complete - patient participated  Level of consciousness: awake and alert  Pain score: 0  Pain management: adequate  Airway patency: patent  Anesthetic complications: No anesthetic complications  PONV Status: none  Cardiovascular status: hemodynamically stable and acceptable  Respiratory status: nonlabored ventilation, acceptable and nasal cannula  Hydration status: acceptable

## 2018-06-08 NOTE — OP NOTE
Operative Report      Enio Tapia    : 1950  MRN: [id number]  [facility]: [id number]      Date:18      Preop Diagnosis: Large hematoma anterior chest wall (right).      Postop Diagnosis: Large hematoma anterior chest wall (right).      Procedure: Incision and drainage of large right anterior chest wall hematoma with antibiotic pulsatile irrigation and debridement      Surgeon: Gerardo Gutierrez MD      Assistant: Allie Rodriguez PA-C      Indications: 68-year-old  male working on local sounds by a horse.  The patient sustained a large anterior chest wall hematoma.  Initially this was felt to be small enough that it would resolve on its own however after a few days of the patient noted that the hematoma was enlarging in size.  He is brought to the operating room at this point in time for incision and drainage.      Operative Findings: Large amount of blood clot removed.  The clot was sent for routine pathology and for culture.  Was irrigated with 3000 cc of pulsatile saline and with antibiotic-containing saline.  The drains were placed to suction to hold the flap down.      EBL:10cc      Operative Narrative: The patient was brought to the operating room and prepared for surgical intervention in the usual fashion.  A timeout was called.  The patient's identity, appropriate side of the procedure, the procedure itself, and the availability of appropriate equipment and personnel was verified.  Patient's allergies were discussed and antibiotic administration was verified.  Following satisfactory inhalation anesthesia the chest was prepped and draped in the usual fashion.  A 3 inch incision over the hematoma was carried down through the subcutaneous tissue and the hematoma was entered.  400-500cc of clotted blood was removed from the wound.  The wound was irrigated with saline utilizing a pulsatile delivery system.  The wound was then irrigated with antibiotic solution.2 (#19) LUCRECIA drains were brought  out through separate stab wounds and connected to wall suction.  The incision was closed with 3-0 Vicryl and skin staples.  Sterile dressings were placed.  The patient was transferred to the recovery room in stable condition.        Gerardo Gutierrez MD  CTSurgery  06/08/18   1:04 PM

## 2018-06-09 LAB
ANION GAP SERPL CALCULATED.3IONS-SCNC: 6 MMOL/L (ref 3–11)
BASOPHILS # BLD AUTO: 0.01 10*3/MM3 (ref 0–0.2)
BASOPHILS NFR BLD AUTO: 0.2 % (ref 0–1)
BUN BLD-MCNC: 19 MG/DL (ref 9–23)
BUN/CREAT SERPL: 22.1 (ref 7–25)
CALCIUM SPEC-SCNC: 8.5 MG/DL (ref 8.7–10.4)
CHLORIDE SERPL-SCNC: 104 MMOL/L (ref 99–109)
CO2 SERPL-SCNC: 27 MMOL/L (ref 20–31)
CREAT BLD-MCNC: 0.86 MG/DL (ref 0.6–1.3)
DEPRECATED RDW RBC AUTO: 51.1 FL (ref 37–54)
EOSINOPHIL # BLD AUTO: 0.02 10*3/MM3 (ref 0–0.3)
EOSINOPHIL NFR BLD AUTO: 0.4 % (ref 0–3)
ERYTHROCYTE [DISTWIDTH] IN BLOOD BY AUTOMATED COUNT: 14.5 % (ref 11.3–14.5)
GFR SERPL CREATININE-BSD FRML MDRD: 88 ML/MIN/1.73
GLUCOSE BLD-MCNC: 110 MG/DL (ref 70–100)
HCT VFR BLD AUTO: 30.6 % (ref 38.9–50.9)
HGB BLD-MCNC: 10.4 G/DL (ref 13.1–17.5)
IMM GRANULOCYTES # BLD: 0.01 10*3/MM3 (ref 0–0.03)
IMM GRANULOCYTES NFR BLD: 0.2 % (ref 0–0.6)
LYMPHOCYTES # BLD AUTO: 0.79 10*3/MM3 (ref 0.6–4.8)
LYMPHOCYTES NFR BLD AUTO: 15.7 % (ref 24–44)
MCH RBC QN AUTO: 32.5 PG (ref 27–31)
MCHC RBC AUTO-ENTMCNC: 34 G/DL (ref 32–36)
MCV RBC AUTO: 95.6 FL (ref 80–99)
MONOCYTES # BLD AUTO: 0.33 10*3/MM3 (ref 0–1)
MONOCYTES NFR BLD AUTO: 6.6 % (ref 0–12)
NEUTROPHILS # BLD AUTO: 3.88 10*3/MM3 (ref 1.5–8.3)
NEUTROPHILS NFR BLD AUTO: 77.1 % (ref 41–71)
PLATELET # BLD AUTO: 185 10*3/MM3 (ref 150–450)
PMV BLD AUTO: 9.2 FL (ref 6–12)
POTASSIUM BLD-SCNC: 4.3 MMOL/L (ref 3.5–5.5)
RBC # BLD AUTO: 3.2 10*6/MM3 (ref 4.2–5.76)
SODIUM BLD-SCNC: 137 MMOL/L (ref 132–146)
WBC NRBC COR # BLD: 5.03 10*3/MM3 (ref 3.5–10.8)

## 2018-06-09 PROCEDURE — 99024 POSTOP FOLLOW-UP VISIT: CPT | Performed by: THORACIC SURGERY (CARDIOTHORACIC VASCULAR SURGERY)

## 2018-06-09 PROCEDURE — 99024 POSTOP FOLLOW-UP VISIT: CPT | Performed by: PHYSICIAN ASSISTANT

## 2018-06-09 PROCEDURE — 25010000002 CEFUROXIME: Performed by: THORACIC SURGERY (CARDIOTHORACIC VASCULAR SURGERY)

## 2018-06-09 PROCEDURE — 85025 COMPLETE CBC W/AUTO DIFF WBC: CPT | Performed by: THORACIC SURGERY (CARDIOTHORACIC VASCULAR SURGERY)

## 2018-06-09 PROCEDURE — 80048 BASIC METABOLIC PNL TOTAL CA: CPT | Performed by: THORACIC SURGERY (CARDIOTHORACIC VASCULAR SURGERY)

## 2018-06-09 RX ORDER — ASPIRIN 81 MG/1
81 TABLET ORAL NIGHTLY
Status: DISCONTINUED | OUTPATIENT
Start: 2018-06-09 | End: 2018-06-11 | Stop reason: HOSPADM

## 2018-06-09 RX ORDER — SERTRALINE HYDROCHLORIDE 100 MG/1
100 TABLET, FILM COATED ORAL NIGHTLY
Status: DISCONTINUED | OUTPATIENT
Start: 2018-06-09 | End: 2018-06-11 | Stop reason: HOSPADM

## 2018-06-09 RX ORDER — LEVOTHYROXINE SODIUM 0.15 MG/1
150 TABLET ORAL NIGHTLY
Status: DISCONTINUED | OUTPATIENT
Start: 2018-06-09 | End: 2018-06-11 | Stop reason: HOSPADM

## 2018-06-09 RX ORDER — LOSARTAN POTASSIUM 50 MG/1
50 TABLET ORAL NIGHTLY
Status: DISCONTINUED | OUTPATIENT
Start: 2018-06-09 | End: 2018-06-11 | Stop reason: HOSPADM

## 2018-06-09 RX ORDER — ACETAMINOPHEN 325 MG/1
650 TABLET ORAL EVERY 4 HOURS PRN
Status: DISCONTINUED | OUTPATIENT
Start: 2018-06-09 | End: 2018-06-11 | Stop reason: HOSPADM

## 2018-06-09 RX ADMIN — PRAVASTATIN SODIUM 80 MG: 40 TABLET ORAL at 20:36

## 2018-06-09 RX ADMIN — CEFUROXIME 1.5 G: 1.5 INJECTION, POWDER, FOR SOLUTION INTRAVENOUS at 08:39

## 2018-06-09 RX ADMIN — CARVEDILOL 3.12 MG: 3.12 TABLET, FILM COATED ORAL at 08:49

## 2018-06-09 RX ADMIN — AMLODIPINE BESYLATE 5 MG: 5 TABLET ORAL at 08:50

## 2018-06-09 RX ADMIN — ACETAMINOPHEN 650 MG: 325 TABLET ORAL at 15:51

## 2018-06-09 RX ADMIN — CARVEDILOL 3.12 MG: 3.12 TABLET, FILM COATED ORAL at 18:30

## 2018-06-09 RX ADMIN — ASPIRIN 81 MG: 81 TABLET, COATED ORAL at 20:36

## 2018-06-09 RX ADMIN — ACYCLOVIR 400 MG: 200 CAPSULE ORAL at 08:49

## 2018-06-09 RX ADMIN — CEFUROXIME 1.5 G: 1.5 INJECTION, POWDER, FOR SOLUTION INTRAVENOUS at 15:52

## 2018-06-09 RX ADMIN — LOSARTAN POTASSIUM 50 MG: 50 TABLET ORAL at 20:37

## 2018-06-09 RX ADMIN — CEFUROXIME 1.5 G: 1.5 INJECTION, POWDER, FOR SOLUTION INTRAVENOUS at 23:33

## 2018-06-09 RX ADMIN — HYDROCODONE BITARTRATE AND ACETAMINOPHEN 1 TABLET: 7.5; 325 TABLET ORAL at 20:41

## 2018-06-09 RX ADMIN — SERTRALINE HYDROCHLORIDE 100 MG: 100 TABLET ORAL at 20:36

## 2018-06-09 RX ADMIN — ISOSORBIDE MONONITRATE 60 MG: 60 TABLET, EXTENDED RELEASE ORAL at 08:49

## 2018-06-09 RX ADMIN — LEVOTHYROXINE SODIUM 150 MCG: 150 TABLET ORAL at 20:36

## 2018-06-09 NOTE — PLAN OF CARE
Problem: Patient Care Overview  Goal: Plan of Care Review  Outcome: Ongoing (interventions implemented as appropriate)   06/09/18 0244   Coping/Psychosocial   Plan of Care Reviewed With patient   Plan of Care Review   Progress improving   OTHER   Outcome Summary pt sleeping without apparent distress, vss

## 2018-06-09 NOTE — PLAN OF CARE
Problem: Patient Care Overview  Goal: Plan of Care Review  Outcome: Ongoing (interventions implemented as appropriate)   06/09/18 6798   Coping/Psychosocial   Plan of Care Reviewed With patient   Plan of Care Review   Progress no change

## 2018-06-09 NOTE — PROGRESS NOTES
CTS Progress Note      POD 1 s/p Incision and drainage of large right anterior chest wall hematoma with antibiotic pulsatile irrigation and debridement          LOS: 1 day   Patient Care Team:  Troy Mckay MD as PCP - General (Family Medicine)  Troy Mckay MD as PCP - Family Medicine    Subjective  Feels better, no C/O    CC: Incision and drainage of large right anterior chest wall hematoma with antibiotic pulsatile irrigation and debridement       Objective    Vital Signs  Temp:  [97.4 °F (36.3 °C)-98.4 °F (36.9 °C)] 98.1 °F (36.7 °C)  Heart Rate:  [52-86] 62  Resp:  [16-20] 16  BP: (100-170)/(53-91) 100/53  FiO2 (%):  [21 %] 21 %    Physical Exam:   General Appearance: alert, appears stated age and cooperative   Lungs: clear to auscultation, respirations regular, respirations even and respirations unlabored   Heart: regular rhythm & normal rate, normal S1, S2, no murmur, no aren, no rub and no click   Skin:  Incision c/d/i     Results     Results from last 7 days  Lab Units 06/09/18  0446   WBC 10*3/mm3 5.03   HEMOGLOBIN g/dL 10.4*   HEMATOCRIT % 30.6*   PLATELETS 10*3/mm3 185       Results from last 7 days  Lab Units 06/09/18  0446   SODIUM mmol/L 137   POTASSIUM mmol/L 4.3   CHLORIDE mmol/L 104   CO2 mmol/L 27.0   BUN mg/dL 19   CREATININE mg/dL 0.86   GLUCOSE mg/dL 110*   CALCIUM mg/dL 8.5*           Imaging Results (last 24 hours)     Procedure Component Value Units Date/Time    XR Chest 1 View [[id number]] Collected:  06/08/18 1625     Updated:  06/08/18 1643    Narrative:       EXAMINATION: XR CHEST 1 VW- 06/08/2018     INDICATION: Chest wall hematoma; S20.211A-Contusion of right front wall  of thorax, initial encounter      COMPARISON: 06/07/2018     FINDINGS: Portable chest reveals cardiac and mediastinal silhouettes to  be within normal limits. Drainage catheter seen overlying the chest wall  with surgical staples seen overlying the right upper chest wall.  Degenerative changes seen within the  spine. The lung fields are grossly  clear. Atelectatic changes seen within the left lung base.           Impression:       Surgical drainage catheter seen overlying the right upper  chest wall with haziness resolved in the interval. Surgical staples are  identified. Lung fields are grossly clear.     D:  06/08/2018  E:  06/08/2018     This report was finalized on 6/8/2018 4:41 PM by Dr. Alma Delia Escalera MD.             Assessment    Principal Problem:    Hematoma of right chest wall  drains at 90cc last 18 hours      Plan   Continue drain till Monday, then out and home    FRANCO Nickerson  06/09/18  7:10 AM

## 2018-06-10 PROCEDURE — 25010000002 CEFUROXIME: Performed by: THORACIC SURGERY (CARDIOTHORACIC VASCULAR SURGERY)

## 2018-06-10 PROCEDURE — 99024 POSTOP FOLLOW-UP VISIT: CPT | Performed by: THORACIC SURGERY (CARDIOTHORACIC VASCULAR SURGERY)

## 2018-06-10 RX ORDER — TEMAZEPAM 15 MG/1
15 CAPSULE ORAL NIGHTLY PRN
Status: DISCONTINUED | OUTPATIENT
Start: 2018-06-10 | End: 2018-06-11 | Stop reason: HOSPADM

## 2018-06-10 RX ADMIN — LEVOTHYROXINE SODIUM 150 MCG: 150 TABLET ORAL at 20:42

## 2018-06-10 RX ADMIN — CEFUROXIME 1.5 G: 1.5 INJECTION, POWDER, FOR SOLUTION INTRAVENOUS at 23:24

## 2018-06-10 RX ADMIN — SODIUM CHLORIDE 30 ML/HR: 900 INJECTION INTRAVENOUS at 15:00

## 2018-06-10 RX ADMIN — ACYCLOVIR 400 MG: 200 CAPSULE ORAL at 08:41

## 2018-06-10 RX ADMIN — HYDROCODONE BITARTRATE AND ACETAMINOPHEN 1 TABLET: 7.5; 325 TABLET ORAL at 20:42

## 2018-06-10 RX ADMIN — LOSARTAN POTASSIUM 50 MG: 50 TABLET ORAL at 20:42

## 2018-06-10 RX ADMIN — PRAVASTATIN SODIUM 80 MG: 40 TABLET ORAL at 20:42

## 2018-06-10 RX ADMIN — TEMAZEPAM 15 MG: 15 CAPSULE ORAL at 20:42

## 2018-06-10 RX ADMIN — CARVEDILOL 3.12 MG: 3.12 TABLET, FILM COATED ORAL at 17:59

## 2018-06-10 RX ADMIN — ASPIRIN 81 MG: 81 TABLET, COATED ORAL at 20:42

## 2018-06-10 RX ADMIN — CEFUROXIME 1.5 G: 1.5 INJECTION, POWDER, FOR SOLUTION INTRAVENOUS at 08:41

## 2018-06-10 RX ADMIN — AMLODIPINE BESYLATE 5 MG: 5 TABLET ORAL at 08:41

## 2018-06-10 RX ADMIN — ISOSORBIDE MONONITRATE 60 MG: 60 TABLET, EXTENDED RELEASE ORAL at 08:41

## 2018-06-10 RX ADMIN — SERTRALINE HYDROCHLORIDE 100 MG: 100 TABLET ORAL at 20:42

## 2018-06-10 RX ADMIN — CARVEDILOL 3.12 MG: 3.12 TABLET, FILM COATED ORAL at 10:23

## 2018-06-10 RX ADMIN — CEFUROXIME 1.5 G: 1.5 INJECTION, POWDER, FOR SOLUTION INTRAVENOUS at 15:00

## 2018-06-10 NOTE — PLAN OF CARE
Problem: Patient Care Overview  Goal: Plan of Care Review  Outcome: Ongoing (interventions implemented as appropriate)   06/10/18 0415   Coping/Psychosocial   Plan of Care Reviewed With patient   Plan of Care Review   Progress no change       Problem: Surgery Nonspecified (Adult)  Goal: Signs and Symptoms of Listed Potential Problems Will be Absent, Minimized or Managed (Surgery Nonspecified)  Outcome: Ongoing (interventions implemented as appropriate)

## 2018-06-10 NOTE — PROGRESS NOTES
CTS Progress Note      POD 2 s/p Incision and drainage of large right anterior chest wall hematoma with antibiotic pulsatile irrigation and debridement  Chief complaint  hematoma  Subjective  Sitting up in bed.  Conversant.  No complaint.      Objective    Physical Exam:   Vital Signs   Temp:  [98.1 °F (36.7 °C)-99.1 °F (37.3 °C)] 98.9 °F (37.2 °C)  Heart Rate:  [56-75] 56  Resp:  [16-17] 17  BP: (104-138)/(57-78) 124/78   GEN: NAD   CV: Regular without murmur    RESP: Clear to auscultation   EXT: Warm without edema   Incision: Clean dry and intact with skin staples     Results       Results from last 7 days  Lab Units 06/09/18  0446   WBC 10*3/mm3 5.03   HEMOGLOBIN g/dL 10.4*   HEMATOCRIT % 30.6*   PLATELETS 10*3/mm3 185       Results from last 7 days  Lab Units 06/09/18  0446   SODIUM mmol/L 137   POTASSIUM mmol/L 4.3   CHLORIDE mmol/L 104   CO2 mmol/L 27.0   BUN mg/dL 19   CREATININE mg/dL 0.86   GLUCOSE mg/dL 110*   CALCIUM mg/dL 8.5*       Results from last 7 days  Lab Units 06/07/18  1343   INR  1.09           Assessment/Plan   POD #2 s/p I&D chest wound/hematoma  Principal Problem:    Hematoma of right chest wall        Plan   Continue drain  ABX     FRANCO Cortes  06/10/18  6:35 AM

## 2018-06-10 NOTE — PLAN OF CARE
Problem: Patient Care Overview  Goal: Plan of Care Review  Outcome: Ongoing (interventions implemented as appropriate)   06/10/18 1400 06/10/18 8725   Coping/Psychosocial   Plan of Care Reviewed With patient;spouse --    Plan of Care Review   Progress --  improving   OTHER   Outcome Summary --  patients LUCRECIA drainage is 10mL from 06/08 1300. patient requested something for sleep tonight since patient did not rest well prior night. pt denies any pain currently. vitals stable. will continue to monitor.       Problem: Surgery Nonspecified (Adult)  Goal: Signs and Symptoms of Listed Potential Problems Will be Absent, Minimized or Managed (Surgery Nonspecified)  Outcome: Ongoing (interventions implemented as appropriate)    Goal: Anesthesia/Sedation Recovery  Outcome: Ongoing (interventions implemented as appropriate)      Problem: Infection, Risk/Actual (Adult)  Goal: Identify Related Risk Factors and Signs and Symptoms  Outcome: Ongoing (interventions implemented as appropriate)

## 2018-06-11 VITALS
RESPIRATION RATE: 20 BRPM | OXYGEN SATURATION: 94 % | SYSTOLIC BLOOD PRESSURE: 101 MMHG | WEIGHT: 173.6 LBS | HEIGHT: 69 IN | BODY MASS INDEX: 25.71 KG/M2 | HEART RATE: 67 BPM | TEMPERATURE: 98.1 F | DIASTOLIC BLOOD PRESSURE: 65 MMHG

## 2018-06-11 LAB
ABO + RH BLD: NORMAL
ABO + RH BLD: NORMAL
BH BB BLOOD EXPIRATION DATE: NORMAL
BH BB BLOOD EXPIRATION DATE: NORMAL
BH BB BLOOD TYPE BARCODE: 6200
BH BB BLOOD TYPE BARCODE: 6200
BH BB DISPENSE STATUS: NORMAL
BH BB DISPENSE STATUS: NORMAL
BH BB PRODUCT CODE: NORMAL
BH BB PRODUCT CODE: NORMAL
BH BB UNIT NUMBER: NORMAL
BH BB UNIT NUMBER: NORMAL
UNIT  ABO: NORMAL
UNIT  ABO: NORMAL
UNIT  RH: NORMAL
UNIT  RH: NORMAL

## 2018-06-11 PROCEDURE — 25010000002 CEFUROXIME: Performed by: THORACIC SURGERY (CARDIOTHORACIC VASCULAR SURGERY)

## 2018-06-11 PROCEDURE — 99024 POSTOP FOLLOW-UP VISIT: CPT | Performed by: THORACIC SURGERY (CARDIOTHORACIC VASCULAR SURGERY)

## 2018-06-11 PROCEDURE — 99024 POSTOP FOLLOW-UP VISIT: CPT | Performed by: PHYSICIAN ASSISTANT

## 2018-06-11 RX ORDER — CLOPIDOGREL BISULFATE 75 MG/1
75 TABLET ORAL DAILY
Status: DISCONTINUED | OUTPATIENT
Start: 2018-06-11 | End: 2018-06-11 | Stop reason: HOSPADM

## 2018-06-11 RX ORDER — CLOPIDOGREL BISULFATE 75 MG/1
75 TABLET ORAL DAILY
Qty: 30 TABLET | Refills: 1 | Status: SHIPPED | OUTPATIENT
Start: 2018-06-11 | End: 2018-10-19 | Stop reason: SDUPTHER

## 2018-06-11 RX ADMIN — CARVEDILOL 3.12 MG: 3.12 TABLET, FILM COATED ORAL at 08:34

## 2018-06-11 RX ADMIN — AMLODIPINE BESYLATE 5 MG: 5 TABLET ORAL at 08:33

## 2018-06-11 RX ADMIN — CLOPIDOGREL BISULFATE 75 MG: 75 TABLET ORAL at 12:38

## 2018-06-11 RX ADMIN — ISOSORBIDE MONONITRATE 60 MG: 60 TABLET, EXTENDED RELEASE ORAL at 08:34

## 2018-06-11 RX ADMIN — ACYCLOVIR 400 MG: 200 CAPSULE ORAL at 08:33

## 2018-06-11 RX ADMIN — CEFUROXIME 1.5 G: 1.5 INJECTION, POWDER, FOR SOLUTION INTRAVENOUS at 08:30

## 2018-06-11 NOTE — DISCHARGE SUMMARY
CTS Discharge Summary    Patient Care Team:  Troy Mckay MD as PCP - General (Family Medicine)  Troy Mckay MD as PCP - Family Medicine      Date of Admission: 6/8/2018  6:41 AM  Date of Discharge:  06/11/2018    Discharge Diagnosis  Past Medical History:   Diagnosis Date   • Arthritis    • Coronary artery disease involving native coronary artery of native heart with unstable angina pectoris 3/31/2017   • Depression    • Disease of thyroid gland    • Enlarged prostate    • Frequent PVCs    • Glaucoma    • Hematoma     right chest   • Hyperlipidemia    • Hypertension    • GRABIEL on CPAP        Principal Problem:    Hematoma of right chest wall      History of Present Illness68-year-old  male working on local sounds by a horse.  The patient sustained a large anterior chest wall hematoma.  Initially this was felt to be small enough that it would resolve on its own however after a few days of the patient noted that the hematoma was enlarging in size.  He is brought to the operating room at this point in time for incision and drainage.        Hospital Course  Patient is a 68 y.o. male admitted secondary to the large chest wall hematoma.  Patient was taken the operating suite on 06/08/2018 and underwent removal of large amount of clot and blood in the anterior chest wall on the right side.  Procedure well went to the recovery room then to the floor.  Through the weekend the patient had 90 cc out the Saturday and less than that on Sunday.  Head 10 cc out on Monday morning the chest wound looked to be septic tight together.  With the minimal drainage the Steve Dionisio were removed.  The patient was able to be discharged home.  Have tho removed by Dr. Gerardo Gutierrez in his office in 2 weeks' time.  The patient was to restart his Plavix starting tomorrow.    Procedures Performed  Procedure(s):  INCISION AND DRAINAGE RIGHT TRUNK HEMATOMA       Consults:   Consults     No orders found from 5/10/2018 to  6/9/2018.            Discharge Medications   Enio Tapia   Home Medication Instructions WILVER:[id number]    Printed on:06/11/18 8901   Medication Information                      acyclovir (ZOVIRAX) 400 MG tablet  TAKE ONE TABLET BY MOUTH DAILY             amLODIPine (NORVASC) 5 MG tablet  Take 1 tablet by mouth Daily.             aspirin 81 MG EC tablet  Take 81 mg by mouth Daily.             carvedilol (COREG) 3.125 MG tablet  TAKE ONE TABLET BY MOUTH TWICE A DAY WITH MEALS             clopidogrel (PLAVIX) 75 MG tablet  Take 1 tablet by mouth Daily.             HYDROcodone-acetaminophen (NORCO) 7.5-325 MG per tablet  Take 1 tablet by mouth Every 6 (Six) Hours As Needed for Moderate Pain .             isosorbide mononitrate (IMDUR) 60 MG 24 hr tablet  Take 1 tablet by mouth Daily.             levothyroxine (SYNTHROID, LEVOTHROID) 150 MCG tablet  Take 1 tablet by mouth Daily.             losartan (COZAAR) 50 MG tablet  Take 1 tablet by mouth Daily.             Multiple Vitamins-Minerals (CENTRUM SILVER PO)  Take 1 tablet by mouth Daily.             pravastatin (PRAVACHOL) 40 MG tablet  Take 2 tablets by mouth Daily.             sertraline (ZOLOFT) 100 MG tablet  TAKE ONE TABLET BY MOUTH DAILY *MUST MAKE APPOINTMENT FOR FURTHER REFILLS             travoprost, BAK free, (TRAVATAN) 0.004 % solution ophthalmic solution  1 drop every evening. in affected eye(s)                 Discharge Diet:   Diet Instructions     Diet: Cardiac; Thin       Discharge Diet:  Cardiac    Fluid Consistency:  Thin          Activity at Discharge:   Activity Instructions     Other Instructions (Specify)       No lifting with left upper extremity, and minimal usage till seen by Dr. Gutiererz in office in 2 weeks        Do not drive while taking narcotics  This discharge took less than 30 minutes to compile  Follow-up Appointments  Future Appointments  Date Time Provider Department Center   6/22/2018 11:15 AM Candy Ribeiro MD [facility] None    7/3/2018 10:30 AM Brandon Parker MD [facility] None   Follow-up with Dr. Gerardo Gutierrez in 2 weeks' time for staple removal.       FRANCO Nickerson  06/11/18  2:52 PM

## 2018-06-11 NOTE — PLAN OF CARE
Problem: Patient Care Overview  Goal: Plan of Care Review  Outcome: Ongoing (interventions implemented as appropriate)   06/11/18 0229   Coping/Psychosocial   Plan of Care Reviewed With patient   Plan of Care Review   Progress improving   OTHER   Outcome Summary Not much drainage from LUCRECIA. Possible pull drains tomorrow and d/c       Problem: Surgery Nonspecified (Adult)  Goal: Signs and Symptoms of Listed Potential Problems Will be Absent, Minimized or Managed (Surgery Nonspecified)  Outcome: Ongoing (interventions implemented as appropriate)      Problem: Infection, Risk/Actual (Adult)  Goal: Identify Related Risk Factors and Signs and Symptoms  Outcome: Ongoing (interventions implemented as appropriate)

## 2018-06-11 NOTE — PROGRESS NOTES
CTS Progress Note      POD 3 s/p Incision and drainage of right anterior chest wall hematoma     LOS: 3 days     Subjective  No acute events overnight. Doing well this am.     Objective    Vital Signs  Temp:  [98.2 °F (36.8 °C)-98.4 °F (36.9 °C)] 98.4 °F (36.9 °C)  Heart Rate:  [57-69] 64  Resp:  [18] 18  BP: ()/(58-84) 99/72    Physical Exam:   General Appearance: alert, appears stated age and cooperative   Lungs: clear to auscultation   Heart: regular rhythm & normal rate, normal S1, S2 and no murmur, no aren, no rub   Skin: Incision c/d/i     Results     Results from last 7 days  Lab Units 06/09/18  0446   WBC 10*3/mm3 5.03   HEMOGLOBIN g/dL 10.4*   HEMATOCRIT % 30.6*   PLATELETS 10*3/mm3 185       Results from last 7 days  Lab Units 06/09/18  0446   SODIUM mmol/L 137   POTASSIUM mmol/L 4.3   CHLORIDE mmol/L 104   CO2 mmol/L 27.0   BUN mg/dL 19   CREATININE mg/dL 0.86   GLUCOSE mg/dL 110*   CALCIUM mg/dL 8.5*       Imaging Results (last 24 hours)     ** No results found for the last 24 hours. **          Assessment  Principal Problem:    Hematoma of right chest wall      Plan   Ambulate  Pulm toilet   Remove drains today    home today, will restart plavix    FRANCO Whitfield  06/11/18  7:37 AM     As above. Drains out. D/C. Appt to see me in 2 weeks.   I have reviewed, verified, and confirmed the above history and current status.  I have examined the patient and confirmed the above physical findings.    Gerardo Gutierrez MD  CTSurgery  06/11/18   10:17 AM

## 2018-06-11 NOTE — PAYOR COMM NOTE
"Claim # [id number]  Utilization Review: [phone]  Enio Tapia (68 y.o. Male)     Date of Birth Social Security Number Address Home Phone MRN    1950  [address] [place] [place] [address] [phone] [id number]    Faith Marital Status          None        Admission Date Admission Type Admitting Provider Attending Provider Department, Room/Bed    6/8/18 Elective Gerardo Gutierrez MD Havens, Dennis, MD [address], S453/1    Discharge Date Discharge Disposition Discharge Destination         Home or Self Care              Attending Provider:  Gerardo Gutierrez MD    Allergies:  Statins    Isolation:  None   Infection:  None   Code Status:  FULL    Ht:  175.3 cm (69\")   Wt:  78.7 kg (173 lb 9.6 oz)    Admission Cmt:  None   Principal Problem:  Hematoma of right chest wall [S20.211A]                 Active Insurance as of 6/8/2018     Primary Coverage     Payor Plan Insurance Group Employer/Plan Group    WORKERS COMPENSATION [facility]      Payor Plan Address Payor Plan Phone Number Effective From Effective To    [address] [address] [phone] 5/19/2018     [facility] [address]       Subscriber Name Subscriber Birth Date Member ID       ENIO TAPIA [id number]                 Emergency Contacts      (Rel.) Home Phone Work Phone Mobile Phone    Judit Tapia (Spouse) [phone] -- [phone]               History & Physical      Gerardo Gutierrez MD at 6/8/2018  7:21 AM          H&P reviewed. The patient was examined and there are no changes to the H&P.       NOTE: States incision has \"gotten tighter and more painful and is causing him to breathe more shallow.\"    Heart: irregular no MRG  Lungs: CTAB    Immunizations:    Tetanus: 5/2018     Influenza: 2017     Pneumo: UTD    Labs were reviewed.     EKG: freq PVC's bradycardia    Plan: incision and drainage hematoma right trunk, thoracotomy incision right    Above H&P reviewed.   I have reviewed, verified, and " confirmed the above history and current status.  I have examined the patient and confirmed the above physical findings.    Gerardo Gutierrez MD  CTSurgery  06/08/18   7:35 AM      Electronically signed by Gerardo Gutierrez MD at 6/8/2018  3:02 PM   Source Note               CTS Progress Note    Chief Complaint: Chest wall pain      Subjective  In bed sitting up.  Pleasant.  Conversant.      Objective    Physical Exam:   Vital Signs   Temp:  [98.2 °F (36.8 °C)-99.4 °F (37.4 °C)] 98.2 °F (36.8 °C)  Heart Rate:  [60-79] 63  Resp:  [16-18] 18  BP: (103-127)/(54-63) 103/58   GEN: NAD   CV: Regular without murmur    RESP: Distant but clear to auscultation   ABD: Soft nontender positive bowel sounds    EXT: Warm without peripheral edema   Anterior chest wall: Softball size hematoma in her right anterior chest.  I removed the bandage.  Silvadene cream noted.  Some thick yellow discharge noted on the bandage.  No odor     Results       Results from last 7 days  Lab Units 05/22/18  0446   WBC 10*3/mm3 5.73   HEMOGLOBIN g/dL 8.8*   HEMATOCRIT % 25.5*   PLATELETS 10*3/mm3 182       Results from last 7 days  Lab Units 05/20/18  0011   SODIUM mmol/L 136   POTASSIUM mmol/L 4.2   CHLORIDE mmol/L 107   CO2 mmol/L 23.0   BUN mg/dL 20   CREATININE mg/dL 0.90   GLUCOSE mg/dL 149*   CALCIUM mg/dL 8.2*       Results from last 7 days  Lab Units 05/22/18  0446   INR  1.03             Assessment/Plan   Past to be a stable right anterior chest hematoma secondary to horse bite.  Currently being treated with Silvadene cream and IV Zosyn.  Active Problems:    Hematoma of right chest wall  Anemia--stable last 28 hours      Plan   Discuss with Dr Gutierrez  Questionable Restart Plavix  Continue watch H&H while as an inpatient  FRANCO Cortes  05/22/18  8:08 AM        As above.  Seen earlier. Ready for D/C.  Probably will need a month off work. I have reviewed, verified, and confirmed the above history and current status.  I have examined the patient  and confirmed the above physical findings.    Gerardo Gutierrez MD  CTSurgery  05/22/18   4:08 PM                     Electronically signed by Gerardo Gutierrez MD at 5/22/2018  4:08 PM             Gerardo Gutierrez MD at 5/22/2018  8:08 AM            CTS Progress Note    Chief Complaint: Chest wall pain      Subjective  In bed sitting up.  Pleasant.  Conversant.      Objective    Physical Exam:   Vital Signs   Temp:  [98.2 °F (36.8 °C)-99.4 °F (37.4 °C)] 98.2 °F (36.8 °C)  Heart Rate:  [60-79] 63  Resp:  [16-18] 18  BP: (103-127)/(54-63) 103/58   GEN: NAD   CV: Regular without murmur    RESP: Distant but clear to auscultation   ABD: Soft nontender positive bowel sounds    EXT: Warm without peripheral edema   Anterior chest wall: Softball size hematoma in her right anterior chest.  I removed the bandage.  Silvadene cream noted.  Some thick yellow discharge noted on the bandage.  No odor     Results       Results from last 7 days  Lab Units 05/22/18  0446   WBC 10*3/mm3 5.73   HEMOGLOBIN g/dL 8.8*   HEMATOCRIT % 25.5*   PLATELETS 10*3/mm3 182       Results from last 7 days  Lab Units 05/20/18  0011   SODIUM mmol/L 136   POTASSIUM mmol/L 4.2   CHLORIDE mmol/L 107   CO2 mmol/L 23.0   BUN mg/dL 20   CREATININE mg/dL 0.90   GLUCOSE mg/dL 149*   CALCIUM mg/dL 8.2*       Results from last 7 days  Lab Units 05/22/18  0446   INR  1.03             Assessment/Plan   Past to be a stable right anterior chest hematoma secondary to horse bite.  Currently being treated with Silvadene cream and IV Zosyn.  Active Problems:    Hematoma of right chest wall  Anemia--stable last 28 hours      Plan   Discuss with Dr Gutierrez  Questionable Restart Plavix  Continue watch H&H while as an inpatient  FRANCO Cortes  05/22/18  8:08 AM        As above.  Seen earlier. Ready for D/C.  Probably will need a month off work. I have reviewed, verified, and confirmed the above history and current status.  I have examined the patient and confirmed the above  physical findings.    Gerardo Gutierrez MD  CTSurgery  18   4:08 PM                    Electronically signed by Gerardo Gutierrez MD at 2018  4:08 PM          Operative/Procedure Notes (all)      Gerardo Gutierrez MD at 2018  8:58 AM  Version 1 of 1       Operative Report      Enio Tapia    : 1950  MRN: [id number]  CSN: [id number]      Date:18      Preop Diagnosis: Large hematoma anterior chest wall (right).      Postop Diagnosis: Large hematoma anterior chest wall (right).      Procedure: Incision and drainage of large right anterior chest wall hematoma with antibiotic pulsatile irrigation and debridement      Surgeon: Gerardo Gutierrez MD      Assistant: Allie Rodriguez PA-C      Indications: 68-year-old  male working on local sounds by a horse.  The patient sustained a large anterior chest wall hematoma.  Initially this was felt to be small enough that it would resolve on its own however after a few days of the patient noted that the hematoma was enlarging in size.  He is brought to the operating room at this point in time for incision and drainage.      Operative Findings: Large amount of blood clot removed.  The clot was sent for routine pathology and for culture.  Was irrigated with 3000 cc of pulsatile saline and with antibiotic-containing saline.  The drains were placed to suction to hold the flap down.      EBL:10cc      Operative Narrative: The patient was brought to the operating room and prepared for surgical intervention in the usual fashion.  A timeout was called.  The patient's identity, appropriate side of the procedure, the procedure itself, and the availability of appropriate equipment and personnel was verified.  Patient's allergies were discussed and antibiotic administration was verified.  Following satisfactory inhalation anesthesia the chest was prepped and draped in the usual fashion.  A 3 inch incision over the hematoma was carried down through the  subcutaneous tissue and the hematoma was entered.  400-500cc of clotted blood was removed from the wound.  The wound was irrigated with saline utilizing a pulsatile delivery system.  The wound was then irrigated with antibiotic solution.2 (#19) LUCRECIA drains were brought out through separate stab wounds and connected to wall suction.  The incision was closed with 3-0 Vicryl and skin staples.  Sterile dressings were placed.  The patient was transferred to the recovery room in stable condition.        Gerardo Gutierrez MD  CTSurgery  06/08/18   1:04 PM          Electronically signed by Gerardo Gutierrez MD at 6/8/2018  1:13 PM          Physician Progress Notes (all)      Gerardo Gutierrez MD at 6/11/2018  7:36 AM          CTS Progress Note      POD 3 s/p Incision and drainage of right anterior chest wall hematoma     LOS: 3 days     Subjective  No acute events overnight. Doing well this am.     Objective    Vital Signs  Temp:  [98.2 °F (36.8 °C)-98.4 °F (36.9 °C)] 98.4 °F (36.9 °C)  Heart Rate:  [57-69] 64  Resp:  [18] 18  BP: ()/(58-84) 99/72    Physical Exam:   General Appearance: alert, appears stated age and cooperative   Lungs: clear to auscultation   Heart: regular rhythm & normal rate, normal S1, S2 and no murmur, no aren, no rub   Skin: Incision c/d/i     Results     Results from last 7 days  Lab Units 06/09/18  0446   WBC 10*3/mm3 5.03   HEMOGLOBIN g/dL 10.4*   HEMATOCRIT % 30.6*   PLATELETS 10*3/mm3 185       Results from last 7 days  Lab Units 06/09/18  0446   SODIUM mmol/L 137   POTASSIUM mmol/L 4.3   CHLORIDE mmol/L 104   CO2 mmol/L 27.0   BUN mg/dL 19   CREATININE mg/dL 0.86   GLUCOSE mg/dL 110*   CALCIUM mg/dL 8.5*       Imaging Results (last 24 hours)     ** No results found for the last 24 hours. **          Assessment  Principal Problem:    Hematoma of right chest wall      Plan   Ambulate  Pulm toilet   Remove drains today    home today, will restart plavix    FRANCO Whitfield  06/11/18  7:37 AM      As above. Drains out. D/C. Appt to see me in 2 weeks.   I have reviewed, verified, and confirmed the above history and current status.  I have examined the patient and confirmed the above physical findings.    Gerardo Gutierrez MD  CTSurgery  06/11/18   10:17 AM            Electronically signed by Gerardo Gutierrez MD at 6/11/2018 10:17 AM     Bob Brink MD at 6/10/2018  6:35 AM            CTS Progress Note      POD 2 s/p Incision and drainage of large right anterior chest wall hematoma with antibiotic pulsatile irrigation and debridement  Chief complaint  hematoma  Subjective  Sitting up in bed.  Conversant.  No complaint.      Objective    Physical Exam:   Vital Signs   Temp:  [98.1 °F (36.7 °C)-99.1 °F (37.3 °C)] 98.9 °F (37.2 °C)  Heart Rate:  [56-75] 56  Resp:  [16-17] 17  BP: (104-138)/(57-78) 124/78   GEN: NAD   CV: Regular without murmur    RESP: Clear to auscultation   EXT: Warm without edema   Incision: Clean dry and intact with skin staples     Results       Results from last 7 days  Lab Units 06/09/18  0446   WBC 10*3/mm3 5.03   HEMOGLOBIN g/dL 10.4*   HEMATOCRIT % 30.6*   PLATELETS 10*3/mm3 185       Results from last 7 days  Lab Units 06/09/18  0446   SODIUM mmol/L 137   POTASSIUM mmol/L 4.3   CHLORIDE mmol/L 104   CO2 mmol/L 27.0   BUN mg/dL 19   CREATININE mg/dL 0.86   GLUCOSE mg/dL 110*   CALCIUM mg/dL 8.5*       Results from last 7 days  Lab Units 06/07/18  1343   INR  1.09           Assessment/Plan   POD #2 s/p I&D chest wound/hematoma  Principal Problem:    Hematoma of right chest wall        Plan   Continue drain  ABX     FRANCO Cortes  06/10/18  6:35 AM                    Electronically signed by Bob Brink MD at 6/10/2018 10:01 AM     Bob Brink MD at 6/9/2018  7:10 AM          CTS Progress Note      POD 1 s/p Incision and drainage of large right anterior chest wall hematoma with antibiotic pulsatile irrigation and debridement          LOS: 1 day   Patient Care  Team:  Troy Mckay MD as PCP - General (Family Medicine)  Troy Mckay MD as PCP - Family Medicine    Subjective  Feels better, no C/O    CC: Incision and drainage of large right anterior chest wall hematoma with antibiotic pulsatile irrigation and debridement       Objective    Vital Signs  Temp:  [97.4 °F (36.3 °C)-98.4 °F (36.9 °C)] 98.1 °F (36.7 °C)  Heart Rate:  [52-86] 62  Resp:  [16-20] 16  BP: (100-170)/(53-91) 100/53  FiO2 (%):  [21 %] 21 %    Physical Exam:   General Appearance: alert, appears stated age and cooperative   Lungs: clear to auscultation, respirations regular, respirations even and respirations unlabored   Heart: regular rhythm & normal rate, normal S1, S2, no murmur, no aren, no rub and no click   Skin:  Incision c/d/i     Results     Results from last 7 days  Lab Units 06/09/18  0446   WBC 10*3/mm3 5.03   HEMOGLOBIN g/dL 10.4*   HEMATOCRIT % 30.6*   PLATELETS 10*3/mm3 185       Results from last 7 days  Lab Units 06/09/18  0446   SODIUM mmol/L 137   POTASSIUM mmol/L 4.3   CHLORIDE mmol/L 104   CO2 mmol/L 27.0   BUN mg/dL 19   CREATININE mg/dL 0.86   GLUCOSE mg/dL 110*   CALCIUM mg/dL 8.5*           Imaging Results (last 24 hours)     Procedure Component Value Units Date/Time    XR Chest 1 View [[id number]] Collected:  06/08/18 1625     Updated:  06/08/18 1643    Narrative:       EXAMINATION: XR CHEST 1 VW- 06/08/2018     INDICATION: Chest wall hematoma; S20.211A-Contusion of right front wall  of thorax, initial encounter      COMPARISON: 06/07/2018     FINDINGS: Portable chest reveals cardiac and mediastinal silhouettes to  be within normal limits. Drainage catheter seen overlying the chest wall  with surgical staples seen overlying the right upper chest wall.  Degenerative changes seen within the spine. The lung fields are grossly  clear. Atelectatic changes seen within the left lung base.           Impression:       Surgical drainage catheter seen overlying the right upper  chest  wall with haziness resolved in the interval. Surgical staples are  identified. Lung fields are grossly clear.     D:  06/08/2018  E:  06/08/2018     This report was finalized on 6/8/2018 4:41 PM by Dr. Alma Delia Escalera MD.             Assessment    Principal Problem:    Hematoma of right chest wall  drains at 90cc last 18 hours      Plan   Continue drain till Monday, then out and home    FRANCO Nickerson  06/09/18  7:10 AM      Electronically signed by Bob Brink MD at 6/9/2018  7:28 AM     Gerardo Gutierrez MD at 6/8/2018  2:54 PM          This man has had a stable postoperative course.  Suction on his LUCRECIA drains is successfully keeping the flap down.  The drains need to be kept to continuous suction interruption.  The patient may get out of bed to a chair but cannot ambulate in the halls because dissection needs to be continuous.  Drains will be left in place at least until Monday or Tuesday.   I have reviewed, verified, and confirmed the above history and current status.  I have examined the patient and confirmed the above physical findings.    Gerardo Gutierrez MD  CTSurgery  06/08/18   2:55 PM        Electronically signed by Gerardo Gutierrez MD at 6/8/2018  2:55 PM

## 2018-06-12 LAB
BACTERIA FLD CULT: NORMAL
GRAM STN SPEC: NORMAL
GRAM STN SPEC: NORMAL

## 2018-06-12 NOTE — PAYOR COMM NOTE
"Utilization Review: [phone]  Please call with approval of hospital stay. Claim # [id number]  Enio Tapia (68 y.o. Male)     Date of Birth Social Security Number Address Home Phone MRN    1950  [address] [place] [address] [phone] [id number]    Yarsanism Marital Status          None        Admission Date Admission Type Admitting Provider Attending Provider Department, Room/Bed    6/8/18 Elective Gerardo Gutierrez MD  [address], S[address]/1    Discharge Date Discharge Disposition Discharge Destination        6/11/2018 Home or Self Care              Attending Provider:  (none)   Allergies:  Statins    Isolation:  None   Infection:  None   Code Status:  Prior    Ht:  175.3 cm (69\")   Wt:  78.7 kg (173 lb 9.6 oz)    Admission Cmt:  None   Principal Problem:  Hematoma of right chest wall [S20.211A]                 Active Insurance as of 6/8/2018     Primary Coverage     Payor Plan Insurance Group Employer/Plan Group    WORKERS COMPENSATION [facility]      Payor Plan Address Payor Plan Phone Number Effective From Effective To    [address] [address] [phone] 5/19/2018     [facility] [id number]       Subscriber Name Subscriber Birth Date Member ID       ENIO TAPIA [id number]                 Emergency Contacts      (Rel.) Home Phone Work Phone Mobile Phone    Judit Tapia (Spouse) [phone] -- [phone]               Discharge Summary      FRANCO Liz at 6/11/2018  1:54 PM          CTS Discharge Summary    Patient Care Team:  Troy Mckay MD as PCP - General (Family Medicine)  Troy Mckay MD as PCP - Family Medicine      Date of Admission: 6/8/2018  6:41 AM  Date of Discharge:  06/11/2018    Discharge Diagnosis  Past Medical History:   Diagnosis Date   • Arthritis    • Coronary artery disease involving native coronary artery of native heart with unstable angina pectoris 3/31/2017   • Depression    • Disease of thyroid gland    • " Enlarged prostate    • Frequent PVCs    • Glaucoma    • Hematoma     right chest   • Hyperlipidemia    • Hypertension    • GRABIEL on CPAP        Principal Problem:    Hematoma of right chest wall      History of Present Illness68-year-old  male working on local sounds by a horse.  The patient sustained a large anterior chest wall hematoma.  Initially this was felt to be small enough that it would resolve on its own however after a few days of the patient noted that the hematoma was enlarging in size.  He is brought to the operating room at this point in time for incision and drainage.        Hospital Course  Patient is a 68 y.o. male admitted secondary to the large chest wall hematoma.  Patient was taken the operating suite on 06/08/2018 and underwent removal of large amount of clot and blood in the anterior chest wall on the right side.  Procedure well went to the recovery room then to the floor.  Through the weekend the patient had 90 cc out the Saturday and less than that on Sunday.  Head 10 cc out on Monday morning the chest wound looked to be septic tight together.  With the minimal drainage the Steve Dionisio were removed.  The patient was able to be discharged home.  Have tho removed by Dr. Gerardo Gutierrez in his office in 2 weeks' time.  The patient was to restart his Plavix starting tomorrow.    Procedures Performed  Procedure(s):  INCISION AND DRAINAGE RIGHT TRUNK HEMATOMA       Consults:   Consults     No orders found from 5/10/2018 to 6/9/2018.            Discharge Medications   Enio Tapia   Home Medication Instructions WILVER:[id number]    Printed on:06/11/18 3017   Medication Information                      acyclovir (ZOVIRAX) 400 MG tablet  TAKE ONE TABLET BY MOUTH DAILY             amLODIPine (NORVASC) 5 MG tablet  Take 1 tablet by mouth Daily.             aspirin 81 MG EC tablet  Take 81 mg by mouth Daily.             carvedilol (COREG) 3.125 MG tablet  TAKE ONE TABLET BY MOUTH TWICE  A DAY WITH MEALS             clopidogrel (PLAVIX) 75 MG tablet  Take 1 tablet by mouth Daily.             HYDROcodone-acetaminophen (NORCO) 7.5-325 MG per tablet  Take 1 tablet by mouth Every 6 (Six) Hours As Needed for Moderate Pain .             isosorbide mononitrate (IMDUR) 60 MG 24 hr tablet  Take 1 tablet by mouth Daily.             levothyroxine (SYNTHROID, LEVOTHROID) 150 MCG tablet  Take 1 tablet by mouth Daily.             losartan (COZAAR) 50 MG tablet  Take 1 tablet by mouth Daily.             Multiple Vitamins-Minerals (CENTRUM SILVER PO)  Take 1 tablet by mouth Daily.             pravastatin (PRAVACHOL) 40 MG tablet  Take 2 tablets by mouth Daily.             sertraline (ZOLOFT) 100 MG tablet  TAKE ONE TABLET BY MOUTH DAILY *MUST MAKE APPOINTMENT FOR FURTHER REFILLS             travoprost, BAK free, (TRAVATAN) 0.004 % solution ophthalmic solution  1 drop every evening. in affected eye(s)                 Discharge Diet:   Diet Instructions     Diet: Cardiac; Thin       Discharge Diet:  Cardiac    Fluid Consistency:  Thin          Activity at Discharge:   Activity Instructions     Other Instructions (Specify)       No lifting with left upper extremity, and minimal usage till seen by Dr. Gutierrez in office in 2 weeks        Do not drive while taking narcotics  This discharge took less than 30 minutes to compile  Follow-up Appointments  Future Appointments  Date Time Provider Department Center   6/22/2018 11:15 AM Candy Ribeiro MD [facility] None   7/3/2018 10:30 AM Brandon Parker MD [facility] None   Follow-up with Dr. Gerardo Gutierrez in 2 weeks' time for staple removal.       FRANCO Nickerson  06/11/18  2:52 PM              Electronically signed by Gerardo Gutierrez MD at 6/12/2018  8:10 AM

## 2018-06-15 LAB — BACTERIA SPEC ANAEROBE CULT: ABNORMAL

## 2018-06-18 NOTE — PAYOR COMM NOTE
"Enio Tapia (68 y.o. Male)     Date of Birth Social Security Number Address Home Phone MRN    1950  [address] [place] [address] [phone] [id number]    [employer] Marital Status          None        Admission Date Admission Type Admitting Provider Attending Provider Department, Room/Bed    18 Elective Gerardo Gutierrez MD  [address], S453/1    Discharge Date Discharge Disposition Discharge Destination        2018 Home or Self Care              Attending Provider:  (none)   Allergies:  Statins    Isolation:  None   Infection:  None   Code Status:  Prior    Ht:  175.3 cm (69\")   Wt:  78.7 kg (173 lb 9.6 oz)    Admission Cmt:  None   Principal Problem:  Hematoma of right chest wall [S20.211A]                 Active Insurance as of 2018     Primary Coverage     Payor Plan Insurance Group Employer/Plan Group    WORKERS COMPENSATION [facility]      Payor Plan Address Payor Plan Phone Number Effective From Effective To    [address] [address] [phone] 2018     Formerly [facility] [id number]       Subscriber Name Subscriber Birth Date Member ID       ENIO TAPIA 1950 [id number]                 Emergency Contacts      (Rel.) Home Phone Work Phone Mobile Phone    Judit Tapia (Spouse) [phone] -- [phone]               Operative/Procedure Notes (most recent note)      Gerardo Gutierrez MD at 2018  8:58 AM        Operative Report      Enio Tapia    : 1950  MRN: [id number]  CSN: [id number]      Date:18      Preop Diagnosis: Large hematoma anterior chest wall (right).      Postop Diagnosis: Large hematoma anterior chest wall (right).      Procedure: Incision and drainage of large right anterior chest wall hematoma with antibiotic pulsatile irrigation and debridement      Surgeon: Gerardo Gutierrez MD      Assistant: Allie Rodriguez PA-C      Indications: 68-year-old  male working on local [email or web address] Ã¨â€¹Â±Ã¨â€¦Â¾Ã§Â½â€˜ by a horse.  The " patient sustained a large anterior chest wall hematoma.  Initially this was felt to be small enough that it would resolve on its own however after a few days of the patient noted that the hematoma was enlarging in size.  He is brought to the operating room at this point in time for incision and drainage.      Operative Findings: Large amount of blood clot removed.  The clot was sent for routine pathology and for culture.  Was irrigated with 3000 cc of pulsatile saline and with antibiotic-containing saline.  The drains were placed to suction to hold the flap down.      EBL:10cc      Operative Narrative: The patient was brought to the operating room and prepared for surgical intervention in the usual fashion.  A timeout was called.  The patient's identity, appropriate side of the procedure, the procedure itself, and the availability of appropriate equipment and personnel was verified.  Patient's allergies were discussed and antibiotic administration was verified.  Following satisfactory inhalation anesthesia the chest was prepped and draped in the usual fashion.  A 3 inch incision over the hematoma was carried down through the subcutaneous tissue and the hematoma was entered.  400-500cc of clotted blood was removed from the wound.  The wound was irrigated with saline utilizing a pulsatile delivery system.  The wound was then irrigated with antibiotic solution.2 (#19) LUCRECIA drains were brought out through separate stab wounds and connected to wall suction.  The incision was closed with 3-0 Vicryl and skin staples.  Sterile dressings were placed.  The patient was transferred to the recovery room in stable condition.        Gerardo Gutierrez MD  CTSurgery  06/08/18   1:04 PM          Electronically signed by Gerardo Gutierrez MD at 6/8/2018  1:13 PM      Progress Notes  Encounter Date: 6/7/2018  Gerardo Gutierrez MD   Cardiothoracic Surgery   Expand All Collapse All    []Manual[]Template  []Copied  06/07/2018  Patient  Information  Enio Tapia                                                                                          [address] [place] [address]   1950  'PCP/Referring Physician'  Troy Mckay MD  [phone]  No ref. provider found       Chief Complaint   Patient presents with   • Consult       hematoma of right chest wall   • Pain   CC: My chest hurts every time I removed my arm     History of Present Illness: 68-year-old  male attacked by a horse, two and one half weeks ago.  The patient had a large hematoma of the right anterior chest wall.  He was treated with antibiotics for 3 days and discharged.  Following discharge the patient noted increased swelling in the area of the right anterior chest and increased pain with movement of his right arm.  He now has a hematoma of the right pectoralis muscle.(Fragment was larger and he was discharged from the hospital.  The patient has not had fever, chills, or night sweats.            Patient Active Problem List   Diagnosis   • Hyperlipidemia   • Hypertension   • Hypothyroidism   • Irritable bowel syndrome   • Sleep apnea   • Generalized anxiety disorder   • Inguinal hernia   • Herpes simplex infection   • Coronary artery disease involving native coronary artery of native heart with unstable angina pectoris   • Hematoma of right chest wall   • Frequent unifocal PVCs      Medical History        Past Medical History:   Diagnosis Date   • Arthritis     • Coronary artery disease involving native coronary artery of native heart with unstable angina pectoris 3/31/2017   • Disease of thyroid gland     • Hematoma     • Hyperlipidemia     • Hypertension     • Mental disorder           Surgical History         Past Surgical History:   Procedure Laterality Date   • CARDIAC CATHETERIZATION       • CARDIAC CATHETERIZATION N/A 3/31/2017     Procedure: Left Heart Cath;  Surgeon: Enio Benavidez MD;  Location: [facility] CATH INVASIVE LOCATION;  Service:    •  CORONARY STENT PLACEMENT   10/16/2004   • TONSILLECTOMY       • VASECTOMY   08/1998            Current Outpatient Prescriptions:   •  acyclovir (ZOVIRAX) 400 MG tablet, TAKE ONE TABLET BY MOUTH DAILY, Disp: 30 tablet, Rfl: 3  •  amLODIPine (NORVASC) 5 MG tablet, Take 1 tablet by mouth Daily., Disp: 30 tablet, Rfl: 11  •  aspirin 81 MG EC tablet, Take 81 mg by mouth Daily., Disp: , Rfl:   •  carvedilol (COREG) 3.125 MG tablet, TAKE ONE TABLET BY MOUTH TWICE A DAY WITH MEALS, Disp: 60 tablet, Rfl: 5  •  isosorbide mononitrate (IMDUR) 60 MG 24 hr tablet, Take 1 tablet by mouth Daily., Disp: 30 tablet, Rfl: 11  •  levothyroxine (SYNTHROID, LEVOTHROID) 150 MCG tablet, Take 1 tablet by mouth Daily., Disp: 90 tablet, Rfl: 2  •  losartan (COZAAR) 50 MG tablet, Take 1 tablet by mouth Daily., Disp: 90 tablet, Rfl: 2  •  pravastatin (PRAVACHOL) 40 MG tablet, Take 2 tablets by mouth Daily., Disp: 90 tablet, Rfl: 3  •  sertraline (ZOLOFT) 100 MG tablet, TAKE ONE TABLET BY MOUTH DAILY *MUST MAKE APPOINTMENT FOR FURTHER REFILLS, Disp: 30 tablet, Rfl: 0  •  silver sulfadiazine (SILVADENE, SSD) 1 % cream, Apply  topically Daily., Disp: 20 g, Rfl: 0  •  travoprost, PAUL free, (TRAVATAN) 0.004 % solution ophthalmic solution, 1 drop every evening. in affected eye(s), Disp: , Rfl:   •  clopidogrel (PLAVIX) 75 MG tablet, Take 1 tablet by mouth Daily., Disp: 30 tablet, Rfl: 11       Allergies   Allergen Reactions   • Statins Myalgia      Social History   Social History            Social History   • Marital status:        Spouse name: N/A   • Number of children: 0   • Years of education: N/A           Occupational History   • horse groomer               Social History Main Topics   • Smoking status: Never Smoker   • Smokeless tobacco: Former User       Types: Chew, Snuff       Quit date: 01/2002   • Alcohol use 7.2 oz/week       12 Cans of beer per week         Comment: occas   • Drug use: No   • Sexual activity: Defer          "  Other Topics Concern   • Not on file          Social History Narrative     The patient lives with his wife in [place].               Family History   Problem Relation Age of Onset   • Heart disease Mother     • Heart failure Mother     • Pneumonia Father        Review of Systems   Constitution: Negative for chills, fever, malaise/fatigue, night sweats and weight loss.   HENT: Negative for congestion, hearing loss, odynophagia and sore throat.    Cardiovascular: Negative for chest pain, dyspnea on exertion, leg swelling, orthopnea and palpitations.   Respiratory: Negative for cough, hemoptysis, shortness of breath and wheezing.    Endocrine: Negative for cold intolerance, heat intolerance, polydipsia, polyphagia and polyuria.   Hematologic/Lymphatic: Does not bruise/bleed easily.   Skin: Negative for itching, rash and suspicious lesions.   Musculoskeletal: Positive for back pain and stiffness. Negative for arthritis, joint pain, joint swelling and myalgias.   Gastrointestinal: Negative for abdominal pain, constipation, diarrhea, hematemesis, hematochezia, melena, nausea and vomiting.   Genitourinary: Negative for dysuria, frequency and hematuria.   Neurological: Negative for focal weakness, headaches, numbness and seizures.   Psychiatric/Behavioral: Negative for suicidal ideas.   Allergic/Immunologic: Negative for environmental allergies, HIV exposure, hives and persistent infections.   All other systems reviewed and are negative.     Vitals       Vitals:     06/07/18 1154   BP: 111/75   BP Location: Right arm   Patient Position: Sitting   Pulse: 51   Temp: 98.3 °F (36.8 °C)   SpO2: 98%   Weight: 79.4 kg (175 lb)   Height: 175.3 cm (69\")         Physical Exam   Constitutional: He is oriented to person, place, and time. He appears well-developed and well-nourished. No distress.   HENT:   Head: Normocephalic.   Right Ear: External ear normal.   Left Ear: External ear normal.   Nose: Nose normal.   Eyes: Pupils " are equal, round, and reactive to light.   Neck: Normal range of motion. Neck supple. No tracheal deviation present. No thyromegaly present.   Cardiovascular: Normal rate, normal heart sounds and intact distal pulses.    No murmur heard.  Pulmonary/Chest: Effort normal and breath sounds normal. No respiratory distress. He has no wheezes. He has no rales. He exhibits no tenderness.   Abdominal: Soft. Bowel sounds are normal. He exhibits no distension and no mass. There is no tenderness.   Musculoskeletal: Normal range of motion. He exhibits no edema.   15 x 15 x 12 cm hematoma right anterior chest wall.  Large amount of ecchymosis surrounding area.  This in the area of the hematoma   Lymphadenopathy:     He has no cervical adenopathy.   Neurological: He is alert and oriented to person, place, and time. He has normal reflexes. No cranial nerve deficit.   Skin: Skin is warm and dry. No rash noted. No erythema.   Psychiatric: He has a normal mood and affect.         Labs/Imaging: None     Assessment: Enlarging hematoma right anterior chest     Plan: Schedule surgical incision and drainage of hematoma.  I have discussed the procedure in detail with the patient including risks such as stroke, heart attack, death, bleeding, infection, etc. the patient understands the proposed procedure and is agreeable.                Patient Active Problem List   Diagnosis   • Hyperlipidemia   • Hypertension   • Hypothyroidism   • Irritable bowel syndrome   • Sleep apnea   • Generalized anxiety disorder   • Inguinal hernia   • Herpes simplex infection   • Coronary artery disease involving native coronary artery of native heart with unstable angina pectoris   • Hematoma of right chest wall   • Frequent unifocal PVCs      Signed by:           Gerardo Gutierrez MD  CTSurgery  06/08/18   2:50 PM                               Office Visit on 6/7/2018            Revision & Routing History            Detailed Report

## 2018-06-19 ENCOUNTER — TELEPHONE (OUTPATIENT)
Dept: CARDIAC SURGERY | Facility: CLINIC | Age: 68
End: 2018-06-19

## 2018-06-19 NOTE — TELEPHONE ENCOUNTER
Patient called returning his hematoma drain on 6/8/2018. He was d/c on 6/11/2018. Patient states the drained area is swollen and red. Patient is afraid this area may be infected. Patient would like to be seen and has workmans comp, so he cannot see his PCP regarding this. I suggested he see Yoni on Wednesday if possible.     Per Cindy, pt can see Yoni on Wednesday at 10:00 am. She has placed him on the schedule.     I called patient and informed him of the appointment day and time. I also informed him he will be seeing a PA. He states understanding and gratitude for this appointment.

## 2018-06-20 ENCOUNTER — OFFICE VISIT (OUTPATIENT)
Dept: CARDIAC SURGERY | Facility: CLINIC | Age: 68
End: 2018-06-20

## 2018-06-20 VITALS
DIASTOLIC BLOOD PRESSURE: 70 MMHG | TEMPERATURE: 97.3 F | HEIGHT: 69 IN | HEART RATE: 44 BPM | BODY MASS INDEX: 24.88 KG/M2 | WEIGHT: 168 LBS | SYSTOLIC BLOOD PRESSURE: 120 MMHG | OXYGEN SATURATION: 96 %

## 2018-06-20 DIAGNOSIS — S20.211D HEMATOMA OF RIGHT CHEST WALL, SUBSEQUENT ENCOUNTER: Primary | ICD-10-CM

## 2018-06-20 PROCEDURE — 99212 OFFICE O/P EST SF 10 MIN: CPT | Performed by: PHYSICIAN ASSISTANT

## 2018-06-20 NOTE — PROGRESS NOTES
06/20/2018  Patient Information  Enio Tapia                                                                                          [address] [place] [address]   1950  'PCP/Referring Physician'  Troy Mckay MD  [phone]  No ref. provider found    Chief Complaint   Patient presents with   • Post-op Follow-up     Evaluate Chest Wall Hematome Incision-Mild Redness and Swelling       History of Present Illness:  Patient is a 68-year-old  male with history of anterior chest wall hematoma secondary to trauma status post anterior chest wall incision and drainage with antibiotic pulsatile irrigation performed 6/8/18.  Mr. Tapia presents to office prematurely for evaluation of right anterior chest incision, with concerns that it may be infected.  The patient denies any incisional pain, fever, chills, nausea/vomiting, purulent discharge, gross blood or serosanguineous discharge.  His temperature today in office is 97.3°F.  He states that he was discharged from the hospital on 6/11/18, and since 6/14/18 - he has noticed gradual enlargement of the right anterior chest wall hematoma site until yesterday.  It is crucial to state that the patient is on Plavix, and has been since March 2017 secondary to coronary artery stent placement.  The patient stated that his Plavix was stopped for the operation, but was restarted on 6/11/18.  He will be seeing his primary cardiologist, Dr. Ribeiro, on 6/22/18 where he would like to discuss discontinuing his Plavix therapy.  He is otherwise doing well.    Patient Active Problem List   Diagnosis   • Hyperlipidemia   • Hypertension   • Hypothyroidism   • Irritable bowel syndrome   • Sleep apnea   • Generalized anxiety disorder   • Inguinal hernia   • Herpes simplex infection   • Coronary artery disease involving native coronary artery of native heart with unstable angina pectoris   • Hematoma of right chest wall   • Frequent unifocal PVCs     Past Medical  History:   Diagnosis Date   • Arthritis    • Chest wall hematoma    • Coronary artery disease involving native coronary artery of native heart with unstable angina pectoris 3/31/2017   • Depression    • Disease of thyroid gland    • Enlarged prostate    • Frequent PVCs    • Glaucoma    • Hematoma     right chest   • Hyperlipidemia    • Hypertension    • GRABIEL on CPAP      Past Surgical History:   Procedure Laterality Date   • CARDIAC CATHETERIZATION     • CARDIAC CATHETERIZATION N/A 3/31/2017    Procedure: Left Heart Cath;  Surgeon: Enio Benavidez MD;  Location:  [facility] CATH INVASIVE LOCATION;  Service:    • CATARACT EXTRACTION Bilateral    • COLONOSCOPY     • CORONARY STENT PLACEMENT  10/16/2004, 2017   • INCISION AND DRAINAGE HEMATOMA  06/08/2018    Chest Wall   • INCISION AND DRAINAGE LEG Right 6/8/2018    Procedure: INCISION AND DRAINAGE RIGHT TRUNK HEMATOMA;  Surgeon: Gerardo Gutierrez MD;  Location:  [facility] OR;  Service: Cardiothoracic   • TONSILLECTOMY     • VASECTOMY  08/1998       Current Outpatient Prescriptions:   •  acyclovir (ZOVIRAX) 400 MG tablet, TAKE ONE TABLET BY MOUTH DAILY, Disp: 30 tablet, Rfl: 3  •  amLODIPine (NORVASC) 5 MG tablet, Take 1 tablet by mouth Daily. (Patient taking differently: Take 5 mg by mouth Daily.), Disp: 30 tablet, Rfl: 11  •  aspirin 81 MG EC tablet, Take 81 mg by mouth Daily., Disp: , Rfl:   •  carvedilol (COREG) 3.125 MG tablet, TAKE ONE TABLET BY MOUTH TWICE A DAY WITH MEALS, Disp: 60 tablet, Rfl: 5  •  clopidogrel (PLAVIX) 75 MG tablet, Take 1 tablet by mouth Daily., Disp: 30 tablet, Rfl: 1  •  HYDROcodone-acetaminophen (NORCO) 7.5-325 MG per tablet, Take 1 tablet by mouth Every 6 (Six) Hours As Needed for Moderate Pain ., Disp: , Rfl:   •  isosorbide mononitrate (IMDUR) 60 MG 24 hr tablet, Take 1 tablet by mouth Daily. (Patient taking differently: Take 60 mg by mouth Daily.), Disp: 30 tablet, Rfl: 11  •  levothyroxine (SYNTHROID, LEVOTHROID) 150 MCG tablet, Take 1 tablet by  mouth Daily., Disp: 90 tablet, Rfl: 2  •  losartan (COZAAR) 50 MG tablet, Take 1 tablet by mouth Daily., Disp: 90 tablet, Rfl: 2  •  Multiple Vitamins-Minerals (CENTRUM SILVER PO), Take 1 tablet by mouth Daily., Disp: , Rfl:   •  pravastatin (PRAVACHOL) 40 MG tablet, Take 2 tablets by mouth Daily. (Patient taking differently: Take 80 mg by mouth Every Night.), Disp: 90 tablet, Rfl: 3  •  sertraline (ZOLOFT) 100 MG tablet, TAKE ONE TABLET BY MOUTH DAILY *MUST MAKE APPOINTMENT FOR FURTHER REFILLS, Disp: 30 tablet, Rfl: 0  •  travoprost, PAUL free, (TRAVATAN) 0.004 % solution ophthalmic solution, 1 drop every evening. in affected eye(s), Disp: , Rfl:   Allergies   Allergen Reactions   • Statins Myalgia     Social History     Social History   • Marital status:      Spouse name: N/A   • Number of children: 0   • Years of education: N/A     Occupational History   •       Social History Main Topics   • Smoking status: Never Smoker   • Smokeless tobacco: Former User     Types: Chew, Snuff     Quit date: 01/2002   • Alcohol use 7.2 oz/week     12 Cans of beer per week      Comment: occas   • Drug use: No   • Sexual activity: Defer     Other Topics Concern   • Not on file     Social History Narrative    The patient lives with his wife in [place].     Family History   Problem Relation Age of Onset   • Heart disease Mother    • Heart failure Mother    • Pneumonia Father      Review of Systems   Constitution: Negative for chills, fever, malaise/fatigue, night sweats and weight loss.   HENT: Negative for hearing loss, odynophagia and sore throat.    Cardiovascular: Positive for palpitations. Negative for chest pain, dyspnea on exertion, leg swelling and orthopnea.   Respiratory: Negative for cough and hemoptysis.    Endocrine: Negative for cold intolerance, heat intolerance, polydipsia, polyphagia and polyuria.   Hematologic/Lymphatic: Does not bruise/bleed easily.   Skin: Negative for itching and rash.  "  Musculoskeletal: Negative for joint pain, joint swelling and myalgias.   Gastrointestinal: Negative for abdominal pain, constipation, diarrhea, hematemesis, hematochezia, melena, nausea and vomiting.   Genitourinary: Negative for dysuria, frequency and hematuria.   Neurological: Negative for focal weakness, headaches, numbness and seizures.   Psychiatric/Behavioral: Negative for suicidal ideas.   All other systems reviewed and are negative.    Vitals:    06/20/18 0954   BP: 120/70   BP Location: Left arm   Patient Position: Sitting   Pulse: (!) 44   Temp: 97.3 °F (36.3 °C)   SpO2: 96%   Weight: 76.2 kg (168 lb)   Height: 175.3 cm (69\")      Physical Exam:  Gen - NAD, pleasant, cooperative  CV - Regular rate and rhythm, no murmur gallop or rub  Pulm - Lungs clear to auscultation without wheeze or rhonchi   GI - Soft, normoactive bowel sounds, non-tender  Ext - Without edema  Skin - Right anterior chest wall hematoma 6x7cm site is warm and soft with fluctuant fluid beneath the skin  Incision - Healing well, skin staples intact, no evidence of incisional dehiscence or cellulitis    Assessment/Plan:  Patient is a 68-year-old  male with history of anterior chest wall hematoma secondary to trauma status post anterior chest wall incision and drainage with antibiotic pulsatile irrigation performed 6/8/18.  Mr. Tapia presents to office for short interval follow-up to evaluate right chest incision for possible infection.  The patient denies any fever or chills, and upon physical examination the patient's anterior chest wall is within normal limits.  It does appear that he has some type of arterial communication to this anterior chest wall hematoma, as it slowly increases in size, and is warm and fluctuant filling with anticoagulated blood.  He continues to be on his Plavix, but will be seen by Dr. Ribeiro this Friday, and hopes to discontinue this medication at that time.  The patient has a scheduled appointment " with Dr. Gutierrez on 7/5/18, where his incisional staples will be removed.  We will be able to reevaluate the incision and area at that time to see if it is enlarging in size.  Should the patient have acutely worsening symptoms or develop fever, chills or bloody/purulent discharge from this area, he should call our office or present to the emergency department immediately.  If he has any questions or concerns regarding his operation, he may call our office at any time.     Patient Active Problem List   Diagnosis   • Hyperlipidemia   • Hypertension   • Hypothyroidism   • Irritable bowel syndrome   • Sleep apnea   • Generalized anxiety disorder   • Inguinal hernia   • Herpes simplex infection   • Coronary artery disease involving native coronary artery of native heart with unstable angina pectoris   • Hematoma of right chest wall   • Frequent unifocal PVCs     Signed by:

## 2018-06-22 ENCOUNTER — OFFICE VISIT (OUTPATIENT)
Dept: CARDIOLOGY | Facility: CLINIC | Age: 68
End: 2018-06-22

## 2018-06-22 VITALS
WEIGHT: 168 LBS | SYSTOLIC BLOOD PRESSURE: 128 MMHG | DIASTOLIC BLOOD PRESSURE: 78 MMHG | HEIGHT: 69 IN | HEART RATE: 48 BPM | BODY MASS INDEX: 24.88 KG/M2

## 2018-06-22 DIAGNOSIS — I25.10 CORONARY ARTERY DISEASE INVOLVING NATIVE CORONARY ARTERY OF NATIVE HEART WITHOUT ANGINA PECTORIS: Primary | ICD-10-CM

## 2018-06-22 DIAGNOSIS — I10 ESSENTIAL HYPERTENSION: ICD-10-CM

## 2018-06-22 DIAGNOSIS — E78.2 MIXED HYPERLIPIDEMIA: ICD-10-CM

## 2018-06-22 DIAGNOSIS — I49.3 FREQUENT UNIFOCAL PVCS: ICD-10-CM

## 2018-06-22 PROCEDURE — 99213 OFFICE O/P EST LOW 20 MIN: CPT | Performed by: INTERNAL MEDICINE

## 2018-06-22 NOTE — PROGRESS NOTES
Encounter Date:06/22/2018      Patient ID: Enio Tapia is a 68 y.o. male.    Chief Complaint: Coronary Artery Disease and Frequent unifocal PVCs      PROBLEM LIST:  1. Coronary artery disease  a. As/P3 0.5 x 32 Taxus JOHANA mid RCA lesion 10/15/04  b. Normal stress echo Feb 2010  c. Echo February 2010 showed normal EF of 60% with trace TR and MR.  usman. C 3/31/17For unstable angina: Diffuse 70% stenosis of mid LAD noted.  Stented with Xience 3.0 x 33 JOHANA reducing stenosis to 0%.  Also 70% discrete stenosis in proximal first diagonal stented with Xience Alpine 2.25 x 15 JOHANA reducing stenosis to 0%.  Previous stenting proximal to mid RCA widely patent.  Normal LVEF.  e. Echo 3/31/17: EF 55%.  Mild LVH noted.  2. Palpitations    a. 24-hour Holter monitor 5/25/18: Sinus rhythm with rare APCs.  Very frequent PVC.  b. Seen by electrophysiology, PVC ablation and consideration.  3. Hypertension  4. Hyperlipidemia  5. Hypothyroidism  6. Anxiety disorder  7. Glaucoma  8. Osteoarthritis  9. GRABIEL with CPAP compliance.    History of Present Illness  Patient presents today for follow-up with a history of CAD and cardiac risk factors. Since last visit had surgery for removal of a hematoma on the rigtht side of his chest. Reports that there is mild swelling and tenderness at the surgery site. Denies chest pain, shortness of breath, leg swelling, palpitations, and syncope. Remains busy and active with no limitations.  He was taken off Plavix possibly knife he should restart.    Allergies   Allergen Reactions   • Statins Myalgia         Current Outpatient Prescriptions:   •  acyclovir (ZOVIRAX) 400 MG tablet, TAKE ONE TABLET BY MOUTH DAILY, Disp: 30 tablet, Rfl: 3  •  amLODIPine (NORVASC) 5 MG tablet, Take 1 tablet by mouth Daily. (Patient taking differently: Take 5 mg by mouth Daily.), Disp: 30 tablet, Rfl: 11  •  aspirin 81 MG EC tablet, Take 81 mg by mouth Daily., Disp: , Rfl:   •  carvedilol (COREG) 3.125 MG tablet, TAKE  ONE TABLET BY MOUTH TWICE A DAY WITH MEALS, Disp: 60 tablet, Rfl: 5  •  clopidogrel (PLAVIX) 75 MG tablet, Take 1 tablet by mouth Daily., Disp: 30 tablet, Rfl: 1  •  HYDROcodone-acetaminophen (NORCO) 7.5-325 MG per tablet, Take 1 tablet by mouth Every 6 (Six) Hours As Needed for Moderate Pain ., Disp: , Rfl:   •  isosorbide mononitrate (IMDUR) 60 MG 24 hr tablet, Take 1 tablet by mouth Daily. (Patient taking differently: Take 60 mg by mouth Daily.), Disp: 30 tablet, Rfl: 11  •  levothyroxine (SYNTHROID, LEVOTHROID) 150 MCG tablet, Take 1 tablet by mouth Daily., Disp: 90 tablet, Rfl: 2  •  losartan (COZAAR) 50 MG tablet, Take 1 tablet by mouth Daily., Disp: 90 tablet, Rfl: 2  •  Multiple Vitamins-Minerals (CENTRUM SILVER PO), Take 1 tablet by mouth Daily., Disp: , Rfl:   •  pravastatin (PRAVACHOL) 40 MG tablet, Take 2 tablets by mouth Daily. (Patient taking differently: Take 80 mg by mouth Every Night.), Disp: 90 tablet, Rfl: 3  •  sertraline (ZOLOFT) 100 MG tablet, TAKE ONE TABLET BY MOUTH DAILY *MUST MAKE APPOINTMENT FOR FURTHER REFILLS, Disp: 30 tablet, Rfl: 0  •  travoprost, BAK free, (TRAVATAN) 0.004 % solution ophthalmic solution, 1 drop every evening. in affected eye(s), Disp: , Rfl:     The following portions of the patient's history were reviewed and updated as appropriate: allergies, current medications, past family history, past medical history, past social history, past surgical history and problem list.    ROS  Review of Systems   Constitution: Negative for chills, fever, weight gain and weight loss.   Cardiovascular: Negative for chest pain, claudication, dyspnea on exertion, leg swelling, orthopnea, palpitations, paroxysmal nocturnal dyspnea and syncope.        No dizziness   Gastrointestinal: Negative for abdominal pain, constipation, diarrhea, nausea and vomiting.   Genitourinary:        No urinary symptoms   Neurological:        No symptoms of stroke.   All other systems reviewed and are  "negative.    Objective:     Blood pressure 128/78, pulse (!) 48, height 175.3 cm (69\"), weight 76.2 kg (168 lb).      Physical Exam  Constitutional: She appears well-developed and well-nourished.   HENT:   HEENT exam unremarkable.   Neck: Neck supple. No JVD present.   No carotid bruits.   Cardiovascular: Normal rate, regular rhythm and normal heart sounds.    No murmur heard.  2 plus symmetric pulses.   Pulmonary/Chest: Breath sounds normal. Does not exhibit tenderness.   Abdominal:   Abdomen benign.   Musculoskeletal: Does not exhibit edema.   Neurological:   Neurological exam unremarkable.   Vitals reviewed.    Lab Review:   Procedures       Assessment:      Diagnosis Plan   1. Coronary artery disease involving native coronary artery of native heart without angina pectoris  Stable, continue current medications.     2. Frequent unifocal PVCs  Continue carvedilol, pending PVC ablation.     3. Essential hypertension  Controlled, continue current medications.     4. Mixed hyperlipidemia  Continue current dose of pravastatin.       Plan:   Due to previous paclitaxel stent and risk of very late stent thrombosis I have recommended that he should restart Plavix after his neck surgery visit and when okay with them.    Overall cardiac status is stable.    Continue current medications.   FU in 6 MO, sooner as needed.  Thank you for allowing us to participate in the care of your patient.     Scribed for Candy Ribeiro MD by You Mcmanus. 6/22/2018  12:45 PM    ICandy MD, personally performed the services described in this documentation as scribed by the above named individual in my presence, and it is both accurate and complete.  6/22/2018  12:46 PM        Please note that portions of this note may have been completed with a voice recognition program. Efforts were made to edit the dictations, but occasionally words are mistranscribed.        "

## 2018-06-29 ENCOUNTER — APPOINTMENT (OUTPATIENT)
Dept: PREADMISSION TESTING | Facility: HOSPITAL | Age: 68
End: 2018-06-29

## 2018-06-29 ENCOUNTER — OFFICE VISIT (OUTPATIENT)
Dept: CARDIOLOGY | Facility: CLINIC | Age: 68
End: 2018-06-29

## 2018-06-29 ENCOUNTER — PREP FOR SURGERY (OUTPATIENT)
Dept: OTHER | Facility: HOSPITAL | Age: 68
End: 2018-06-29

## 2018-06-29 VITALS
HEART RATE: 60 BPM | SYSTOLIC BLOOD PRESSURE: 120 MMHG | DIASTOLIC BLOOD PRESSURE: 78 MMHG | WEIGHT: 168 LBS | HEIGHT: 69 IN | BODY MASS INDEX: 24.88 KG/M2

## 2018-06-29 DIAGNOSIS — I49.3 FREQUENT UNIFOCAL PVCS: Primary | ICD-10-CM

## 2018-06-29 DIAGNOSIS — I49.3 FREQUENT UNIFOCAL PVCS: ICD-10-CM

## 2018-06-29 DIAGNOSIS — R00.1 BRADYCARDIA: ICD-10-CM

## 2018-06-29 DIAGNOSIS — I49.3 FREQUENT PVCS: Primary | ICD-10-CM

## 2018-06-29 LAB
ALBUMIN SERPL-MCNC: 4.22 G/DL (ref 3.2–4.8)
ALBUMIN/GLOB SERPL: 1.8 G/DL (ref 1.5–2.5)
ALP SERPL-CCNC: 66 U/L (ref 25–100)
ALT SERPL W P-5'-P-CCNC: 28 U/L (ref 7–40)
ANION GAP SERPL CALCULATED.3IONS-SCNC: 5 MMOL/L (ref 3–11)
AST SERPL-CCNC: 25 U/L (ref 0–33)
BILIRUB SERPL-MCNC: 0.6 MG/DL (ref 0.3–1.2)
BUN BLD-MCNC: 12 MG/DL (ref 9–23)
BUN/CREAT SERPL: 12.9 (ref 7–25)
CALCIUM SPEC-SCNC: 9.1 MG/DL (ref 8.7–10.4)
CHLORIDE SERPL-SCNC: 107 MMOL/L (ref 99–109)
CO2 SERPL-SCNC: 29 MMOL/L (ref 20–31)
CREAT BLD-MCNC: 0.93 MG/DL (ref 0.6–1.3)
DEPRECATED RDW RBC AUTO: 44.5 FL (ref 37–54)
ERYTHROCYTE [DISTWIDTH] IN BLOOD BY AUTOMATED COUNT: 13 % (ref 11.3–14.5)
GFR SERPL CREATININE-BSD FRML MDRD: 81 ML/MIN/1.73
GLOBULIN UR ELPH-MCNC: 2.4 GM/DL
GLUCOSE BLD-MCNC: 97 MG/DL (ref 70–100)
HBA1C MFR BLD: 5 % (ref 4.8–5.6)
HCT VFR BLD AUTO: 39.7 % (ref 38.9–50.9)
HGB BLD-MCNC: 13.5 G/DL (ref 13.1–17.5)
INR PPP: 1.09 (ref 0.91–1.09)
MCH RBC QN AUTO: 32 PG (ref 27–31)
MCHC RBC AUTO-ENTMCNC: 34 G/DL (ref 32–36)
MCV RBC AUTO: 94.1 FL (ref 80–99)
PLATELET # BLD AUTO: 149 10*3/MM3 (ref 150–450)
PMV BLD AUTO: 9.2 FL (ref 6–12)
POTASSIUM BLD-SCNC: 4.7 MMOL/L (ref 3.5–5.5)
PROT SERPL-MCNC: 6.6 G/DL (ref 5.7–8.2)
PROTHROMBIN TIME: 11.4 SECONDS (ref 9.6–11.5)
RBC # BLD AUTO: 4.22 10*6/MM3 (ref 4.2–5.76)
SODIUM BLD-SCNC: 141 MMOL/L (ref 132–146)
WBC NRBC COR # BLD: 4.35 10*3/MM3 (ref 3.5–10.8)

## 2018-06-29 PROCEDURE — 93000 ELECTROCARDIOGRAM COMPLETE: CPT | Performed by: INTERNAL MEDICINE

## 2018-06-29 PROCEDURE — 99214 OFFICE O/P EST MOD 30 MIN: CPT | Performed by: INTERNAL MEDICINE

## 2018-06-29 PROCEDURE — 80053 COMPREHEN METABOLIC PANEL: CPT | Performed by: INTERNAL MEDICINE

## 2018-06-29 PROCEDURE — 36415 COLL VENOUS BLD VENIPUNCTURE: CPT

## 2018-06-29 PROCEDURE — 85027 COMPLETE CBC AUTOMATED: CPT | Performed by: INTERNAL MEDICINE

## 2018-06-29 PROCEDURE — 85610 PROTHROMBIN TIME: CPT | Performed by: INTERNAL MEDICINE

## 2018-06-29 PROCEDURE — 83036 HEMOGLOBIN GLYCOSYLATED A1C: CPT | Performed by: INTERNAL MEDICINE

## 2018-06-29 RX ORDER — NITROGLYCERIN 0.4 MG/1
0.4 TABLET SUBLINGUAL
Status: CANCELLED | OUTPATIENT
Start: 2018-06-29

## 2018-06-29 RX ORDER — ACETAMINOPHEN 325 MG/1
650 TABLET ORAL EVERY 4 HOURS PRN
Status: CANCELLED | OUTPATIENT
Start: 2018-06-29

## 2018-06-29 RX ORDER — SERTRALINE HYDROCHLORIDE 100 MG/1
TABLET, FILM COATED ORAL
Qty: 30 TABLET | Refills: 0 | Status: SHIPPED | OUTPATIENT
Start: 2018-06-29 | End: 2018-07-26 | Stop reason: SDUPTHER

## 2018-06-29 RX ORDER — PROMETHAZINE HYDROCHLORIDE 25 MG/ML
12.5 INJECTION, SOLUTION INTRAMUSCULAR; INTRAVENOUS EVERY 4 HOURS PRN
Status: CANCELLED | OUTPATIENT
Start: 2018-06-29

## 2018-06-29 RX ORDER — SODIUM CHLORIDE 0.9 % (FLUSH) 0.9 %
1-10 SYRINGE (ML) INJECTION AS NEEDED
Status: CANCELLED | OUTPATIENT
Start: 2018-06-29

## 2018-06-29 NOTE — PROGRESS NOTES
Enio Tapia  1950  PCP: Troy Mckay MD    SUBJECTIVE:   Enio Tapia is a 68 y.o. male seen for a consultation visit regarding the following:     Chief Complaint:   Chief Complaint   Patient presents with   • Coronary Artery Disease          HPI:   Ongoing PVCs and palpitations. Increased weakness/fatigue as a result    History:  This is a 68-year-old patient followed by Dr. Ribeiro.  He has a history of extensive coronary artery disease.  He most recently has found to have frequent ventricular ectopy for which he has been having increased shortness of breath and fatigue as a result. He was bite by a horse on May 19th, 2018. There was severe chest trauma from the bite. He noted that palp/pvcs, after have the horse bite trauma.  He's had extensive chest trauma that is also adding to his issues.        Cardiac PMH: (Old records have been reviewed and summarized below)  1. Coronary artery disease  a. As/P3 0.5 x 32 Taxus JOHANA mid RCA lesion 10/15/04  b. Normal stress echo Feb 2010  c. Echo February 2010 showed normal EF of 60% with trace TR and MR. mary. C 3/31/17For unstable angina: Diffuse 70% stenosis of mid LAD noted.  Stented with Xience 3.0 x 33 JOHANA reducing stenosis to 0%.  Also 70% discrete stenosis in proximal first diagonal stented with Xience Alpine 2.25 x 15 JOHANA reducing stenosis to 0%.  Previous stenting proximal to mid RCA widely patent.  Normal LVEF.  e. Echo 3/31/17: EF 55%.  Mild LVH noted.  2. Hypertension  3. Hyperlipidemia  4. Hypothyroidism  5. Anxiety disorder  6. Glaucoma  7. Osteoarthritis  8. GRABIEL with CPAP compliance.  9. 24 Hr Holter - May 2018 - Min 61, Mean 70, Max 108 - 25,091 PVCs - Mostly [place]      Past Medical History, Past Surgical History, Family history, Social History, and Medications were all reviewed with the patient today and updated as necessary.     Current Outpatient Prescriptions   Medication Sig Dispense Refill   • acyclovir (ZOVIRAX) 400 MG tablet TAKE ONE  TABLET BY MOUTH DAILY 30 tablet 3   • amLODIPine (NORVASC) 5 MG tablet Take 1 tablet by mouth Daily. (Patient taking differently: Take 5 mg by mouth Daily.) 30 tablet 11   • aspirin 81 MG EC tablet Take 81 mg by mouth Daily.     • carvedilol (COREG) 3.125 MG tablet TAKE ONE TABLET BY MOUTH TWICE A DAY WITH MEALS 60 tablet 5   • clopidogrel (PLAVIX) 75 MG tablet Take 1 tablet by mouth Daily. 30 tablet 1   • HYDROcodone-acetaminophen (NORCO) 7.5-325 MG per tablet Take 1 tablet by mouth Every 6 (Six) Hours As Needed for Moderate Pain .     • isosorbide mononitrate (IMDUR) 60 MG 24 hr tablet Take 1 tablet by mouth Daily. (Patient taking differently: Take 60 mg by mouth Daily.) 30 tablet 11   • levothyroxine (SYNTHROID, LEVOTHROID) 150 MCG tablet Take 1 tablet by mouth Daily. 90 tablet 2   • losartan (COZAAR) 50 MG tablet Take 1 tablet by mouth Daily. 90 tablet 2   • Multiple Vitamins-Minerals (CENTRUM SILVER PO) Take 1 tablet by mouth Daily.     • pravastatin (PRAVACHOL) 40 MG tablet Take 2 tablets by mouth Daily. (Patient taking differently: Take 80 mg by mouth Every Night.) 90 tablet 3   • travoprost, BAK free, (TRAVATAN) 0.004 % solution ophthalmic solution 1 drop every evening. in affected eye(s)     • sertraline (ZOLOFT) 100 MG tablet TAKE 1 TABLET BY MOUTH DAILY 30 tablet 0     No current facility-administered medications for this visit.      Allergies   Allergen Reactions   • Statins Myalgia         Past Medical History:   Diagnosis Date   • Arthritis    • Chest wall hematoma    • Coronary artery disease involving native coronary artery of native heart with unstable angina pectoris 3/31/2017   • Depression    • Disease of thyroid gland    • Enlarged prostate    • Frequent PVCs    • Glaucoma    • Hematoma     right chest   • Hyperlipidemia    • Hypertension    • GRABIEL on CPAP      Past Surgical History:   Procedure Laterality Date   • CARDIAC CATHETERIZATION     • CARDIAC CATHETERIZATION N/A 3/31/2017    Procedure:  "Left Heart Cath;  Surgeon: Enio Benavidez MD;  Location:  [facility];  Service:    • CATARACT EXTRACTION Bilateral    • COLONOSCOPY     • CORONARY STENT PLACEMENT  10/16/2004, 2017   • INCISION AND DRAINAGE HEMATOMA  06/08/2018    Chest Wall   • INCISION AND DRAINAGE LEG Right 6/8/2018    Procedure: INCISION AND DRAINAGE RIGHT TRUNK HEMATOMA;  Surgeon: Gerardo Gutierrez MD;  Location:  [facility];  Service: Cardiothoracic   • TONSILLECTOMY     • VASECTOMY  08/1998     Family History   Problem Relation Age of Onset   • Heart disease Mother    • Heart failure Mother    • Pneumonia Father      Social History   Substance Use Topics   • Smoking status: Never Smoker   • Smokeless tobacco: Former User     Types: Chew, Snuff     Quit date: 01/2002   • Alcohol use 7.2 oz/week     12 Cans of beer per week      Comment: occas       ROS:  Review of Symptoms:  General: no recent weight loss/gain, weakness or fatigue  Skin: no rashes, lumps, or other skin changes  HEENT: no dizziness, lightheadedness, or vision changes  Respiratory: no cough or hemoptysis  Cardiovascular: + palpitations, and tachycardia  Gastrointestinal: no black/tarry stools or diarrhea  Urinary: no change in frequency or urgency  Peripheral Vascular: no claudication or leg cramps  Musculoskeletal: no muscle or joint pain/stiffness  Psychiatric: no depression or excessive stress  Neurological: no sensory or motor loss, no syncope  Hematologic: no anemia, easy bruising or bleeding  Endocrine: no thyroid problems, nor heat or cold intolerance       PHYSICAL EXAM:   /78 (BP Location: Left arm, Patient Position: Sitting)   Pulse 60   Ht 175.3 cm (69\")   Wt 76.2 kg (168 lb)   BMI 24.81 kg/m²      Wt Readings from Last 5 Encounters:   06/29/18 76.2 kg (168 lb)   06/22/18 76.2 kg (168 lb)   06/20/18 76.2 kg (168 lb)   06/10/18 78.7 kg (173 lb 9.6 oz)   06/07/18 77.7 kg (171 lb 6.4 oz)     BP Readings from Last 5 Encounters:   06/29/18 120/78 "   06/22/18 128/78   06/20/18 120/70   06/11/18 101/65   06/07/18 124/66       General-Well Nourished, Well developed  Eyes - PERRLA  Neck- supple, No mass  CV- regular rate and rhythm, no MRG  Lung- clear bilaterally  Abd- soft, +BS  Musc/skel - Norm strength and range of motion  Skin- warm and dry  Neuro - Alert & Oriented x 3, appropriate mood.    Medical problems and test results were reviewed with the patient today.     Results for orders placed or performed during the hospital encounter of 06/08/18   Anaerobic Culture - Body Fluid, Chest, Right   Result Value Ref Range    Culture Scant growth (1+) Cutibacterium acnes (A)    Fungus Culture - Body Fluid, Chest, Right   Result Value Ref Range    Fungus Culture No fungus isolated at 3 weeks    Body Fluid Culture - Body Fluid, Chest, Right   Result Value Ref Range    BF Culture No growth at 4 days     Gram Stain Result Few (2+) WBCs seen     Gram Stain Result No organisms seen    AFB Culture - Body Fluid, Chest, Right   Result Value Ref Range    AFB Culture No AFB isolated at 3 weeks     AFB Stain No acid fast bacilli seen on concentrated smear    Basic Metabolic Panel   Result Value Ref Range    Glucose 110 (H) 70 - 100 mg/dL    BUN 19 9 - 23 mg/dL    Creatinine 0.86 0.60 - 1.30 mg/dL    Sodium 137 132 - 146 mmol/L    Potassium 4.3 3.5 - 5.5 mmol/L    Chloride 104 99 - 109 mmol/L    CO2 27.0 20.0 - 31.0 mmol/L    Calcium 8.5 (L) 8.7 - 10.4 mg/dL    eGFR Non African Amer 88 >60 mL/min/1.73    BUN/Creatinine Ratio 22.1 7.0 - 25.0    Anion Gap 6.0 3.0 - 11.0 mmol/L   CBC Auto Differential   Result Value Ref Range    WBC 5.03 3.50 - 10.80 10*3/mm3    RBC 3.20 (L) 4.20 - 5.76 10*6/mm3    Hemoglobin 10.4 (L) 13.1 - 17.5 g/dL    Hematocrit 30.6 (L) 38.9 - 50.9 %    MCV 95.6 80.0 - 99.0 fL    MCH 32.5 (H) 27.0 - 31.0 pg    MCHC 34.0 32.0 - 36.0 g/dL    RDW 14.5 11.3 - 14.5 %    RDW-SD 51.1 37.0 - 54.0 fl    MPV 9.2 6.0 - 12.0 fL    Platelets 185 150 - 450 10*3/mm3     Neutrophil % 77.1 (H) 41.0 - 71.0 %    Lymphocyte % 15.7 (L) 24.0 - 44.0 %    Monocyte % 6.6 0.0 - 12.0 %    Eosinophil % 0.4 0.0 - 3.0 %    Basophil % 0.2 0.0 - 1.0 %    Immature Grans % 0.2 0.0 - 0.6 %    Neutrophils, Absolute 3.88 1.50 - 8.30 10*3/mm3    Lymphocytes, Absolute 0.79 0.60 - 4.80 10*3/mm3    Monocytes, Absolute 0.33 0.00 - 1.00 10*3/mm3    Eosinophils, Absolute 0.02 0.00 - 0.30 10*3/mm3    Basophils, Absolute 0.01 0.00 - 0.20 10*3/mm3    Immature Grans, Absolute 0.01 0.00 - 0.03 10*3/mm3   Prepare RBC, 2 Units   Result Value Ref Range    Product Code M7662T73     Unit Number [id number]     UNIT  ABO A     UNIT  RH POS     Dispense Status RE     Blood Type APOS     Blood Expiration Date 201806132359     Blood Type Barcode 6200     Product Code R5538M55     Unit Number [id number]     UNIT  ABO A     UNIT  RH POS     Dispense Status RE     Blood Type APOS     Blood Expiration Date 201806132359     Blood Type Barcode 6200    ABO/Rh Specimen Verification   Result Value Ref Range    ABO Type A     RH type Positive          Lab Results   Component Value Date    CHOL 172 05/25/2018    HDL 53 05/25/2018     05/25/2018       EKG:  (EKG/Tracing has been independently visualized by me and summarized below)  NSR, RVOT PVCs      ECG 12 Lead  Date/Time: 6/29/2018 10:38 AM  Performed by: RUBEN SAAVEDRA  Authorized by: RUBEN SAAVEDRA   Previous ECG: no previous ECG available  Rhythm: sinus rhythm  Ectopy: PVCs  Rate: normal  BPM: 60  Conduction: conduction normal  ST Segments: ST segments normal  T Waves: T waves normal  Clinical impression: abnormal ECG            ASSESSMENT and PLAN  1.  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  We discussed with him therapy options, because of his extensive coronary artery disease, bradycardia and lung disease antiarrhythmic options are limited.  We felt that the best option would  be for catheter-based ablation.  We discussed potential Electrophysiology Study with possible ablation.  I described the procedures in detail including risks, alternatives, and benefits.  We also discussed that risks include bleeding, vascular damage, stroke, MI, esophageal damage, cardiac perforation, and even death.  2.  Bradycardia-We are hopeful that his sinus node function will improved post ablation of the ventricular ectopy      Return for after procedure.            Brandon Parker M.D., F.CARLOS ALBERTO.C.C, F.H.R.S.  Cardiology/Electrophysiology  06/29/18  11:04 AM

## 2018-07-05 ENCOUNTER — OFFICE VISIT (OUTPATIENT)
Dept: CARDIAC SURGERY | Facility: CLINIC | Age: 68
End: 2018-07-05

## 2018-07-05 ENCOUNTER — DOCUMENTATION (OUTPATIENT)
Dept: CARDIAC SURGERY | Facility: CLINIC | Age: 68
End: 2018-07-05

## 2018-07-05 VITALS
TEMPERATURE: 97.9 F | SYSTOLIC BLOOD PRESSURE: 106 MMHG | BODY MASS INDEX: 25.12 KG/M2 | WEIGHT: 169.6 LBS | DIASTOLIC BLOOD PRESSURE: 71 MMHG | HEART RATE: 66 BPM | OXYGEN SATURATION: 99 % | HEIGHT: 69 IN
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
## 2018-07-05 DIAGNOSIS — S20.211D HEMATOMA OF RIGHT CHEST WALL, SUBSEQUENT ENCOUNTER: Primary | ICD-10-CM

## 2018-07-05 PROCEDURE — 99213 OFFICE O/P EST LOW 20 MIN: CPT | Performed by: THORACIC SURGERY (CARDIOTHORACIC VASCULAR SURGERY)

## 2018-07-05 NOTE — PROGRESS NOTES
07/05/2018  Patient Information  [facility]                                                                                          [address] [place] [address]   1950  'PCP/Referring Physician'  Troy Mckay MD  [phone]  No ref. provider found    Chief Complaint   Patient presents with   • Follow-up     2 week follow up per Yoni. Patient complains of neck pain, and soreness in incision area     CC: My neck is hurting particularly when I turned my head to the right  History of Present Illness: 68-year-old  male now 1 month postoperative native of a hematoma of the anterior chest wall secondary to trauma from a horse bite.  The patient is doing well.  He denies fever, chills, and night sweats.  The wound is healing.  There is still some swelling at the upper aspect of the wound edema or drainage.  The patient's major complaint at this point in time is pain in his neck and right shoulder.      Patient Active Problem List   Diagnosis   • Hyperlipidemia   • Hypertension   • Hypothyroidism   • Irritable bowel syndrome   • Sleep apnea   • Generalized anxiety disorder   • Inguinal hernia   • Herpes simplex infection   • Coronary artery disease involving native coronary artery of native heart with unstable angina pectoris (CMS/HCC)   • Hematoma of right chest wall   • Frequent unifocal PVCs     Past Medical History:   Diagnosis Date   • Arthritis    • BPH (benign prostatic hyperplasia)    • Chest wall hematoma    • Coronary artery disease involving native coronary artery of native heart with unstable angina pectoris (CMS/HCC) 3/31/2017   • Depression    • Disease of thyroid gland    • Enlarged prostate    • Frequent PVCs    • Glaucoma    • Hematoma     right chest   • [place] (hard of hearing)    • Hyperlipidemia    • Hypertension    • GRABIEL on CPAP      Past Surgical History:   Procedure Laterality Date   • CARDIAC CATHETERIZATION     • CARDIAC CATHETERIZATION N/A 3/31/2017     Procedure: Left Heart Cath;  Surgeon: Enio Benavidez MD;  [facility]:  [facility];  Service:    • CATARACT EXTRACTION Bilateral    • COLONOSCOPY     • CORONARY STENT PLACEMENT  10/16/2004, 2017   • INCISION AND DRAINAGE HEMATOMA  06/08/2018    Chest Wall-right side    • INCISION AND DRAINAGE LEG Right 6/8/2018    Procedure: INCISION AND DRAINAGE RIGHT TRUNK HEMATOMA;  Surgeon: Gerardo Gutierrez MD;  Location:  [facility];  Service: Cardiothoracic   • TONSILLECTOMY     • VASECTOMY  08/1998       Current Outpatient Prescriptions:   •  acyclovir (ZOVIRAX) 400 MG tablet, TAKE ONE TABLET BY MOUTH DAILY, Disp: 30 tablet, Rfl: 3  •  amLODIPine (NORVASC) 5 MG tablet, Take 1 tablet by mouth Daily. (Patient taking differently: Take 5 mg by mouth Daily.), Disp: 30 tablet, Rfl: 11  •  aspirin 81 MG EC tablet, Take 81 mg by mouth Daily., Disp: , Rfl:   •  carvedilol (COREG) 3.125 MG tablet, TAKE ONE TABLET BY MOUTH TWICE A DAY WITH MEALS, Disp: 60 tablet, Rfl: 5  •  clopidogrel (PLAVIX) 75 MG tablet, Take 1 tablet by mouth Daily., Disp: 30 tablet, Rfl: 1  •  HYDROcodone-acetaminophen (NORCO) 7.5-325 MG per tablet, Take 1 tablet by mouth Every 6 (Six) Hours As Needed for Moderate Pain ., Disp: , Rfl:   •  isosorbide mononitrate (IMDUR) 60 MG 24 hr tablet, Take 1 tablet by mouth Daily. (Patient taking differently: Take 60 mg by mouth Daily.), Disp: 30 tablet, Rfl: 11  •  levothyroxine (SYNTHROID, LEVOTHROID) 150 MCG tablet, Take 1 tablet by mouth Daily., Disp: 90 tablet, Rfl: 2  •  losartan (COZAAR) 50 MG tablet, Take 1 tablet by mouth Daily., Disp: 90 tablet, Rfl: 2  •  Multiple Vitamins-Minerals (CENTRUM SILVER PO), Take 1 tablet by mouth Daily., Disp: , Rfl:   •  pravastatin (PRAVACHOL) 40 MG tablet, Take 2 tablets by mouth Daily. (Patient taking differently: Take 80 mg by mouth Every Night.), Disp: 90 tablet, Rfl: 3  •  sertraline (ZOLOFT) 100 MG tablet, TAKE 1 TABLET BY MOUTH DAILY, Disp: 30 tablet, Rfl: 0  •   travoprost, PAUL free, (TRAVATAN) 0.004 % solution ophthalmic solution, 1 drop every evening. in affected eye(s), Disp: , Rfl:   Allergies   Allergen Reactions   • Statins Myalgia     Social History     Social History   • Marital status:      Spouse name: N/A   • Number of children: 0   • Years of education: N/A     Occupational History   •       Social History Main Topics   • Smoking status: Never Smoker   • Smokeless tobacco: Former User     Types: Chew, Snuff     Quit date: 01/2002   • Alcohol use 7.2 oz/week     12 Cans of beer per week      Comment: occas   • Drug use: No   • Sexual activity: Defer     Other Topics Concern   • Not on file     Social History Narrative    The patient lives with his wife in [place].     Family History   Problem Relation Age of Onset   • Heart disease Mother    • Heart failure Mother    • Pneumonia Father      Review of Systems   Constitution: Negative for chills, fever, malaise/fatigue, night sweats and weight loss.   HENT: Negative for hearing loss, odynophagia and sore throat.    Eyes: Negative for blurred vision, vision loss in left eye, vision loss in right eye and visual disturbance.   Cardiovascular: Negative for chest pain, dyspnea on exertion, leg swelling, orthopnea and palpitations.   Respiratory: Negative for cough, hemoptysis, shortness of breath and wheezing.    Endocrine: Negative for cold intolerance, heat intolerance, polydipsia, polyphagia and polyuria.   Hematologic/Lymphatic: Does not bruise/bleed easily.   Skin: Negative for itching and rash.   Musculoskeletal: Positive for muscle cramps, myalgias, neck pain and stiffness. Negative for joint pain and joint swelling.        My neck hurts particularly if I turn my head to the right   Gastrointestinal: Negative for abdominal pain, constipation, diarrhea, hematemesis, hematochezia, melena, nausea and vomiting.   Genitourinary: Negative for dysuria, frequency and hematuria.   Neurological:  "Negative for focal weakness, headaches, numbness and seizures.   Psychiatric/Behavioral: Negative for altered mental status and suicidal ideas. The patient is not nervous/anxious.    All other systems reviewed and are negative.    Vitals:    07/05/18 1153   BP: 106/71   BP Location: Right arm   Patient Position: Sitting   Pulse: 66   Temp: 97.9 °F (36.6 °C)   SpO2: 99%   Weight: 76.9 kg (169 lb 9.6 oz)   Height: 175.3 cm (69\")      Physical Exam   Constitutional: He is oriented to person, place, and time. He appears well-developed and well-nourished. No distress.   HENT:   Head: Normocephalic.   Right Ear: External ear normal.   Left Ear: External ear normal.   Nose: Nose normal.   Eyes: Pupils are equal, round, and reactive to light.   Neck: Normal range of motion. Neck supple. No tracheal deviation present. No thyromegaly present.   Cardiovascular: Normal rate, normal heart sounds and intact distal pulses.    No murmur heard.  Pulmonary/Chest: Effort normal and breath sounds normal. No respiratory distress. He has no wheezes. He has no rales. He exhibits no tenderness.   Abdominal: Soft. Bowel sounds are normal. He exhibits no distension and no mass. There is no tenderness.   Musculoskeletal: Normal range of motion. He exhibits no edema.   Incision in the right anterior chest is healed without erythema and without drainage.  Subcutaneous edema is noted above the incision.   Lymphadenopathy:     He has no cervical adenopathy.   Neurological: He is alert and oriented to person, place, and time. He has normal reflexes. No cranial nerve deficit.   Skin: Skin is warm and dry. No rash noted. No erythema.   Psychiatric: He has a normal mood and affect.       Labs/Imaging: none      Assessment: Anterior chest wall trauma healing.  Continuing to complain of neck pain.    Plan: Refer for evaluation by occupational medicine specialist in regard to neck pain and pain in the chest.  Return to clinic when necessary.     "     Patient Active Problem List   Diagnosis   • Hyperlipidemia   • Hypertension   • Hypothyroidism   • Irritable bowel syndrome   • Sleep apnea   • Generalized anxiety disorder   • Inguinal hernia   • Herpes simplex infection   • Coronary artery disease involving native coronary artery of native heart with unstable angina pectoris (CMS/HCC)   • Hematoma of right chest wall   • Frequent unifocal PVCs     Signed by:      Gerardo Gutierrez MD  [facility]  07/09/18   12:02 PM

## 2018-07-09 ENCOUNTER — HOSPITAL ENCOUNTER (OUTPATIENT)
Facility: HOSPITAL | Age: 68
Discharge: HOME | End: 2018-07-10
Attending: INTERNAL MEDICINE | Admitting: INTERNAL MEDICINE

## 2018-07-09 DIAGNOSIS — I49.3 FREQUENT PVCS: ICD-10-CM

## 2018-07-09 LAB — ACT BLD: 219 SECONDS (ref 82–152)

## 2018-07-09 PROCEDURE — 93621 COMP EP EVL L PAC&REC C SINS: CPT | Performed by: INTERNAL MEDICINE

## 2018-07-09 PROCEDURE — C1730 CATH, EP, 19 OR FEW ELECT: HCPCS | Performed by: INTERNAL MEDICINE

## 2018-07-09 PROCEDURE — C1769 GUIDE WIRE: HCPCS | Performed by: INTERNAL MEDICINE

## 2018-07-09 PROCEDURE — C1894 INTRO/SHEATH, NON-LASER: HCPCS

## 2018-07-09 PROCEDURE — 25010000002 FENTANYL CITRATE (PF) 100 MCG/2ML SOLUTION: Performed by: INTERNAL MEDICINE

## 2018-07-09 PROCEDURE — C1894 INTRO/SHEATH, NON-LASER: HCPCS | Performed by: INTERNAL MEDICINE

## 2018-07-09 PROCEDURE — 25010000002 HEPARIN (PORCINE) PER 1000 UNITS: Performed by: INTERNAL MEDICINE

## 2018-07-09 PROCEDURE — 93654 COMPRE EP EVAL TX VT: CPT | Performed by: INTERNAL MEDICINE

## 2018-07-09 PROCEDURE — 93623 PRGRMD STIMJ&PACG IV RX NFS: CPT | Performed by: INTERNAL MEDICINE

## 2018-07-09 PROCEDURE — 99152 MOD SED SAME PHYS/QHP 5/>YRS: CPT | Performed by: INTERNAL MEDICINE

## 2018-07-09 PROCEDURE — 25010000002 PROTAMINE SULFATE PER 10 MG: Performed by: INTERNAL MEDICINE

## 2018-07-09 PROCEDURE — 93005 ELECTROCARDIOGRAM TRACING: CPT | Performed by: INTERNAL MEDICINE

## 2018-07-09 PROCEDURE — C1766 INTRO/SHEATH,STRBLE,NON-PEEL: HCPCS | Performed by: INTERNAL MEDICINE

## 2018-07-09 PROCEDURE — 94660 CPAP INITIATION&MGMT: CPT

## 2018-07-09 PROCEDURE — 25010000002 ADENOSINE PER 6 MG: Performed by: INTERNAL MEDICINE

## 2018-07-09 PROCEDURE — 25010000002 ONDANSETRON PER 1 MG: Performed by: INTERNAL MEDICINE

## 2018-07-09 PROCEDURE — 25010000002 ENOXAPARIN PER 10 MG: Performed by: INTERNAL MEDICINE

## 2018-07-09 PROCEDURE — G0378 HOSPITAL OBSERVATION PER HR: HCPCS

## 2018-07-09 PROCEDURE — C1732 CATH, EP, DIAG/ABL, 3D/VECT: HCPCS | Performed by: INTERNAL MEDICINE

## 2018-07-09 PROCEDURE — 85347 COAGULATION TIME ACTIVATED: CPT

## 2018-07-09 PROCEDURE — C1733 CATH, EP, OTHR THAN COOL-TIP: HCPCS | Performed by: INTERNAL MEDICINE

## 2018-07-09 PROCEDURE — 94770: CPT

## 2018-07-09 PROCEDURE — 25010000002 MIDAZOLAM PER 1 MG: Performed by: INTERNAL MEDICINE

## 2018-07-09 PROCEDURE — 93010 ELECTROCARDIOGRAM REPORT: CPT | Performed by: INTERNAL MEDICINE

## 2018-07-09 RX ORDER — AMIODARONE HYDROCHLORIDE 200 MG/1
400 TABLET ORAL ONCE
Status: DISCONTINUED | OUTPATIENT
Start: 2018-07-09 | End: 2018-07-10 | Stop reason: HOSPADM

## 2018-07-09 RX ORDER — SERTRALINE HYDROCHLORIDE 100 MG/1
100 TABLET, FILM COATED ORAL DAILY
Status: DISCONTINUED | OUTPATIENT
Start: 2018-07-09 | End: 2018-07-10 | Stop reason: HOSPADM

## 2018-07-09 RX ORDER — OXYCODONE HYDROCHLORIDE AND ACETAMINOPHEN 5; 325 MG/1; MG/1
1 TABLET ORAL EVERY 4 HOURS PRN
Status: DISCONTINUED | OUTPATIENT
Start: 2018-07-09 | End: 2018-07-10 | Stop reason: HOSPADM

## 2018-07-09 RX ORDER — HEPARIN SODIUM 1000 [USP'U]/ML
INJECTION, SOLUTION INTRAVENOUS; SUBCUTANEOUS AS NEEDED
Status: DISCONTINUED | OUTPATIENT
Start: 2018-07-09 | End: 2018-07-09 | Stop reason: HOSPADM

## 2018-07-09 RX ORDER — CARVEDILOL 3.12 MG/1
3.12 TABLET ORAL 2 TIMES DAILY WITH MEALS
Status: DISCONTINUED | OUTPATIENT
Start: 2018-07-09 | End: 2018-07-10 | Stop reason: HOSPADM

## 2018-07-09 RX ORDER — AMLODIPINE BESYLATE 5 MG/1
5 TABLET ORAL DAILY
Status: DISCONTINUED | OUTPATIENT
Start: 2018-07-10 | End: 2018-07-10 | Stop reason: HOSPADM

## 2018-07-09 RX ORDER — PROMETHAZINE HYDROCHLORIDE 25 MG/ML
12.5 INJECTION, SOLUTION INTRAMUSCULAR; INTRAVENOUS EVERY 4 HOURS PRN
Status: DISCONTINUED | OUTPATIENT
Start: 2018-07-09 | End: 2018-07-09 | Stop reason: HOSPADM

## 2018-07-09 RX ORDER — LEVOTHYROXINE SODIUM 0.15 MG/1
150 TABLET ORAL DAILY
Status: DISCONTINUED | OUTPATIENT
Start: 2018-07-09 | End: 2018-07-10 | Stop reason: HOSPADM

## 2018-07-09 RX ORDER — PROTAMINE SULFATE 10 MG/ML
INJECTION, SOLUTION INTRAVENOUS AS NEEDED
Status: DISCONTINUED | OUTPATIENT
Start: 2018-07-09 | End: 2018-07-09 | Stop reason: HOSPADM

## 2018-07-09 RX ORDER — OXYCODONE AND ACETAMINOPHEN 7.5; 325 MG/1; MG/1
2 TABLET ORAL EVERY 4 HOURS PRN
Status: DISCONTINUED | OUTPATIENT
Start: 2018-07-09 | End: 2018-07-10 | Stop reason: HOSPADM

## 2018-07-09 RX ORDER — AMIODARONE HYDROCHLORIDE 200 MG/1
200 TABLET ORAL
Status: DISCONTINUED | OUTPATIENT
Start: 2018-07-10 | End: 2018-07-10 | Stop reason: HOSPADM

## 2018-07-09 RX ORDER — MIDAZOLAM HYDROCHLORIDE 1 MG/ML
INJECTION INTRAMUSCULAR; INTRAVENOUS AS NEEDED
Status: DISCONTINUED | OUTPATIENT
Start: 2018-07-09 | End: 2018-07-09 | Stop reason: HOSPADM

## 2018-07-09 RX ORDER — ACETAMINOPHEN 325 MG/1
650 TABLET ORAL EVERY 4 HOURS PRN
Status: DISCONTINUED | OUTPATIENT
Start: 2018-07-09 | End: 2018-07-09 | Stop reason: HOSPADM

## 2018-07-09 RX ORDER — ASPIRIN 81 MG/1
81 TABLET ORAL DAILY
Status: DISCONTINUED | OUTPATIENT
Start: 2018-07-09 | End: 2018-07-10 | Stop reason: HOSPADM

## 2018-07-09 RX ORDER — ACETAMINOPHEN 325 MG/1
650 TABLET ORAL EVERY 4 HOURS PRN
Status: DISCONTINUED | OUTPATIENT
Start: 2018-07-09 | End: 2018-07-10 | Stop reason: HOSPADM

## 2018-07-09 RX ORDER — MULTIPLE VITAMINS W/ MINERALS TAB 9MG-400MCG
1 TAB ORAL DAILY
Status: DISCONTINUED | OUTPATIENT
Start: 2018-07-10 | End: 2018-07-10 | Stop reason: HOSPADM

## 2018-07-09 RX ORDER — ISOSORBIDE MONONITRATE 60 MG/1
60 TABLET, EXTENDED RELEASE ORAL
Status: DISCONTINUED | OUTPATIENT
Start: 2018-07-10 | End: 2018-07-10 | Stop reason: HOSPADM

## 2018-07-09 RX ORDER — ACETAMINOPHEN 650 MG/1
650 SUPPOSITORY RECTAL EVERY 4 HOURS PRN
Status: DISCONTINUED | OUTPATIENT
Start: 2018-07-09 | End: 2018-07-10 | Stop reason: HOSPADM

## 2018-07-09 RX ORDER — ACETAMINOPHEN 160 MG/5ML
650 SOLUTION ORAL EVERY 4 HOURS PRN
Status: DISCONTINUED | OUTPATIENT
Start: 2018-07-09 | End: 2018-07-10 | Stop reason: HOSPADM

## 2018-07-09 RX ORDER — SODIUM CHLORIDE 0.9 % (FLUSH) 0.9 %
1-10 SYRINGE (ML) INJECTION AS NEEDED
Status: DISCONTINUED | OUTPATIENT
Start: 2018-07-09 | End: 2018-07-09 | Stop reason: HOSPADM

## 2018-07-09 RX ORDER — LATANOPROST 50 UG/ML
1 SOLUTION/ DROPS OPHTHALMIC NIGHTLY
Status: DISCONTINUED | OUTPATIENT
Start: 2018-07-09 | End: 2018-07-10 | Stop reason: HOSPADM

## 2018-07-09 RX ORDER — ATORVASTATIN CALCIUM 20 MG/1
20 TABLET, FILM COATED ORAL DAILY
Status: DISCONTINUED | OUTPATIENT
Start: 2018-07-09 | End: 2018-07-09

## 2018-07-09 RX ORDER — FENTANYL CITRATE 50 UG/ML
INJECTION, SOLUTION INTRAMUSCULAR; INTRAVENOUS AS NEEDED
Status: DISCONTINUED | OUTPATIENT
Start: 2018-07-09 | End: 2018-07-09 | Stop reason: HOSPADM

## 2018-07-09 RX ORDER — CLOPIDOGREL BISULFATE 75 MG/1
75 TABLET ORAL DAILY
Status: DISCONTINUED | OUTPATIENT
Start: 2018-07-09 | End: 2018-07-10 | Stop reason: HOSPADM

## 2018-07-09 RX ORDER — ADENOSINE 3 MG/ML
INJECTION, SOLUTION INTRAVENOUS AS NEEDED
Status: DISCONTINUED | OUTPATIENT
Start: 2018-07-09 | End: 2018-07-09 | Stop reason: HOSPADM

## 2018-07-09 RX ORDER — IBUPROFEN 400 MG/1
400 TABLET ORAL EVERY 6 HOURS PRN
Status: DISCONTINUED | OUTPATIENT
Start: 2018-07-09 | End: 2018-07-10 | Stop reason: HOSPADM

## 2018-07-09 RX ORDER — LOSARTAN POTASSIUM 50 MG/1
50 TABLET ORAL DAILY
Status: DISCONTINUED | OUTPATIENT
Start: 2018-07-09 | End: 2018-07-10 | Stop reason: HOSPADM

## 2018-07-09 RX ORDER — ACYCLOVIR 200 MG/1
400 CAPSULE ORAL DAILY
Status: DISCONTINUED | OUTPATIENT
Start: 2018-07-10 | End: 2018-07-10 | Stop reason: HOSPADM

## 2018-07-09 RX ORDER — NITROGLYCERIN 0.4 MG/1
0.4 TABLET SUBLINGUAL
Status: DISCONTINUED | OUTPATIENT
Start: 2018-07-09 | End: 2018-07-09 | Stop reason: HOSPADM

## 2018-07-09 RX ORDER — SODIUM CHLORIDE 9 MG/ML
INJECTION, SOLUTION INTRAVENOUS CONTINUOUS PRN
Status: COMPLETED | OUTPATIENT
Start: 2018-07-09 | End: 2018-07-09

## 2018-07-09 RX ORDER — ONDANSETRON 2 MG/ML
INJECTION INTRAMUSCULAR; INTRAVENOUS AS NEEDED
Status: DISCONTINUED | OUTPATIENT
Start: 2018-07-09 | End: 2018-07-09 | Stop reason: HOSPADM

## 2018-07-09 RX ADMIN — LOSARTAN POTASSIUM 50 MG: 50 TABLET ORAL at 20:27

## 2018-07-09 RX ADMIN — CARVEDILOL 3.12 MG: 3.12 TABLET, FILM COATED ORAL at 20:27

## 2018-07-09 RX ADMIN — LATANOPROST 1 DROP: 50 SOLUTION OPHTHALMIC at 20:27

## 2018-07-09 RX ADMIN — CLOPIDOGREL BISULFATE 75 MG: 75 TABLET ORAL at 20:26

## 2018-07-09 RX ADMIN — ASPIRIN 81 MG: 81 TABLET, COATED ORAL at 20:27

## 2018-07-09 RX ADMIN — ENOXAPARIN SODIUM 40 MG: 40 INJECTION SUBCUTANEOUS at 20:27

## 2018-07-09 RX ADMIN — SERTRALINE HYDROCHLORIDE 100 MG: 100 TABLET ORAL at 20:27

## 2018-07-09 RX ADMIN — LEVOTHYROXINE SODIUM 150 MCG: 150 TABLET ORAL at 20:27

## 2018-07-09 NOTE — H&P (VIEW-ONLY)
Enio Tapia  1950  PCP: Troy Mckay MD    SUBJECTIVE:   Enio Tapia is a 68 y.o. male seen for a consultation visit regarding the following:     Chief Complaint:   Chief Complaint   Patient presents with   • Coronary Artery Disease          HPI:   Ongoing PVCs and palpitations. Increased weakness/fatigue as a result    History:  This is a 68-year-old patient followed by Dr. Ribeiro.  He has a history of extensive coronary artery disease.  He most recently has found to have frequent ventricular ectopy for which he has been having increased shortness of breath and fatigue as a result. He was bite by a horse on May 19th, 2018. There was severe chest trauma from the bite. He noted that palp/pvcs, after have the horse bite trauma.  He's had extensive chest trauma that is also adding to his issues.        Cardiac PMH: (Old records have been reviewed and summarized below)  1. Coronary artery disease  a. As/P3 0.5 x 32 Taxus JOHANA mid RCA lesion 10/15/04  b. Normal stress echo Feb 2010  c. Echo February 2010 showed normal EF of 60% with trace TR and MR. mary. C 3/31/17For unstable angina: Diffuse 70% stenosis of mid LAD noted.  Stented with Xience 3.0 x 33 JOHANA reducing stenosis to 0%.  Also 70% discrete stenosis in proximal first diagonal stented with Xience Alpine 2.25 x 15 JOHANA reducing stenosis to 0%.  Previous stenting proximal to mid RCA widely patent.  Normal LVEF.  e. Echo 3/31/17: EF 55%.  Mild LVH noted.  2. Hypertension  3. Hyperlipidemia  4. Hypothyroidism  5. Anxiety disorder  6. Glaucoma  7. Osteoarthritis  8. GRABIEL with CPAP compliance.  9. 24 Hr Holter - May 2018 - Min 61, Mean 70, Max 108 - 25,091 PVCs - Mostly [place]      Past Medical History, Past Surgical History, Family history, Social History, and Medications were all reviewed with the patient today and updated as necessary.     Current Outpatient Prescriptions   Medication Sig Dispense Refill   • acyclovir (ZOVIRAX) 400 MG tablet TAKE ONE  TABLET BY MOUTH DAILY 30 tablet 3   • amLODIPine (NORVASC) 5 MG tablet Take 1 tablet by mouth Daily. (Patient taking differently: Take 5 mg by mouth Daily.) 30 tablet 11   • aspirin 81 MG EC tablet Take 81 mg by mouth Daily.     • carvedilol (COREG) 3.125 MG tablet TAKE ONE TABLET BY MOUTH TWICE A DAY WITH MEALS 60 tablet 5   • clopidogrel (PLAVIX) 75 MG tablet Take 1 tablet by mouth Daily. 30 tablet 1   • HYDROcodone-acetaminophen (NORCO) 7.5-325 MG per tablet Take 1 tablet by mouth Every 6 (Six) Hours As Needed for Moderate Pain .     • isosorbide mononitrate (IMDUR) 60 MG 24 hr tablet Take 1 tablet by mouth Daily. (Patient taking differently: Take 60 mg by mouth Daily.) 30 tablet 11   • levothyroxine (SYNTHROID, LEVOTHROID) 150 MCG tablet Take 1 tablet by mouth Daily. 90 tablet 2   • losartan (COZAAR) 50 MG tablet Take 1 tablet by mouth Daily. 90 tablet 2   • Multiple Vitamins-Minerals (CENTRUM SILVER PO) Take 1 tablet by mouth Daily.     • pravastatin (PRAVACHOL) 40 MG tablet Take 2 tablets by mouth Daily. (Patient taking differently: Take 80 mg by mouth Every Night.) 90 tablet 3   • travoprost, BAK free, (TRAVATAN) 0.004 % solution ophthalmic solution 1 drop every evening. in affected eye(s)     • sertraline (ZOLOFT) 100 MG tablet TAKE 1 TABLET BY MOUTH DAILY 30 tablet 0     No current facility-administered medications for this visit.      Allergies   Allergen Reactions   • Statins Myalgia         Past Medical History:   Diagnosis Date   • Arthritis    • Chest wall hematoma    • Coronary artery disease involving native coronary artery of native heart with unstable angina pectoris 3/31/2017   • Depression    • Disease of thyroid gland    • Enlarged prostate    • Frequent PVCs    • Glaucoma    • Hematoma     right chest   • Hyperlipidemia    • Hypertension    • GRABIEL on CPAP      Past Surgical History:   Procedure Laterality Date   • CARDIAC CATHETERIZATION     • CARDIAC CATHETERIZATION N/A 3/31/2017    Procedure:  "Left Heart Cath;  Surgeon: Enio Benavidez MD;  [facility]:  [facility];  Service:    • CATARACT EXTRACTION Bilateral    • COLONOSCOPY     • CORONARY STENT PLACEMENT  10/16/2004, 2017   • INCISION AND DRAINAGE HEMATOMA  06/08/2018    Chest Wall   • INCISION AND DRAINAGE LEG Right 6/8/2018    Procedure: INCISION AND DRAINAGE RIGHT TRUNK HEMATOMA;  Surgeon: Gerardo Gutierrez MD;  Location:  [facility];  Service: Cardiothoracic   • TONSILLECTOMY     • VASECTOMY  08/1998     Family History   Problem Relation Age of Onset   • Heart disease Mother    • Heart failure Mother    • Pneumonia Father      Social History   Substance Use Topics   • Smoking status: Never Smoker   • Smokeless tobacco: Former User     Types: Chew, Snuff     Quit date: 01/2002   • Alcohol use 7.2 oz/week     12 Cans of beer per week      Comment: occas       ROS:  Review of Symptoms:  General: no recent weight loss/gain, weakness or fatigue  Skin: no rashes, lumps, or other skin changes  HEENT: no dizziness, lightheadedness, or vision changes  Respiratory: no cough or hemoptysis  Cardiovascular: + palpitations, and tachycardia  Gastrointestinal: no black/tarry stools or diarrhea  Urinary: no change in frequency or urgency  Peripheral Vascular: no claudication or leg cramps  Musculoskeletal: no muscle or joint pain/stiffness  Psychiatric: no depression or excessive stress  Neurological: no sensory or motor loss, no syncope  Hematologic: no anemia, easy bruising or bleeding  Endocrine: no thyroid problems, nor heat or cold intolerance       PHYSICAL EXAM:   /78 (BP Location: Left arm, Patient Position: Sitting)   Pulse 60   Ht 175.3 cm (69\")   Wt 76.2 kg (168 lb)   BMI 24.81 kg/m²      Wt Readings from Last 5 Encounters:   06/29/18 76.2 kg (168 lb)   06/22/18 76.2 kg (168 lb)   06/20/18 76.2 kg (168 lb)   06/10/18 78.7 kg (173 lb 9.6 oz)   06/07/18 77.7 kg (171 lb 6.4 oz)     BP Readings from Last 5 Encounters:   06/29/18 120/78 "   06/22/18 128/78   06/20/18 120/70   06/11/18 101/65   06/07/18 124/66       General-Well Nourished, Well developed  Eyes - PERRLA  Neck- supple, No mass  CV- regular rate and rhythm, no MRG  Lung- clear bilaterally  Abd- soft, +BS  Musc/skel - Norm strength and range of motion  Skin- warm and dry  Neuro - Alert & Oriented x 3, appropriate mood.    Medical problems and test results were reviewed with the patient today.     Results for orders placed or performed during the hospital encounter of 06/08/18   Anaerobic Culture - Body Fluid, Chest, Right   Result Value Ref Range    Culture Scant growth (1+) Cutibacterium acnes (A)    Fungus Culture - Body Fluid, Chest, Right   Result Value Ref Range    Fungus Culture No fungus isolated at 3 weeks    Body Fluid Culture - Body Fluid, Chest, Right   Result Value Ref Range    BF Culture No growth at 4 days     Gram Stain Result Few (2+) WBCs seen     Gram Stain Result No organisms seen    AFB Culture - Body Fluid, Chest, Right   Result Value Ref Range    AFB Culture No AFB isolated at 3 weeks     AFB Stain No acid fast bacilli seen on concentrated smear    Basic Metabolic Panel   Result Value Ref Range    Glucose 110 (H) 70 - 100 mg/dL    BUN 19 9 - 23 mg/dL    Creatinine 0.86 0.60 - 1.30 mg/dL    Sodium 137 132 - 146 mmol/L    Potassium 4.3 3.5 - 5.5 mmol/L    Chloride 104 99 - 109 mmol/L    CO2 27.0 20.0 - 31.0 mmol/L    Calcium 8.5 (L) 8.7 - 10.4 mg/dL    eGFR Non African Amer 88 >60 mL/min/1.73    BUN/Creatinine Ratio 22.1 7.0 - 25.0    Anion Gap 6.0 3.0 - 11.0 mmol/L   CBC Auto Differential   Result Value Ref Range    WBC 5.03 3.50 - 10.80 10*3/mm3    RBC 3.20 (L) 4.20 - 5.76 10*6/mm3    Hemoglobin 10.4 (L) 13.1 - 17.5 g/dL    Hematocrit 30.6 (L) 38.9 - 50.9 %    MCV 95.6 80.0 - 99.0 fL    MCH 32.5 (H) 27.0 - 31.0 pg    MCHC 34.0 32.0 - 36.0 g/dL    RDW 14.5 11.3 - 14.5 %    RDW-SD 51.1 37.0 - 54.0 fl    MPV 9.2 6.0 - 12.0 fL    Platelets 185 150 - 450 10*3/mm3     Neutrophil % 77.1 (H) 41.0 - 71.0 %    Lymphocyte % 15.7 (L) 24.0 - 44.0 %    Monocyte % 6.6 0.0 - 12.0 %    Eosinophil % 0.4 0.0 - 3.0 %    Basophil % 0.2 0.0 - 1.0 %    Immature Grans % 0.2 0.0 - 0.6 %    Neutrophils, Absolute 3.88 1.50 - 8.30 10*3/mm3    Lymphocytes, Absolute 0.79 0.60 - 4.80 10*3/mm3    Monocytes, Absolute 0.33 0.00 - 1.00 10*3/mm3    Eosinophils, Absolute 0.02 0.00 - 0.30 10*3/mm3    Basophils, Absolute 0.01 0.00 - 0.20 10*3/mm3    Immature Grans, Absolute 0.01 0.00 - 0.03 10*3/mm3   Prepare RBC, 2 Units   Result Value Ref Range    Product Code L3458R86     Unit Number [id number]     UNIT  ABO A     UNIT  RH POS     Dispense Status RE     Blood Type APOS     Blood Expiration Date 201806132359     Blood Type Barcode 6200     Product Code U7754P28     Unit Number [id number]     UNIT  ABO A     UNIT  RH POS     Dispense Status RE     Blood Type APOS     Blood Expiration Date 201806132359     Blood Type Barcode 6200    ABO/Rh Specimen Verification   Result Value Ref Range    ABO Type A     RH type Positive          Lab Results   Component Value Date    CHOL 172 05/25/2018    HDL 53 05/25/2018     05/25/2018       EKG:  (EKG/Tracing has been independently visualized by me and summarized below)  NSR, RVOT PVCs      ECG 12 Lead  Date/Time: 6/29/2018 10:38 AM  Performed by: RUBEN SAAVEDRA  Authorized by: RUBEN SAAVEDRA   Previous ECG: no previous ECG available  Rhythm: sinus rhythm  Ectopy: PVCs  Rate: normal  BPM: 60  Conduction: conduction normal  ST Segments: ST segments normal  T Waves: T waves normal  Clinical impression: abnormal ECG            ASSESSMENT and PLAN  1.  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  We discussed with him therapy options, because of his extensive coronary artery disease, bradycardia and lung disease antiarrhythmic options are limited.  We felt that the best option would  be for catheter-based ablation.  We discussed potential Electrophysiology Study with possible ablation.  I described the procedures in detail including risks, alternatives, and benefits.  We also discussed that risks include bleeding, vascular damage, stroke, MI, esophageal damage, cardiac perforation, and even death.  2.  Bradycardia-We are hopeful that his sinus node function will improved post ablation of the ventricular ectopy      Return for after procedure.            Brandon Parker M.D., F.CARLOS ALBERTO.C.C, F.H.R.S.  Cardiology/Electrophysiology  06/29/18  11:04 AM

## 2018-07-09 NOTE — PROCEDURES
PRE-ELECTROPHYSIOLOGY STUDY DIAGNOSES  1. Nonsustained ventricular tachycardia.  2. Frequent ventricular ectopy.  3. Symptomatic episodes of ventricular ectopy.     PROCEDURE PERFORMED  1. Diagnostic electrophysiology testing with ventricular tachycardia ablation.  2. Program pacing after infusion with Isoproterenol  3. Left atrial pacing and recording from the coronary sinus.    Anesthesia: Cath lab moderate sedation    I was present with the patient for the duration of moderate sedation and supervised staff who had no other duties and monitored the patient for the entire procedure     Name of independent trained observer: Robina Rendon  Intra-Service start time: 1440  Intra-Service end time: 1703     PROCEDURE IN DETAIL: The patient was brought into the EP lab in a fasting  state. The left shoulder was prepped and draped in the usual sterile fashion. A  7-Mauritanian sheath was placed in the left subclavian vein, over which a  coronary sinus mapping catheter and sheath were placed at the coronary  sinus with pacing and recording from the left atrium.The right and left groins were prepped and draped in the usual  sterile fashion. Access was obtained in the right femoral vein via the  Seldinger technique, over which an 8 and 10-[place]auritanian sheath was placed.  Access was obtained in the left femoral vein, over which a 5-[place]auritanian  sheath was placed. Through the 10-Mauritanian sheath, a pulmonary artery  catheter was placed along with a guidewire. It was then exchanged out for  a [place] Array, that was placed in the RV outflow tract. Prior to deployment of the Array, heparin was then given. A 5-[place]auritanian catheter was placed at the RV apex. The  8-[place]auritanian sheath was exchanged out for a [place] Agilis sheath. Through  the Agilis sheath, a standard curved 5 mm Blazer II ablation catheter was  then used. Reconstruction of the right ventricle outflow tract area was  performed. After full reconstruction of the right ventricle outflow  tract  Area, Isoproterenol was started. The ventricular ectopy was mapped, there was found to be 2 separate foci that were identified.  Induction of ectopy was difficult.  Less than 10 ectopic beats were able to be induced over a 2 hour time period.  After successful ablation of foci, no further ablation was performed. The His bundle was mapped  out, RV catheter was placed in the right atrium and a formal  electrophysiology test was performed.    1. Baseline rhythm showed normal sinus heart rhythm with an R-R interval of 1125,   2. MN interval of 292,   3. QRS of 148,   4. QT of 389,   5. AH of 145,   6. HV of 43.    7. Sinus node recovery time at 600 was 1385 at 400 was 1269.   8. The AV Wenckebach cycle length was 330.   9. AV node refractory period at 600 was 270, at 400 was 300  10. The VA Wenckebach cycle length was 370.   11. VA node refractory period at 600 was <210,   12. The ventricular effective refractory period at 600 was 210, at 400 was 200.     After completion of the test, Protamine was then given to  reverse the effects of heparin. Sheaths were pulled when ACT was less  than 180. The patient was recovered from his sedation and transferred  from the lab in a stable condition.     IMPRESSION  1. Successful catheter mapping ablation of 2 ventricular foci.  2. Induction of ectopy was difficult.  Less than 10 ectopic beats were able to be induced over a 2 hour time period.

## 2018-07-09 NOTE — INTERVAL H&P NOTE
H&P reviewed. The patient was examined and there are no changes to the H&P.  Patient with symptomatic PVC's, limited with antiarrhythmic options due to functional bradycardia with sinus node dysfunction and CAD.  We will proceed today with EP study +/- RFA PVC with Dr. Parker.  The risks, benefits, and alternatives of the procedure have been reviewed and the patient wishes to proceed.     ANNA Higgins  [facility]  7/9/2018  1:51 PM

## 2018-07-10 VITALS
RESPIRATION RATE: 16 BRPM | SYSTOLIC BLOOD PRESSURE: 119 MMHG | TEMPERATURE: 98.3 F | HEART RATE: 65 BPM | OXYGEN SATURATION: 96 % | BODY MASS INDEX: 24.62 KG/M2 | WEIGHT: 166.23 LBS | HEIGHT: 69 IN | DIASTOLIC BLOOD PRESSURE: 86 MMHG

## 2018-07-10 PROCEDURE — G0378 HOSPITAL OBSERVATION PER HR: HCPCS

## 2018-07-10 PROCEDURE — 99217 PR OBSERVATION CARE DISCHARGE MANAGEMENT: CPT | Performed by: INTERNAL MEDICINE

## 2018-07-10 RX ORDER — AMIODARONE HYDROCHLORIDE 200 MG/1
200 TABLET ORAL
Qty: 30 TABLET | Refills: 3 | Status: SHIPPED | OUTPATIENT
Start: 2018-07-10 | End: 2018-07-31

## 2018-07-10 RX ORDER — TEMAZEPAM 15 MG/1
15 CAPSULE ORAL NIGHTLY PRN
Qty: 30 CAPSULE | Refills: 3 | Status: SHIPPED | OUTPATIENT
Start: 2018-07-10 | End: 2018-10-19

## 2018-07-10 RX ADMIN — MULTIPLE VITAMINS W/ MINERALS TAB 1 TABLET: TAB ORAL at 08:16

## 2018-07-10 RX ADMIN — AMLODIPINE BESYLATE 5 MG: 5 TABLET ORAL at 08:17

## 2018-07-10 RX ADMIN — ISOSORBIDE MONONITRATE 60 MG: 60 TABLET, EXTENDED RELEASE ORAL at 08:15

## 2018-07-10 RX ADMIN — AMIODARONE HYDROCHLORIDE 200 MG: 200 TABLET ORAL at 08:17

## 2018-07-10 RX ADMIN — ACYCLOVIR 400 MG: 200 CAPSULE ORAL at 08:15

## 2018-07-10 RX ADMIN — CLOPIDOGREL BISULFATE 75 MG: 75 TABLET ORAL at 08:19

## 2018-07-10 RX ADMIN — ASPIRIN 81 MG: 81 TABLET, COATED ORAL at 08:17

## 2018-07-10 RX ADMIN — CARVEDILOL 3.12 MG: 3.12 TABLET, FILM COATED ORAL at 08:16

## 2018-07-10 NOTE — PLAN OF CARE
Problem: Patient Care Overview  Goal: Plan of Care Review  Outcome: Ongoing (interventions implemented as appropriate)   07/10/18 0700   Coping/Psychosocial   Plan of Care Reviewed With patient   Plan of Care Review   Progress improving   OTHER   Outcome Summary PT AWAITING DISCHARGE. NO PROBLEMS NOTED AT THIS TIME. WILL CONTINUE TO MONITOR.       Problem: Cardiac Catheterization (Diagnostic/Interventional) (Adult)  Goal: Signs and Symptoms of Listed Potential Problems Will be Absent, Minimized or Managed (Cardiac Catheterization)   07/10/18 0700   Goal/Outcome Evaluation   Problems Assessed (Cardiac Catheterization) all   Problems Present (Cardiac Cath) none

## 2018-07-10 NOTE — DISCHARGE SUMMARY
Physician Discharge Summary     Patient ID:  Enio Tapia  [id number]  68 y.o.  1950    Admit date: 7/9/2018    Discharge date and time: No discharge date for patient encounter.     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 As/P3 0.5 x 32 Taxus JOHANA mid RCA lesion 10/15/04  b. Normal stress echo Feb 2010  c. Echo February 2010 showed normal EF of 60% with trace TR and MR.  d. [facility] 3/31/17For unstable angina: Diffuse 70% stenosis of mid LAD noted.  Stented with Xience 3.0 x 33 JOHANA reducing stenosis to 0%.  Also 70% discrete stenosis in proximal first diagonal stented with Xience Alpine 2.25 x 15 JOHANA reducing stenosis to 0%.  Previous stenting proximal to mid RCA widely patent.  Normal LVEF.  e. Echo 3/31/17: EF 55%.  Mild LVH noted.     • Herpes simplex infection [B00.9]   • Hyperlipidemia [E78.5]   • Hypertension [I10]   • Hypothyroidism [E03.9]   • Irritable bowel syndrome [K58.9]   • Sleep apnea [G47.30]   • Generalized anxiety disorder [F41.1]   • Inguinal hernia [K40.90]       Cardiology Procedures this admission:    1. PVC Ablation    Hospital Course: Patient with recurrent PVCs and functional bradycardia. Underwent ablation.  Successful catheter mapping ablation of 2 ventricular foci.  Induction of ectopy was difficult.  Less than 10 ectopic beats were able to be induced over a 2 hour time period. Patient also has sleep apnea and does not use CPAP secondary to anxiety, therefore, Restoril was started    Discharge Exam:     Vitals:    07/10/18 0600    BP: 99/40   Pulse: 51   Resp: 16   Temp:    SpO2: 96%     General-Well Nourished, Well developed  Eyes - PERRLA  Neck- supple, No mass  CV- regular rate and rhythm, no MRG  Lung- clear bilaterally  Abd- soft, +BS  Musc/skel - Norm strength and range of motion  Skin- warm and dry  Neuro - Alert & Oriented x 3, appropriate mood.    Disposition:   ED Disposition     None          Patient Instructions:     Current Facility-Administered Medications:   •  acetaminophen (TYLENOL) tablet 650 mg, 650 mg, Oral, Q4H PRN **OR** acetaminophen (TYLENOL) 160 MG/5ML solution 650 mg, 650 mg, Oral, Q4H PRN **OR** acetaminophen (TYLENOL) suppository 650 mg, 650 mg, Rectal, Q4H PRN, Brandon Parker MD  •  acyclovir (ZOVIRAX) capsule 400 mg, 400 mg, Oral, Daily, ANNA Schuster  •  amiodarone (PACERONE) tablet 200 mg, 200 mg, Oral, Q24H, Brandon Parker MD  •  amiodarone (PACERONE) tablet 400 mg, 400 mg, Oral, Once, Brandon Parker MD  •  amLODIPine (NORVASC) tablet 5 mg, 5 mg, Oral, Daily, ANNA Schuster  •  aspirin EC tablet 81 mg, 81 mg, Oral, Daily, ANNA Schuster, 81 mg at 07/09/18 2027  •  carvedilol (COREG) tablet 3.125 mg, 3.125 mg, Oral, BID With Meals, ANNA Schuster, 3.125 mg at 07/09/18 2027  •  clopidogrel (PLAVIX) tablet 75 mg, 75 mg, Oral, Daily, ANNA Schuster, 75 mg at 07/09/18 2026  •  ibuprofen (ADVIL,MOTRIN) tablet 400 mg, 400 mg, Oral, Q6H PRN, Brandon Parker MD  •  isosorbide mononitrate (IMDUR) 24 hr tablet 60 mg, 60 mg, Oral, Q24H, ANNA Schuster  •  latanoprost (XALATAN) 0.005 % ophthalmic solution 1 drop, 1 drop, Both Eyes, Nightly, ANNA Schuster, 1 drop at 07/09/18 2027  •  levothyroxine (SYNTHROID, LEVOTHROID) tablet 150 mcg, 150 mcg, Oral, Daily, ANNA Schuster, 150 mcg at 07/09/18 2027  •  losartan (COZAAR) tablet 50 mg, 50 mg, Oral, Daily, Brigette Ferro,  APRN, 50 mg at 07/09/18 2027  •  multivitamin with minerals 1 tablet, 1 tablet, Oral, Daily, ANNA Schuster  •  oxyCODONE-acetaminophen (PERCOCET) 5-325 MG per tablet 1 tablet, 1 tablet, Oral, Q4H PRN, Brandon Parker MD  •  oxyCODONE-acetaminophen (PERCOCET) 7.5-325 MG per tablet 2 tablet, 2 tablet, Oral, Q4H PRN, Brandon Parker MD  •  sertraline (ZOLOFT) tablet 100 mg, 100 mg, Oral, Daily, ANNA Schuster, 100 mg at 07/09/18 2027    Referenced discharge instructions provided by nursing for diet and activity.    Follow-up with Dr. Parker in 2-3 weeks    Signed:  Brandon Parker MD  7/10/2018  7:34 AM

## 2018-07-10 NOTE — PLAN OF CARE
Problem: Patient Care Overview  Goal: Plan of Care Review  Outcome: Ongoing (interventions implemented as appropriate)   07/09/18 2201   Coping/Psychosocial   Plan of Care Reviewed With patient   Plan of Care Review   Progress improving   OTHER   Outcome Summary Patient denies pain, sinus rhythm, VSS     Goal: Individualization and Mutuality  Outcome: Ongoing (interventions implemented as appropriate)    Goal: Discharge Needs Assessment  Outcome: Ongoing (interventions implemented as appropriate)    Goal: Interprofessional Rounds/Family Conf  Outcome: Ongoing (interventions implemented as appropriate)      Problem: Cardiac Impairment (IRF) (Adult)  Goal: Optimal Level of Cardiac Function  Outcome: Ongoing (interventions implemented as appropriate)    Goal: Optimal Health Related Quality of Life and Risk Factor Reduction  Outcome: Ongoing (interventions implemented as appropriate)      Problem: Cardiac Catheterization (Diagnostic/Interventional) (Adult)  Goal: Signs and Symptoms of Listed Potential Problems Will be Absent, Minimized or Managed (Cardiac Catheterization)  Outcome: Ongoing (interventions implemented as appropriate)    Goal: Anesthesia/Sedation Recovery  Outcome: Outcome(s) achieved Date Met: 07/09/18

## 2018-07-11 ENCOUNTER — TRANSITIONAL CARE MANAGEMENT TELEPHONE ENCOUNTER (OUTPATIENT)
Dept: FAMILY MEDICINE CLINIC | Facility: CLINIC | Age: 68
End: 2018-07-11

## 2018-07-11 NOTE — OUTREACH NOTE
TYSON call completed.  Please refer to TCM call flowsheet for call documentation.  Patient reports that he is doing very well.  He denies n/v/d/c and pain.  He states that he will make an appointment for a physical later in the year.  He had no questions regarding medications or d/c instructions from hospital.

## 2018-07-20 LAB
FUNGUS WND CULT: NORMAL
MYCOBACTERIUM SPEC CULT: NORMAL
NIGHT BLUE STAIN TISS: NORMAL

## 2018-07-26 RX ORDER — SERTRALINE HYDROCHLORIDE 100 MG/1
TABLET, FILM COATED ORAL
Qty: 30 TABLET | Refills: 0 | Status: SHIPPED | OUTPATIENT
Start: 2018-07-26 | End: 2018-10-19 | Stop reason: SDUPTHER

## 2018-07-28 RX ORDER — ACYCLOVIR 400 MG/1
TABLET ORAL
Qty: 30 TABLET | Refills: 2 | Status: SHIPPED | OUTPATIENT
Start: 2018-07-28 | End: 2018-10-19 | Stop reason: SDUPTHER

## 2018-07-31 ENCOUNTER — OFFICE VISIT (OUTPATIENT)
Dept: CARDIOLOGY | Facility: CLINIC | Age: 68
End: 2018-07-31

## 2018-07-31 VITALS
DIASTOLIC BLOOD PRESSURE: 70 MMHG | HEIGHT: 69 IN | SYSTOLIC BLOOD PRESSURE: 110 MMHG | WEIGHT: 168 LBS | BODY MASS INDEX: 24.88 KG/M2

## 2018-07-31 DIAGNOSIS — I49.3 PVC (PREMATURE VENTRICULAR CONTRACTION): Primary | ICD-10-CM

## 2018-07-31 PROCEDURE — 99214 OFFICE O/P EST MOD 30 MIN: CPT | Performed by: INTERNAL MEDICINE

## 2018-07-31 PROCEDURE — 93000 ELECTROCARDIOGRAM COMPLETE: CPT | Performed by: INTERNAL MEDICINE

## 2018-07-31 RX ORDER — AMIODARONE HYDROCHLORIDE 200 MG/1
100 TABLET ORAL
Qty: 30 TABLET | Refills: 3
Start: 2018-07-31 | End: 2018-09-18

## 2018-07-31 NOTE — PROGRESS NOTES
Enio Tapia  1950  PCP: Troy Mckay MD    SUBJECTIVE:   Enio Tapia is a 68 y.o. male seen for a consultation visit regarding the following:     Chief Complaint:   Chief Complaint   Patient presents with   • Frequent unifocal PVC's          HPI:   No Palps, Feeling much better post ablation.     History:  This is a 68-year-old patient followed by Dr. Ribeiro.  He has a history of extensive coronary artery disease.  He most recently has found to have frequent ventricular ectopy for which he has been having increased shortness of breath and fatigue as a result. He was bite by a horse on May 19th, 2018. There was severe chest trauma from the bite. He noted that palp/pvcs, after have the horse bite trauma.  He's had extensive chest trauma that is also adding to his issues.        Cardiac PMH: (Old records have been reviewed and summarized below)  1. Coronary artery disease  a. As/P3 0.5 x 32 Taxus JOHANA mid RCA lesion 10/15/04  b. Normal stress echo Feb 2010  c. Echo February 2010 showed normal EF of 60% with trace TR and MR. mary. [facility] 3/31/17For unstable angina: Diffuse 70% stenosis of mid LAD noted.  Stented with Xience 3.0 x 33 JOHANA reducing stenosis to 0%.  Also 70% discrete stenosis in proximal first diagonal stented with Xience Alpine 2.25 x 15 JOHANA reducing stenosis to 0%.  Previous stenting proximal to mid RCA widely patent.  Normal LVEF.  e. Echo 3/31/17: EF 55%.  Mild LVH noted.  2. Hypertension  3. Hyperlipidemia  4. Hypothyroidism  5. Anxiety disorder  6. Glaucoma  7. Osteoarthritis  8. GRABIEL with CPAP compliance.  9. 24 Hr Holter - May 2018 - Min 61, Mean 70, Max 108 - 25,091 PVCs - Mostly [place]  10. EP Study - Ectopy ablation - June 2018 - 1. Successful catheter mapping ablation of 2 ventricular foci. 2. Induction of ectopy was difficult.  Less than 10 ectopic beats were able to be induced over a 2 hour time period.      Past Medical History, Past Surgical History, Family history, Social  History, and Medications were all reviewed with the patient today and updated as necessary.     Current Outpatient Prescriptions   Medication Sig Dispense Refill   • acyclovir (ZOVIRAX) 400 MG tablet TAKE ONE TABLET BY MOUTH DAILY 30 tablet 2   • amiodarone (PACERONE) 200 MG tablet Take 0.5 tablets by mouth Daily. 30 tablet 3   • amLODIPine (NORVASC) 5 MG tablet Take 1 tablet by mouth Daily. (Patient taking differently: Take 5 mg by mouth Daily.) 30 tablet 11   • aspirin 81 MG EC tablet Take 81 mg by mouth Daily.     • carvedilol (COREG) 3.125 MG tablet TAKE ONE TABLET BY MOUTH TWICE A DAY WITH MEALS 60 tablet 5   • clopidogrel (PLAVIX) 75 MG tablet Take 1 tablet by mouth Daily. 30 tablet 1   • isosorbide mononitrate (IMDUR) 60 MG 24 hr tablet Take 1 tablet by mouth Daily. (Patient taking differently: Take 60 mg by mouth Daily.) 30 tablet 11   • levothyroxine (SYNTHROID, LEVOTHROID) 150 MCG tablet Take 1 tablet by mouth Daily. 90 tablet 2   • losartan (COZAAR) 50 MG tablet Take 1 tablet by mouth Daily. 90 tablet 2   • Multiple Vitamins-Minerals (CENTRUM SILVER PO) Take 1 tablet by mouth Daily.     • pravastatin (PRAVACHOL) 40 MG tablet Take 2 tablets by mouth Daily. (Patient taking differently: Take 80 mg by mouth Every Night.) 90 tablet 3   • sertraline (ZOLOFT) 100 MG tablet TAKE ONE TABLET BY MOUTH DAILY 30 tablet 0   • temazepam (RESTORIL) 15 MG capsule Take 1 capsule by mouth At Night As Needed for Sleep. 30 capsule 3   • travoprost, BAK free, (TRAVATAN) 0.004 % solution ophthalmic solution Administer 1 drop to both eyes Every Evening. in affected eye(s)        No current facility-administered medications for this visit.      Allergies   Allergen Reactions   • Statins Myalgia     ESPECIALLY LIPITOR         Past Medical History:   Diagnosis Date   • Arthritis    • BPH (benign prostatic hyperplasia)    • Chest wall hematoma    • Coronary artery disease involving native coronary artery of native heart with  unstable angina pectoris (CMS/HCC) 3/31/2017   • Depression    • Disease of thyroid gland    • Enlarged prostate    • Frequent PVCs    • Glaucoma    • Hematoma     right chest   • [place] (hard of hearing)    • Hyperlipidemia    • Hypertension    • GRABIEL on CPAP      Past Surgical History:   Procedure Laterality Date   • CARDIAC CATHETERIZATION     • CARDIAC CATHETERIZATION N/A 3/31/2017    Procedure: Left Heart Cath;  Surgeon: Enio Benavidez MD;  Location:  [facility];  Service:    • CARDIAC ELECTROPHYSIOLOGY PROCEDURE N/A 7/9/2018    Procedure: Ablation PVC;  Surgeon: Brandon Parker MD;  Location:  [facility];  Service: Cardiovascular   • CATARACT EXTRACTION Bilateral    • COLONOSCOPY     • CORONARY STENT PLACEMENT  10/16/2004, 2017   • INCISION AND DRAINAGE HEMATOMA  06/08/2018    Chest Wall-right side    • INCISION AND DRAINAGE LEG Right 6/8/2018    Procedure: INCISION AND DRAINAGE RIGHT TRUNK HEMATOMA;  Surgeon: Gerardo Gutierrez MD;  Location:  [facility];  Service: Cardiothoracic   • TONSILLECTOMY     • VASECTOMY  08/1998     Family History   Problem Relation Age of Onset   • Heart disease Mother    • Heart failure Mother    • Pneumonia Father      Social History   Substance Use Topics   • Smoking status: Never Smoker   • Smokeless tobacco: Former User     Types: Chew, Snuff     Quit date: 01/2002   • Alcohol use 7.2 oz/week     12 Cans of beer per week      Comment: occas       ROS:  Review of Symptoms:  General: no recent weight loss/gain, weakness or fatigue  Skin: no rashes, lumps, or other skin changes  HEENT: no dizziness, lightheadedness, or vision changes  Respiratory: no cough or hemoptysis  Cardiovascular: + palpitations, and tachycardia  Gastrointestinal: no black/tarry stools or diarrhea  Urinary: no change in frequency or urgency  Peripheral Vascular: no claudication or leg cramps  Musculoskeletal: no muscle or joint pain/stiffness  Psychiatric: no depression or  "excessive stress  Neurological: no sensory or motor loss, no syncope  Hematologic: no anemia, easy bruising or bleeding  Endocrine: no thyroid problems, nor heat or cold intolerance       PHYSICAL EXAM:   /70 (BP Location: Left arm, Patient Position: Sitting)   Ht 175.3 cm (69\")   Wt 76.2 kg (168 lb)   BMI 24.81 kg/m²      Wt Readings from Last 5 Encounters:   07/31/18 76.2 kg (168 lb)   07/09/18 75.4 kg (166 lb 3.6 oz)   07/05/18 76.9 kg (169 lb 9.6 oz)   06/29/18 76.2 kg (168 lb)   06/22/18 76.2 kg (168 lb)     BP Readings from Last 5 Encounters:   07/31/18 110/70   07/10/18 119/86   07/05/18 106/71   06/29/18 120/78   06/22/18 128/78       General-Well Nourished, Well developed  Eyes - PERRLA  Neck- supple, No mass  CV- regular rate and rhythm, no MRG  Lung- clear bilaterally  Abd- soft, +BS  Musc/skel - Norm strength and range of motion  Skin- warm and dry  Neuro - Alert & Oriented x 3, appropriate mood.    Medical problems and test results were reviewed with the patient today.     Results for orders placed or performed during the hospital encounter of 07/09/18   POC Activated Clotting Time   Result Value Ref Range    Activated Clotting Time  219 (H) 82 - 152 Seconds         Lab Results   Component Value Date    CHOL 172 05/25/2018    HDL 53 05/25/2018     05/25/2018       EKG:  (EKG/Tracing has been independently visualized by me and summarized below)  NSR, RVOT PVCs      ECG 12 Lead  Date/Time: 7/31/2018 2:25 PM  Performed by: RUBEN SAAVEDRA  Authorized by: RUBEN SAAVEDRA   Previous ECG: no previous ECG available  Rhythm: sinus rhythm  Rate: normal  BPM: 64  Conduction: conduction normal  ST Segments: ST segments normal  T Waves: T waves normal  QRS axis: normal  Clinical impression: normal ECG            ASSESSMENT and PLAN  1.  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  We discussed with him therapy options, " because of his extensive coronary artery disease, bradycardia and lung disease antiarrhythmic options are limited.  He underwent catheter-based ablation.  The ectopy had greatly been suppressed with sedation therefore paced mapping was used for ablation. Decrease amio to 100mg qam. Recheck monitor in the future.   2.  Bradycardia-We are hopeful that his sinus node function will improved post ablation of the ventricular ectopy      Return in about 6 weeks (around 9/11/2018).            Brandon Parker M.D., F.A.C.C, F.H.R.S.  Cardiology/Electrophysiology  07/31/18  2:36 PM

## 2018-08-24 RX ORDER — SERTRALINE HYDROCHLORIDE 100 MG/1
TABLET, FILM COATED ORAL
Qty: 30 TABLET | Refills: 0 | OUTPATIENT
Start: 2018-08-24

## 2018-08-31 ENCOUNTER — OFFICE VISIT (OUTPATIENT)
Dept: SLEEP MEDICINE | Facility: HOSPITAL | Age: 68
End: 2018-08-31

## 2018-08-31 VITALS
HEIGHT: 69 IN | SYSTOLIC BLOOD PRESSURE: 141 MMHG | WEIGHT: 172.6 LBS | OXYGEN SATURATION: 96 % | BODY MASS INDEX: 25.56 KG/M2 | DIASTOLIC BLOOD PRESSURE: 83 MMHG | HEART RATE: 56 BPM

## 2018-08-31 DIAGNOSIS — G47.33 OSA (OBSTRUCTIVE SLEEP APNEA): Primary | ICD-10-CM

## 2018-08-31 PROCEDURE — 99213 OFFICE O/P EST LOW 20 MIN: CPT | Performed by: NURSE PRACTITIONER

## 2018-09-04 RX ORDER — LOSARTAN POTASSIUM 50 MG/1
TABLET ORAL
Qty: 30 TABLET | Refills: 1 | OUTPATIENT
Start: 2018-09-04

## 2018-09-04 RX ORDER — LEVOTHYROXINE SODIUM 0.15 MG/1
TABLET ORAL
Qty: 30 TABLET | Refills: 1 | OUTPATIENT
Start: 2018-09-04

## 2018-09-18 ENCOUNTER — OFFICE VISIT (OUTPATIENT)
Dept: CARDIOLOGY | Facility: CLINIC | Age: 68
End: 2018-09-18

## 2018-09-18 VITALS
HEART RATE: 60 BPM | SYSTOLIC BLOOD PRESSURE: 128 MMHG | WEIGHT: 176 LBS | BODY MASS INDEX: 26.07 KG/M2 | DIASTOLIC BLOOD PRESSURE: 64 MMHG | HEIGHT: 69 IN

## 2018-09-18 DIAGNOSIS — I49.3 FREQUENT PVCS: Primary | ICD-10-CM

## 2018-09-18 DIAGNOSIS — R00.1 BRADYCARDIA: ICD-10-CM

## 2018-09-18 PROCEDURE — 93000 ELECTROCARDIOGRAM COMPLETE: CPT | Performed by: INTERNAL MEDICINE

## 2018-09-18 PROCEDURE — 99213 OFFICE O/P EST LOW 20 MIN: CPT | Performed by: INTERNAL MEDICINE

## 2018-09-18 NOTE — PROGRESS NOTES
Enio Tapia  1950  PCP: Troy Mckay MD    SUBJECTIVE:   Enio Tapia is a 68 y.o. male seen for a consultation visit regarding the following:     Chief Complaint:   Chief Complaint   Patient presents with   • Coronary Artery Disease          HPI:   No Palps, Feeling much better post ablation. No further PVC events    History:  This is a 68-year-old patient followed by Dr. Ribeiro.  He has a history of extensive coronary artery disease.  He most recently has found to have frequent ventricular ectopy for which he has been having increased shortness of breath and fatigue as a result. He was bite by a horse on May 19th, 2018. There was severe chest trauma from the bite. He noted that palp/pvcs, after have the horse bite trauma.  He's had extensive chest trauma that is also adding to his issues.        Cardiac PMH: (Old records have been reviewed and summarized below)  1. Coronary artery disease  a. As/P3 0.5 x 32 Taxus JOHANA mid RCA lesion 10/15/04  b. Normal stress echo Feb 2010  c. Echo February 2010 showed normal EF of 60% with trace TR and MRAna mary. [facility] 3/31/17For unstable angina: Diffuse 70% stenosis of mid LAD noted.  Stented with Xience 3.0 x 33 JOHANA reducing stenosis to 0%.  Also 70% discrete stenosis in proximal first diagonal stented with Xience Alpine 2.25 x 15 JOHANA reducing stenosis to 0%.  Previous stenting proximal to mid RCA widely patent.  Normal LVEF.  e. Echo 3/31/17: EF 55%.  Mild LVH noted.  2. Hypertension  3. Hyperlipidemia  4. Hypothyroidism  5. Anxiety disorder  6. Glaucoma  7. Osteoarthritis  8. GRABIEL with CPAP compliance.  9. 24 Hr Holter - May 2018 - Min 61, Mean 70, Max 108 - 25,091 PVCs - Mostly [place]  10. EP Study - Ectopy ablation - June 2018 - 1. Successful catheter mapping ablation of 2 ventricular foci. 2. Induction of ectopy was difficult.  Less than 10 ectopic beats were able to be induced over a 2 hour time period.      Past Medical History, Past Surgical History, Family  history, Social History, and Medications were all reviewed with the patient today and updated as necessary.     Current Outpatient Prescriptions   Medication Sig Dispense Refill   • acyclovir (ZOVIRAX) 400 MG tablet TAKE ONE TABLET BY MOUTH DAILY 30 tablet 2   • amLODIPine (NORVASC) 5 MG tablet Take 1 tablet by mouth Daily. (Patient taking differently: Take 5 mg by mouth Daily.) 30 tablet 11   • aspirin 81 MG EC tablet Take 81 mg by mouth Daily.     • carvedilol (COREG) 3.125 MG tablet TAKE ONE TABLET BY MOUTH TWICE A DAY WITH MEALS 60 tablet 5   • clopidogrel (PLAVIX) 75 MG tablet Take 1 tablet by mouth Daily. 30 tablet 1   • levothyroxine (SYNTHROID, LEVOTHROID) 150 MCG tablet Take 1 tablet by mouth Daily. 90 tablet 2   • losartan (COZAAR) 50 MG tablet Take 1 tablet by mouth Daily. 90 tablet 2   • Multiple Vitamins-Minerals (CENTRUM SILVER PO) Take 1 tablet by mouth Daily.     • pravastatin (PRAVACHOL) 40 MG tablet Take 2 tablets by mouth Daily. (Patient taking differently: Take 80 mg by mouth Every Night.) 90 tablet 3   • sertraline (ZOLOFT) 100 MG tablet TAKE ONE TABLET BY MOUTH DAILY 30 tablet 0   • temazepam (RESTORIL) 15 MG capsule Take 1 capsule by mouth At Night As Needed for Sleep. 30 capsule 3   • travoprost, BAK free, (TRAVATAN) 0.004 % solution ophthalmic solution Administer 1 drop to both eyes Every Evening. in affected eye(s)      • isosorbide mononitrate (IMDUR) 60 MG 24 hr tablet Take 1 tablet by mouth Daily. (Patient taking differently: Take 60 mg by mouth Daily.) 30 tablet 11     No current facility-administered medications for this visit.      Allergies   Allergen Reactions   • Statins Myalgia     ESPECIALLY LIPITOR         Past Medical History:   Diagnosis Date   • Arthritis    • BPH (benign prostatic hyperplasia)    • Chest wall hematoma    • Coronary artery disease involving native coronary artery of native heart with unstable angina pectoris (CMS/HCC) 3/31/2017   • Depression    • Disease of  thyroid gland    • Enlarged prostate    • Frequent PVCs    • Glaucoma    • Hematoma     right chest   • [place] (hard of hearing)    • Hyperlipidemia    • Hypertension    • GRABIEL on CPAP      Past Surgical History:   Procedure Laterality Date   • CARDIAC CATHETERIZATION     • CARDIAC CATHETERIZATION N/A 3/31/2017    Procedure: Left Heart Cath;  Surgeon: Enio Benavidez MD;  [facility]:  [facility];  Service:    • CARDIAC ELECTROPHYSIOLOGY PROCEDURE N/A 7/9/2018    Procedure: Ablation PVC;  Surgeon: Brandon Parker MD;  Location:  [facility];  Service: Cardiovascular   • CATARACT EXTRACTION Bilateral    • COLONOSCOPY     • CORONARY STENT PLACEMENT  10/16/2004, 2017   • INCISION AND DRAINAGE HEMATOMA  06/08/2018    Chest Wall-right side    • INCISION AND DRAINAGE LEG Right 6/8/2018    Procedure: INCISION AND DRAINAGE RIGHT TRUNK HEMATOMA;  Surgeon: Gerardo Gutierrez MD;  Location:  [facility];  Service: Cardiothoracic   • TONSILLECTOMY     • VASECTOMY  08/1998     Family History   Problem Relation Age of Onset   • Heart disease Mother    • Heart failure Mother    • Pneumonia Father      Social History   Substance Use Topics   • Smoking status: Never Smoker   • Smokeless tobacco: Former User     Types: Chew, Snuff     Quit date: 01/2002   • Alcohol use 7.2 oz/week     12 Cans of beer per week      Comment: occas       ROS:  Review of Symptoms:  General: no recent weight loss/gain, weakness or fatigue  Skin: no rashes, lumps, or other skin changes  HEENT: no dizziness, lightheadedness, or vision changes  Respiratory: no cough or hemoptysis  Cardiovascular: + palpitations, and tachycardia  Gastrointestinal: no black/tarry stools or diarrhea  Urinary: no change in frequency or urgency  Peripheral Vascular: no claudication or leg cramps  Musculoskeletal: no muscle or joint pain/stiffness  Psychiatric: no depression or excessive stress  Neurological: no sensory or motor loss, no syncope  Hematologic:  "no anemia, easy bruising or bleeding  Endocrine: no thyroid problems, nor heat or cold intolerance       PHYSICAL EXAM:   /64 (BP Location: Left arm, Patient Position: Sitting)   Pulse 60   Ht 175.3 cm (69\")   Wt 79.8 kg (176 lb)   BMI 25.99 kg/m²      Wt Readings from Last 5 Encounters:   09/18/18 79.8 kg (176 lb)   08/31/18 78.3 kg (172 lb 9.6 oz)   07/31/18 76.2 kg (168 lb)   07/09/18 75.4 kg (166 lb 3.6 oz)   07/05/18 76.9 kg (169 lb 9.6 oz)     BP Readings from Last 5 Encounters:   09/18/18 128/64   08/31/18 141/83   07/31/18 110/70   07/10/18 119/86   07/05/18 106/71       General-Well Nourished, Well developed  Eyes - PERRLA  Neck- supple, No mass  CV- regular rate and rhythm, no MRG  Lung- clear bilaterally  Abd- soft, +BS  Musc/skel - Norm strength and range of motion  Skin- warm and dry  Neuro - Alert & Oriented x 3, appropriate mood.    Medical problems and test results were reviewed with the patient today.     Results for orders placed or performed during the hospital encounter of 07/09/18   POC Activated Clotting Time   Result Value Ref Range    Activated Clotting Time  219 (H) 82 - 152 Seconds         Lab Results   Component Value Date    CHOL 172 05/25/2018    HDL 53 05/25/2018     05/25/2018       EKG:  (EKG/Tracing has been independently visualized by me and summarized below)  NSR, RVOT PVCs      ECG 12 Lead  Date/Time: 9/18/2018 11:58 AM  Performed by: RUBEN SAAVEDRA  Authorized by: RUBEN SAAVEDRA   Rhythm: sinus rhythm  Rate: normal  BPM: 96  Conduction: conduction normal  ST Segments: ST segments normal  QRS axis: normal  Clinical impression: normal ECG            ASSESSMENT and PLAN  1.  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  We discussed with him therapy options, because of his extensive coronary artery disease, bradycardia and lung disease antiarrhythmic options are limited.  He underwent " catheter-based ablation.  The ectopy had greatly been suppressed with sedation therefore paced mapping was used for ablation. Will stop Amio today. Recheck monitor in the future.   2.  Bradycardia-We are hopeful that his sinus node function will improved post ablation of the ventricular ectopy      Return in about 6 weeks (around 10/30/2018).            Brandon Parker M.D., FGABRIELAC, F.H.R.S.  Cardiology/Electrophysiology  09/18/18  11:59 AM

## 2018-10-19 ENCOUNTER — OFFICE VISIT (OUTPATIENT)
Dept: FAMILY MEDICINE CLINIC | Facility: CLINIC | Age: 68
End: 2018-10-19

## 2018-10-19 VITALS
WEIGHT: 177.8 LBS | RESPIRATION RATE: 16 BRPM | BODY MASS INDEX: 26.33 KG/M2 | OXYGEN SATURATION: 97 % | SYSTOLIC BLOOD PRESSURE: 124 MMHG | HEART RATE: 71 BPM | DIASTOLIC BLOOD PRESSURE: 82 MMHG | HEIGHT: 69 IN

## 2018-10-19 DIAGNOSIS — Z23 NEED FOR INFLUENZA VACCINATION: Primary | ICD-10-CM

## 2018-10-19 DIAGNOSIS — E78.01 FAMILIAL HYPERCHOLESTEROLEMIA: ICD-10-CM

## 2018-10-19 DIAGNOSIS — E03.9 ACQUIRED HYPOTHYROIDISM: ICD-10-CM

## 2018-10-19 DIAGNOSIS — I10 ESSENTIAL HYPERTENSION: ICD-10-CM

## 2018-10-19 LAB
ALBUMIN SERPL-MCNC: 4.21 G/DL (ref 3.2–4.8)
ALBUMIN/GLOB SERPL: 2 G/DL (ref 1.5–2.5)
ALP SERPL-CCNC: 64 U/L (ref 25–100)
ALT SERPL W P-5'-P-CCNC: 35 U/L (ref 7–40)
ANION GAP SERPL CALCULATED.3IONS-SCNC: 5 MMOL/L (ref 3–11)
ARTICHOKE IGE QN: 150 MG/DL (ref 0–130)
AST SERPL-CCNC: 32 U/L (ref 0–33)
BASOPHILS # BLD AUTO: 0.04 10*3/MM3 (ref 0–0.2)
BASOPHILS NFR BLD AUTO: 0.8 % (ref 0–1)
BILIRUB SERPL-MCNC: 0.6 MG/DL (ref 0.3–1.2)
BUN BLD-MCNC: 21 MG/DL (ref 9–23)
BUN/CREAT SERPL: 21 (ref 7–25)
CALCIUM SPEC-SCNC: 9.2 MG/DL (ref 8.7–10.4)
CHLORIDE SERPL-SCNC: 104 MMOL/L (ref 99–109)
CHOLEST SERPL-MCNC: 219 MG/DL (ref 0–200)
CO2 SERPL-SCNC: 30 MMOL/L (ref 20–31)
CREAT BLD-MCNC: 1 MG/DL (ref 0.6–1.3)
DEPRECATED RDW RBC AUTO: 51.9 FL (ref 37–54)
EOSINOPHIL # BLD AUTO: 0.17 10*3/MM3 (ref 0–0.3)
EOSINOPHIL NFR BLD AUTO: 3.5 % (ref 0–3)
ERYTHROCYTE [DISTWIDTH] IN BLOOD BY AUTOMATED COUNT: 14.8 % (ref 11.3–14.5)
GFR SERPL CREATININE-BSD FRML MDRD: 74 ML/MIN/1.73
GLOBULIN UR ELPH-MCNC: 2.1 GM/DL
GLUCOSE BLD-MCNC: 99 MG/DL (ref 70–100)
HCT VFR BLD AUTO: 40.6 % (ref 38.9–50.9)
HDLC SERPL-MCNC: 65 MG/DL (ref 40–60)
HGB BLD-MCNC: 13.7 G/DL (ref 13.1–17.5)
IMM GRANULOCYTES # BLD: 0.02 10*3/MM3 (ref 0–0.03)
IMM GRANULOCYTES NFR BLD: 0.4 % (ref 0–0.6)
LYMPHOCYTES # BLD AUTO: 1.25 10*3/MM3 (ref 0.6–4.8)
LYMPHOCYTES NFR BLD AUTO: 25.7 % (ref 24–44)
MCH RBC QN AUTO: 32.2 PG (ref 27–31)
MCHC RBC AUTO-ENTMCNC: 33.7 G/DL (ref 32–36)
MCV RBC AUTO: 95.5 FL (ref 80–99)
MONOCYTES # BLD AUTO: 0.39 10*3/MM3 (ref 0–1)
MONOCYTES NFR BLD AUTO: 8 % (ref 0–12)
NEUTROPHILS # BLD AUTO: 2.99 10*3/MM3 (ref 1.5–8.3)
NEUTROPHILS NFR BLD AUTO: 61.6 % (ref 41–71)
PLATELET # BLD AUTO: 186 10*3/MM3 (ref 150–450)
PMV BLD AUTO: 9.3 FL (ref 6–12)
POTASSIUM BLD-SCNC: 5.1 MMOL/L (ref 3.5–5.5)
PROT SERPL-MCNC: 6.3 G/DL (ref 5.7–8.2)
RBC # BLD AUTO: 4.25 10*6/MM3 (ref 4.2–5.76)
SODIUM BLD-SCNC: 139 MMOL/L (ref 132–146)
TRIGL SERPL-MCNC: 108 MG/DL (ref 0–150)
TSH SERPL DL<=0.05 MIU/L-ACNC: 87.52 MIU/ML (ref 0.35–5.35)
WBC NRBC COR # BLD: 4.86 10*3/MM3 (ref 3.5–10.8)

## 2018-10-19 PROCEDURE — 99214 OFFICE O/P EST MOD 30 MIN: CPT | Performed by: FAMILY MEDICINE

## 2018-10-19 PROCEDURE — 80061 LIPID PANEL: CPT | Performed by: FAMILY MEDICINE

## 2018-10-19 PROCEDURE — 85025 COMPLETE CBC W/AUTO DIFF WBC: CPT | Performed by: FAMILY MEDICINE

## 2018-10-19 PROCEDURE — 90662 IIV NO PRSV INCREASED AG IM: CPT | Performed by: FAMILY MEDICINE

## 2018-10-19 PROCEDURE — 90471 IMMUNIZATION ADMIN: CPT | Performed by: FAMILY MEDICINE

## 2018-10-19 PROCEDURE — 36415 COLL VENOUS BLD VENIPUNCTURE: CPT | Performed by: FAMILY MEDICINE

## 2018-10-19 PROCEDURE — 84443 ASSAY THYROID STIM HORMONE: CPT | Performed by: FAMILY MEDICINE

## 2018-10-19 PROCEDURE — 80053 COMPREHEN METABOLIC PANEL: CPT | Performed by: FAMILY MEDICINE

## 2018-10-19 RX ORDER — LOSARTAN POTASSIUM 50 MG/1
50 TABLET ORAL DAILY
Qty: 90 TABLET | Refills: 3 | Status: SHIPPED | OUTPATIENT
Start: 2018-10-19 | End: 2019-05-31 | Stop reason: SDUPTHER

## 2018-10-19 RX ORDER — ACYCLOVIR 400 MG/1
400 TABLET ORAL DAILY
Qty: 30 TABLET | Refills: 5 | Status: SHIPPED | OUTPATIENT
Start: 2018-10-19 | End: 2019-02-06 | Stop reason: SDUPTHER

## 2018-10-19 RX ORDER — LEVOTHYROXINE SODIUM 0.15 MG/1
150 TABLET ORAL DAILY
Qty: 90 TABLET | Refills: 2 | Status: SHIPPED | OUTPATIENT
Start: 2018-10-19 | End: 2019-05-31 | Stop reason: SDUPTHER

## 2018-10-19 RX ORDER — SERTRALINE HYDROCHLORIDE 100 MG/1
100 TABLET, FILM COATED ORAL DAILY
Qty: 30 TABLET | Refills: 5 | Status: SHIPPED | OUTPATIENT
Start: 2018-10-19 | End: 2019-04-22 | Stop reason: SDUPTHER

## 2018-10-19 RX ORDER — CLOPIDOGREL BISULFATE 75 MG/1
75 TABLET ORAL DAILY
Qty: 30 TABLET | Refills: 5 | Status: SHIPPED | OUTPATIENT
Start: 2018-10-19 | End: 2019-01-25 | Stop reason: SDUPTHER

## 2018-10-19 NOTE — PROGRESS NOTES
Subjective   Enio Tapia is a 68 y.o. male.     Hypertension   This is a chronic problem. The current episode started more than 1 year ago. The problem is unchanged. The problem is controlled. Pertinent negatives include no chest pain, headaches, palpitations or shortness of breath. Risk factors for coronary artery disease include family history and male gender. Current antihypertension treatment includes beta blockers. The current treatment provides significant improvement. There are no compliance problems.  Hypertensive end-organ damage includes CAD/MI. There is no history of angina or kidney disease.   Hypothyroidism   This is a chronic problem. The problem occurs constantly. The problem has been unchanged. Associated symptoms include arthralgias and myalgias. Pertinent negatives include no chest pain, congestion, coughing, fatigue, headaches, nausea, rash, sore throat or weakness. The treatment provided significant relief.   Hyperlipidemia   This is a chronic (Last laboratory data showed an elevated triglycerides a she may be willing to try a fiberate or zetia) problem. The current episode started more than 1 year ago. The problem is uncontrolled. Exacerbating diseases include hypothyroidism. Factors aggravating his hyperlipidemia include fatty foods. Associated symptoms include myalgias. Pertinent negatives include no chest pain or shortness of breath. Current antihyperlipidemic treatment includes diet change (Unable to tolerate statins gave it another try was not able to tolerate). Compliance problems include medication side effects.  Risk factors for coronary artery disease include male sex and dyslipidemia.   Coronary Artery Disease   Presents for follow-up (recent ablation and stenting) visit. Pertinent negatives include no chest pain, chest tightness, dizziness, leg swelling, palpitations or shortness of breath. Risk factors include hyperlipidemia. The symptoms have been resolved. Compliance with diet  "is good. Compliance with exercise is variable.        The following portions of the patient's history were reviewed and updated as appropriate: allergies, current medications, past social history and problem list.    Review of Systems   Constitutional: Negative for fatigue and unexpected weight change.   HENT: Negative for congestion and sore throat.    Respiratory: Negative for cough, chest tightness and shortness of breath.    Cardiovascular: Negative for chest pain, palpitations and leg swelling.        Chest wall pain from injury by a horse.   Gastrointestinal: Negative for abdominal distention, diarrhea and nausea.   Genitourinary: Negative for frequency and hematuria.   Musculoskeletal: Positive for arthralgias and myalgias.   Skin: Negative for color change and rash.   Neurological: Negative for dizziness, syncope, weakness and headaches.       Objective   /82   Pulse 71   Resp 16   Ht 175.3 cm (69\")   Wt 80.6 kg (177 lb 12.8 oz)   SpO2 97%   BMI 26.26 kg/m²   Physical Exam   Constitutional: He is oriented to person, place, and time. He appears well-developed and well-nourished. He is cooperative.   HENT:   Head: Normocephalic.   Right Ear: External ear normal.   Left Ear: External ear normal.   Nose: Nose normal.   Mouth/Throat: Oropharynx is clear and moist.   Eyes: Pupils are equal, round, and reactive to light. Conjunctivae are normal. No scleral icterus.   Neck: Neck supple. No JVD present. Carotid bruit is not present. No thyromegaly present.   Cardiovascular: Normal rate, regular rhythm and normal heart sounds.    Pulmonary/Chest: Effort normal and breath sounds normal. He has no wheezes.   Abdominal: There is no hepatosplenomegaly.   Musculoskeletal: Normal range of motion. He exhibits no edema.   Lymphadenopathy:     He has no cervical adenopathy.   Neurological: He is alert and oriented to person, place, and time.   No focal deficits no lateralizing signs   Skin: Skin is warm and dry. No " rash noted.   Psychiatric: He has a normal mood and affect. Cognition and memory are normal.   Nursing note and vitals reviewed.      Assessment/Plan   Problem List Items Addressed This Visit     Hyperlipidemia    Relevant Orders    Comprehensive Metabolic Panel    Lipid Panel    Hypertension    Relevant Medications    losartan (COZAAR) 50 MG tablet    Other Relevant Orders    Comprehensive Metabolic Panel    CBC & Differential    CBC Auto Differential    Hypothyroidism    Relevant Medications    levothyroxine (SYNTHROID, LEVOTHROID) 150 MCG tablet    Other Relevant Orders    TSH      Other Visit Diagnoses     Need for influenza vaccination    -  Primary    Relevant Orders    Flu Vaccine High Dose PF 65YR+ ([id number]) (Completed)          New Medications Ordered This Visit   Medications   • clopidogrel (PLAVIX) 75 MG tablet     Sig: Take 1 tablet by mouth Daily.     Dispense:  30 tablet     Refill:  5   • levothyroxine (SYNTHROID, LEVOTHROID) 150 MCG tablet     Sig: Take 1 tablet by mouth Daily.     Dispense:  90 tablet     Refill:  2   • losartan (COZAAR) 50 MG tablet     Sig: Take 1 tablet by mouth Daily.     Dispense:  90 tablet     Refill:  3   • sertraline (ZOLOFT) 100 MG tablet     Sig: Take 1 tablet by mouth Daily.     Dispense:  30 tablet     Refill:  5   • acyclovir (ZOVIRAX) 400 MG tablet     Sig: Take 1 tablet by mouth Daily. Take no more than 5 doses a day.     Dispense:  30 tablet     Refill:  5

## 2018-10-22 RX ORDER — CARVEDILOL 3.12 MG/1
TABLET ORAL
Qty: 60 TABLET | Refills: 11 | Status: SHIPPED | OUTPATIENT
Start: 2018-10-22 | End: 2019-05-31

## 2018-10-23 RX ORDER — ACYCLOVIR 400 MG/1
TABLET ORAL
Qty: 30 TABLET | Refills: 1 | OUTPATIENT
Start: 2018-10-23

## 2018-10-30 ENCOUNTER — OFFICE VISIT (OUTPATIENT)
Dept: CARDIOLOGY | Facility: CLINIC | Age: 68
End: 2018-10-30

## 2018-10-30 VITALS
HEART RATE: 66 BPM | DIASTOLIC BLOOD PRESSURE: 76 MMHG | WEIGHT: 176 LBS | HEIGHT: 71 IN | OXYGEN SATURATION: 97 % | SYSTOLIC BLOOD PRESSURE: 132 MMHG | BODY MASS INDEX: 24.64 KG/M2

## 2018-10-30 DIAGNOSIS — I49.3 FREQUENT UNIFOCAL PVCS: Primary | ICD-10-CM

## 2018-10-30 DIAGNOSIS — R00.1 BRADYCARDIA: ICD-10-CM

## 2018-10-30 PROCEDURE — 99213 OFFICE O/P EST LOW 20 MIN: CPT | Performed by: INTERNAL MEDICINE

## 2018-10-30 NOTE — PROGRESS NOTES
Enio Tapia  1950  PCP: Troy Mckay MD    SUBJECTIVE:   Enio Tapia is a 68 y.o. male seen for a consultation visit regarding the following:     Chief Complaint:   Chief Complaint   Patient presents with   • PVC's          HPI:   No Palps, Feeling much better post ablation. No further PVC events. No Bradycardia. Off Amio    History:  This is a 68-year-old patient followed by Dr. Ribeiro.  He has a history of extensive coronary artery disease.  He most recently has found to have frequent ventricular ectopy for which he has been having increased shortness of breath and fatigue as a result. He was bite by a horse on May 19th, 2018. There was severe chest trauma from the bite. He noted that palp/pvcs, after have the horse bite trauma.  He's had extensive chest trauma that is also adding to his issues.        Cardiac PMH: (Old records have been reviewed and summarized below)  1. Coronary artery disease  a. As/P3 0.5 x 32 Taxus JOHANA mid RCA lesion 10/15/04  b. Normal stress echo Feb 2010  c. Echo February 2010 showed normal EF of 60% with trace TR and MR.  [facility]. [facility] 3/31/17For unstable angina: Diffuse 70% stenosis of mid LAD noted.  Stented with Xience 3.0 x 33 JOHANA reducing stenosis to 0%.  Also 70% discrete stenosis in proximal first diagonal stented with Xience Alpine 2.25 x 15 JOHANA reducing stenosis to 0%.  Previous stenting proximal to mid RCA widely patent.  Normal LVEF.  e. Echo 3/31/17: EF 55%.  Mild LVH noted.  2. Hypertension  3. Hyperlipidemia  4. Hypothyroidism  5. Anxiety disorder  6. Glaucoma  7. Osteoarthritis  8. GRABIEL with CPAP compliance.  9. 24 Hr Holter - May 2018 - Min 61, Mean 70, Max 108 - 25,091 PVCs - Mostly [place]  10. EP Study - Ectopy ablation - June 2018 - 1. Successful catheter mapping ablation of 2 ventricular foci. 2. Induction of ectopy was difficult.  Less than 10 ectopic beats were able to be induced over a 2 hour time period.      Past Medical History, Past Surgical History,  Family history, Social History, and Medications were all reviewed with the patient today and updated as necessary.     Current Outpatient Prescriptions   Medication Sig Dispense Refill   • acyclovir (ZOVIRAX) 400 MG tablet Take 1 tablet by mouth Daily. Take no more than 5 doses a day. 30 tablet 5   • amLODIPine (NORVASC) 5 MG tablet Take 1 tablet by mouth Daily. (Patient taking differently: Take 5 mg by mouth Daily.) 30 tablet 11   • aspirin 81 MG EC tablet Take 81 mg by mouth Daily.     • carvedilol (COREG) 3.125 MG tablet TAKE ONE TABLET BY MOUTH TWICE A DAY WITH A MEAL 60 tablet 11   • clopidogrel (PLAVIX) 75 MG tablet Take 1 tablet by mouth Daily. 30 tablet 5   • isosorbide mononitrate (IMDUR) 60 MG 24 hr tablet Take 1 tablet by mouth Daily. (Patient taking differently: Take 60 mg by mouth Daily.) 30 tablet 11   • levothyroxine (SYNTHROID, LEVOTHROID) 150 MCG tablet Take 1 tablet by mouth Daily. 90 tablet 2   • losartan (COZAAR) 50 MG tablet Take 1 tablet by mouth Daily. 90 tablet 3   • Multiple Vitamins-Minerals (CENTRUM SILVER PO) Take 1 tablet by mouth Daily.     • pravastatin (PRAVACHOL) 40 MG tablet Take 2 tablets by mouth Daily. (Patient taking differently: Take 80 mg by mouth Every Night.) 90 tablet 3   • sertraline (ZOLOFT) 100 MG tablet Take 1 tablet by mouth Daily. 30 tablet 5   • travoprost, PAUL free, (TRAVATAN) 0.004 % solution ophthalmic solution Administer 1 drop to both eyes Every Evening. in affected eye(s)        No current facility-administered medications for this visit.      Allergies   Allergen Reactions   • Statins Myalgia     ESPECIALLY LIPITOR         Past Medical History:   Diagnosis Date   • Arthritis    • BPH (benign prostatic hyperplasia)    • Chest wall hematoma    • Coronary artery disease involving native coronary artery of native heart with unstable angina pectoris (CMS/HCC) 3/31/2017   • Depression    • Disease of thyroid gland    • Enlarged prostate    • Frequent PVCs    •  "Glaucoma    • Hematoma     right chest   • Telida (hard of hearing)    • Hyperlipidemia    • Hypertension    • GRABIEL on CPAP      Past Surgical History:   Procedure Laterality Date   • CARDIAC CATHETERIZATION     • CARDIAC CATHETERIZATION N/A 3/31/2017    Procedure: Left Heart Cath;  Surgeon: Enio Benavidez MD;  Location:  [facility];  Service:    • CARDIAC ELECTROPHYSIOLOGY PROCEDURE N/A 7/9/2018    Procedure: Ablation PVC;  Surgeon: Brandon Parker MD;  Location:  [facility] EP INV[facility]IVE [facility];  Service: Cardiovascular   • CATARACT EXTRACTION Bilateral    • COLONOSCOPY     • CORONARY STENT PLACEMENT  10/16/2004, 2017   • INCISION AND DRAINAGE HEMATOMA  06/08/2018    Chest Wall-right side    • INCISION AND DRAINAGE LEG Right 6/8/2018    Procedure: INCISION AND DRAINAGE RIGHT TRUNK HEMATOMA;  Surgeon: Gerardo Gutierrez MD;  Location:  [facility] OR;  Service: Cardiothoracic   • TONSILLECTOMY     • VASECTOMY  08/1998     Family History   Problem Relation Age of Onset   • Heart disease Mother    • Heart failure Mother    • Pneumonia Father      Social History   Substance Use Topics   • Smoking status: Never Smoker   • Smokeless tobacco: Former User     Types: Chew, Snuff     Quit date: 01/2002   • Alcohol use 7.2 oz/week     12 Cans of beer per week      Comment: occas            PHYSICAL EXAM:   /76 (BP Location: Right arm, Patient Position: Sitting)   Pulse 66   Ht 180.3 cm (71\")   Wt 79.8 kg (176 lb)   SpO2 97%   BMI 24.55 kg/m²      Wt Readings from Last 5 Encounters:   10/30/18 79.8 kg (176 lb)   10/19/18 80.6 kg (177 lb 12.8 oz)   09/18/18 79.8 kg (176 lb)   08/31/18 78.3 kg (172 lb 9.6 oz)   07/31/18 76.2 kg (168 lb)     BP Readings from Last 5 Encounters:   10/30/18 132/76   10/19/18 124/82   09/18/18 128/64   08/31/18 141/83   07/31/18 110/70       General-Well Nourished, Well developed  Eyes - PERRLA  Neck- supple, No mass  CV- regular rate and rhythm, no MRG  Lung- clear bilaterally  Abd- " soft, +BS  Musc/skel - Norm strength and range of motion  Skin- warm and dry  Neuro - Alert & Oriented x 3, appropriate mood.    Medical problems and test results were reviewed with the patient today.     Results for orders placed or performed in visit on 10/19/18   Comprehensive Metabolic Panel   Result Value Ref Range    Glucose 99 70 - 100 mg/dL    BUN 21 9 - 23 mg/dL    Creatinine 1.00 0.60 - 1.30 mg/dL    Sodium 139 132 - 146 mmol/L    Potassium 5.1 3.5 - 5.5 mmol/L    Chloride 104 99 - 109 mmol/L    CO2 30.0 20.0 - 31.0 mmol/L    Calcium 9.2 8.7 - 10.4 mg/dL    Total Protein 6.3 5.7 - 8.2 g/dL    Albumin 4.21 3.20 - 4.80 g/dL    ALT (SGPT) 35 7 - 40 U/L    AST (SGOT) 32 0 - 33 U/L    Alkaline Phosphatase 64 25 - 100 U/L    Total Bilirubin 0.6 0.3 - 1.2 mg/dL    eGFR Non African Amer 74 >60 mL/min/1.73    Globulin 2.1 gm/dL    A/G Ratio 2.0 1.5 - 2.5 g/dL    BUN/Creatinine Ratio 21.0 7.0 - 25.0    Anion Gap 5.0 3.0 - 11.0 mmol/L   Lipid Panel   Result Value Ref Range    Total Cholesterol 219 (H) 0 - 200 mg/dL    Triglycerides 108 0 - 150 mg/dL    HDL Cholesterol 65 (H) 40 - 60 mg/dL    LDL Cholesterol  150 (H) 0 - 130 mg/dL   TSH   Result Value Ref Range    TSH 87.518 (H) 0.350 - 5.350 mIU/mL   CBC Auto Differential   Result Value Ref Range    WBC 4.86 3.50 - 10.80 10*3/mm3    RBC 4.25 4.20 - 5.76 10*6/mm3    Hemoglobin 13.7 13.1 - 17.5 g/dL    Hematocrit 40.6 38.9 - 50.9 %    MCV 95.5 80.0 - 99.0 fL    MCH 32.2 (H) 27.0 - 31.0 pg    MCHC 33.7 32.0 - 36.0 g/dL    RDW 14.8 (H) 11.3 - 14.5 %    RDW-SD 51.9 37.0 - 54.0 fl    MPV 9.3 6.0 - 12.0 fL    Platelets 186 150 - 450 10*3/mm3    Neutrophil % 61.6 41.0 - 71.0 %    Lymphocyte % 25.7 24.0 - 44.0 %    Monocyte % 8.0 0.0 - 12.0 %    Eosinophil % 3.5 (H) 0.0 - 3.0 %    Basophil % 0.8 0.0 - 1.0 %    Immature Grans % 0.4 0.0 - 0.6 %    Neutrophils, Absolute 2.99 1.50 - 8.30 10*3/mm3    Lymphocytes, Absolute 1.25 0.60 - 4.80 10*3/mm3    Monocytes, Absolute 0.39 0.00  - 1.00 10*3/mm3    Eosinophils, Absolute 0.17 0.00 - 0.30 10*3/mm3    Basophils, Absolute 0.04 0.00 - 0.20 10*3/mm3    Immature Grans, Absolute 0.02 0.00 - 0.03 10*3/mm3         Lab Results   Component Value Date    CHOL 219 (H) 10/19/2018    HDL 65 (H) 10/19/2018     (H) 10/19/2018       EKG:  (EKG/Tracing has been independently visualized by me and summarized below)  Procedures    ASSESSMENT and PLAN  1.  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  We discussed with him therapy options, because of his extensive coronary artery disease, bradycardia and lung disease antiarrhythmic options are limited.  He underwent catheter-based ablation.  The ectopy had greatly been suppressed with sedation therefore paced mapping was used for ablation. We stopped Amio. No further ectopy detected   2.  Bradycardia- sinus node function has improved post ablation of the ventricular ectopy      Return if symptoms worsen or fail to improve.    Follow up with Dr. Ribeiro as scheduled        Brandon Parker M.D., F.A.C.C, F.H.R.S.  Cardiology/Electrophysiology  10/30/18  3:49 PM

## 2018-12-20 ENCOUNTER — TELEPHONE (OUTPATIENT)
Dept: CARDIAC SURGERY | Facility: CLINIC | Age: 68
End: 2018-12-20

## 2019-01-25 ENCOUNTER — LAB (OUTPATIENT)
Dept: LAB | Facility: HOSPITAL | Age: 69
End: 2019-01-25

## 2019-01-25 ENCOUNTER — OFFICE VISIT (OUTPATIENT)
Dept: CARDIOLOGY | Facility: CLINIC | Age: 69
End: 2019-01-25

## 2019-01-25 VITALS
HEIGHT: 69 IN | HEART RATE: 63 BPM | WEIGHT: 175.8 LBS | OXYGEN SATURATION: 96 % | BODY MASS INDEX: 26.04 KG/M2 | DIASTOLIC BLOOD PRESSURE: 86 MMHG | SYSTOLIC BLOOD PRESSURE: 162 MMHG

## 2019-01-25 DIAGNOSIS — E78.01 FAMILIAL HYPERCHOLESTEROLEMIA: ICD-10-CM

## 2019-01-25 DIAGNOSIS — I10 ESSENTIAL HYPERTENSION: ICD-10-CM

## 2019-01-25 DIAGNOSIS — I49.3 FREQUENT UNIFOCAL PVCS: ICD-10-CM

## 2019-01-25 DIAGNOSIS — I25.110 CORONARY ARTERY DISEASE INVOLVING NATIVE CORONARY ARTERY OF NATIVE HEART WITH UNSTABLE ANGINA PECTORIS (HCC): Primary | ICD-10-CM

## 2019-01-25 LAB
ARTICHOKE IGE QN: 187 MG/DL (ref 0–130)
CHOLEST SERPL-MCNC: 253 MG/DL (ref 0–200)
HDLC SERPL-MCNC: 66 MG/DL (ref 40–60)
TRIGL SERPL-MCNC: 106 MG/DL (ref 0–150)

## 2019-01-25 PROCEDURE — 99214 OFFICE O/P EST MOD 30 MIN: CPT | Performed by: INTERNAL MEDICINE

## 2019-01-25 PROCEDURE — 36415 COLL VENOUS BLD VENIPUNCTURE: CPT

## 2019-01-25 PROCEDURE — 80061 LIPID PANEL: CPT

## 2019-01-25 RX ORDER — AMLODIPINE BESYLATE 10 MG/1
10 TABLET ORAL
Qty: 90 TABLET | Refills: 3 | Status: SHIPPED | OUTPATIENT
Start: 2019-01-25 | End: 2019-11-22

## 2019-01-25 RX ORDER — CLOPIDOGREL BISULFATE 75 MG/1
75 TABLET ORAL DAILY
Qty: 90 TABLET | Refills: 3 | Status: SHIPPED | OUTPATIENT
Start: 2019-01-25 | End: 2020-02-18

## 2019-01-25 NOTE — PROGRESS NOTES
Encounter Date:01/25/2019      Patient ID: Enio Tapia is a 68 y.o. male.    Chief Complaint: Coronary Artery Disease      PROBLEM LIST:  1. Coronary artery disease:  a. As/P3 0.5 x 32 Taxus JOHANA mid RCA lesion 10/15/04\  b. Normal stress echo Feb 2010  c. Echo, 02/2010 showed normal EF of 60% with trace TR and MR. mary. [facility], 03/31/2017, for unstable angina: Diffuse stenosis of mid-LAD treated with Xience 3.0 x 33 JOHANA reducing 70% stenosis to 0%. Discrete stenosis in proximal first diagonal treated with Xience Alpine 2.25 x 15 JOHANA reducing 70% stenosis to 0%. Previous stenting proximal to mid RCA widely patent. Normal EF.  e. Echo, 03/31/2017: EF 55%. Mild LVH noted.  2. Palpitations:  a. 24h Holter, 05/25/2018: Sinus rhythm with rare APCs. Very frequent PVC.  b. Seen by electrophysiology, PVC ablation and consideration.  c. EP Study - Ectopy ablation - June 2018 - Successful catheter mapping ablation of 2 ventricular foci. Less than 10 ectopic beats were able to be induced over a 2 hour time period.  3. Hypertension.  4. Hyperlipidemia.  5. Hypothyroidism.  6. Anxiety disorder.  7. Glaucoma.  8. Osteoarthritis.  9. GRABIEL with CPAP compliance.      History of Present Illness  Patient presents today for 6 month follow-up with a history of CAD, palpitations, and cardiac risk factors. Since last visit, he has been doing well overall from a cardiovascular standpoint. He underwent ventricular tachycardia ablation in June 2018 with Dr. Parker. Denies chest pain, shortness of breath, leg swelling, palpitations, and syncope. Patient has been experiencing myalgias on pravastatin and has not been taking it. He admits he did not take lisinopril for about 5 days as he had run out, and has now been back on for two days. He wonders whether this might explain his high BP in the office today.    Allergies   Allergen Reactions   • Pravastatin Myalgia   • Statins Myalgia     ESPECIALLY LIPITOR         Current Outpatient  Medications:   •  acyclovir (ZOVIRAX) 400 MG tablet, Take 1 tablet by mouth Daily. Take no more than 5 doses a day., Disp: 30 tablet, Rfl: 5  •  amLODIPine (NORVASC) 5 MG tablet, Take 1 tablet by mouth Daily. (Patient taking differently: Take 5 mg by mouth Daily.), Disp: 30 tablet, Rfl: 11  •  aspirin 81 MG EC tablet, Take 81 mg by mouth Daily., Disp: , Rfl:   •  carvedilol (COREG) 3.125 MG tablet, TAKE ONE TABLET BY MOUTH TWICE A DAY WITH A MEAL, Disp: 60 tablet, Rfl: 11  •  clopidogrel (PLAVIX) 75 MG tablet, Take 1 tablet by mouth Daily., Disp: 30 tablet, Rfl: 5  •  isosorbide mononitrate (IMDUR) 60 MG 24 hr tablet, Take 1 tablet by mouth Daily. (Patient taking differently: Take 60 mg by mouth Daily.), Disp: 30 tablet, Rfl: 11  •  levothyroxine (SYNTHROID, LEVOTHROID) 150 MCG tablet, Take 1 tablet by mouth Daily., Disp: 90 tablet, Rfl: 2  •  losartan (COZAAR) 50 MG tablet, Take 1 tablet by mouth Daily., Disp: 90 tablet, Rfl: 3  •  Multiple Vitamins-Minerals (CENTRUM SILVER PO), Take 1 tablet by mouth Daily., Disp: , Rfl:   •  sertraline (ZOLOFT) 100 MG tablet, Take 1 tablet by mouth Daily., Disp: 30 tablet, Rfl: 5  •  travoprost, PAUL free, (TRAVATAN) 0.004 % solution ophthalmic solution, Administer 1 drop to both eyes Every Evening. in affected eye(s) , Disp: , Rfl:     The following portions of the patient's history were reviewed and updated as appropriate: allergies, current medications, past family history, past medical history, past social history, past surgical history and problem list.    ROS  Review of Systems   Constitution: Negative for chills, fever, weight gain and weight loss.   Cardiovascular: Negative for chest pain, claudication, dyspnea on exertion, leg swelling, orthopnea, palpitations, paroxysmal nocturnal dyspnea and syncope.        No dizziness   Neuromuscular:   Gastrointestinal: Negative for abdominal pain, constipation, diarrhea, nausea and vomiting.   Genitourinary: Positive for myalgias.    "    No urinary symptoms   Neurological:        No symptoms of stroke.   All other systems reviewed and are negative.    Objective:     Blood pressure 162/86, pulse 63, height 175.3 cm (69\"), weight 79.7 kg (175 lb 12.8 oz), SpO2 96 %.  Repeat blood pressure measurement by, Candy Ribeiro MD, at 168/90.      Physical Exam  Constitutional: She appears well-developed and well-nourished.   HENT:   HEENT exam unremarkable.   Neck: Neck supple. No JVD present.   No carotid bruits.   Cardiovascular: Normal rate, regular rhythm and normal heart sounds.    No murmur heard.  2 plus symmetric pulses.   Pulmonary/Chest: Breath sounds normal. Does not exhibit tenderness.   Abdominal:   Abdomen benign.   Musculoskeletal: Does not exhibit edema.   Neurological:   Neurological exam unremarkable.   Vitals reviewed.    Lab Review:   Lab Results   Component Value Date    GLUCOSE 99 10/19/2018    BUN 21 10/19/2018    CREATININE 1.00 10/19/2018    EGFRIFNONA 74 10/19/2018    BCR 21.0 10/19/2018    K 5.1 10/19/2018    CO2 30.0 10/19/2018    CALCIUM 9.2 10/19/2018    ALBUMIN 4.21 10/19/2018    AST 32 10/19/2018    ALT 35 10/19/2018     Lab Results   Component Value Date    CHOL 219 (H) 10/19/2018    TRIG 108 10/19/2018    HDL 65 (H) 10/19/2018     (H) 10/19/2018     Lab Results   Component Value Date    WBC 4.86 10/19/2018    HGB 13.7 10/19/2018    HCT 40.6 10/19/2018    MCV 95.5 10/19/2018     10/19/2018     Lab Results   Component Value Date    TSH 87.518 (H) 10/19/2018     Lab Results   Component Value Date    HGBA1C 5.00 06/29/2018       Procedures       Assessment:      Diagnosis Plan   1. Coronary artery disease involving native coronary artery of native heart with unstable angina pectoris (CMS/HCC)  Stable, continue current medications.   2. Frequent unifocal PVCs  Stable s/p VT ablation.   3. Essential hypertension  Poor control. Increase amlodipine from 5 to 10 mg daily. Continue all other medications, and monitor BP " at home.   4. Familial hypercholesterolemia  DC pravastatin due to myalgias. Begin process for PCSK9 inhibitors.     Plan:   Increase amlodipine from 5 to 10 mg daily for better control of hypertension.  Stable cardiac status.  Continue all other current medications.   FU in 6 MO, sooner as needed.  Thank you for allowing us to participate in the care of your patient.     Scribed for Candy Ribeiro MD by Yamileth Casarez. 1/25/2019  11:21 AM      I, Candy Ribeiro MD, personally performed the services described in this documentation as scribed by the above named individual in my presence, and it is both accurate and complete.  1/25/2019  11:46 AM        Please note that portions of this note may have been completed with a voice recognition program. Efforts were made to edit the dictations, but occasionally words are mistranscribed.

## 2019-02-05 ENCOUNTER — TELEPHONE (OUTPATIENT)
Dept: CARDIAC SURGERY | Facility: CLINIC | Age: 69
End: 2019-02-05

## 2019-02-06 RX ORDER — ACYCLOVIR 400 MG/1
TABLET ORAL
Qty: 30 TABLET | Refills: 1 | Status: SHIPPED | OUTPATIENT
Start: 2019-02-06 | End: 2019-04-22 | Stop reason: SDUPTHER

## 2019-03-09 PROBLEM — S20.212A RIB CONTUSION, LEFT, INITIAL ENCOUNTER: Status: ACTIVE | Noted: 2019-03-09

## 2019-04-02 ENCOUNTER — PRIOR AUTHORIZATION (OUTPATIENT)
Dept: CARDIOLOGY | Facility: CLINIC | Age: 69
End: 2019-04-02

## 2019-04-22 RX ORDER — SERTRALINE HYDROCHLORIDE 100 MG/1
TABLET, FILM COATED ORAL
Qty: 30 TABLET | Refills: 1 | Status: SHIPPED | OUTPATIENT
Start: 2019-04-22 | End: 2019-05-31 | Stop reason: SDUPTHER

## 2019-04-22 RX ORDER — ACYCLOVIR 400 MG/1
TABLET ORAL
Qty: 30 TABLET | Refills: 0 | Status: SHIPPED | OUTPATIENT
Start: 2019-04-22 | End: 2019-04-28 | Stop reason: SDUPTHER

## 2019-04-28 RX ORDER — ACYCLOVIR 400 MG/1
TABLET ORAL
Qty: 30 TABLET | Refills: 0 | Status: SHIPPED | OUTPATIENT
Start: 2019-04-28 | End: 2019-05-04 | Stop reason: SDUPTHER

## 2019-05-01 RX ORDER — ACYCLOVIR 400 MG/1
TABLET ORAL
Qty: 30 TABLET | Refills: 0 | Status: CANCELLED | OUTPATIENT
Start: 2019-05-01

## 2019-05-04 RX ORDER — ACYCLOVIR 400 MG/1
400 TABLET ORAL 3 TIMES DAILY
Qty: 30 TABLET | Refills: 1 | Status: SHIPPED | OUTPATIENT
Start: 2019-05-04 | End: 2019-05-31 | Stop reason: SDUPTHER

## 2019-05-30 RX ORDER — ACYCLOVIR 400 MG/1
TABLET ORAL
Qty: 30 TABLET | Refills: 0 | OUTPATIENT
Start: 2019-05-30

## 2019-05-31 ENCOUNTER — OFFICE VISIT (OUTPATIENT)
Dept: FAMILY MEDICINE CLINIC | Facility: CLINIC | Age: 69
End: 2019-05-31

## 2019-05-31 VITALS
RESPIRATION RATE: 16 BRPM | HEART RATE: 71 BPM | OXYGEN SATURATION: 97 % | WEIGHT: 167.2 LBS | HEIGHT: 69 IN | SYSTOLIC BLOOD PRESSURE: 126 MMHG | BODY MASS INDEX: 24.76 KG/M2 | DIASTOLIC BLOOD PRESSURE: 72 MMHG

## 2019-05-31 DIAGNOSIS — E03.9 ACQUIRED HYPOTHYROIDISM: ICD-10-CM

## 2019-05-31 DIAGNOSIS — I10 ESSENTIAL HYPERTENSION: ICD-10-CM

## 2019-05-31 DIAGNOSIS — Z12.5 PROSTATE CANCER SCREENING: ICD-10-CM

## 2019-05-31 DIAGNOSIS — E78.01 FAMILIAL HYPERCHOLESTEROLEMIA: ICD-10-CM

## 2019-05-31 DIAGNOSIS — R00.1 BRADYCARDIA: Primary | ICD-10-CM

## 2019-05-31 DIAGNOSIS — I49.3 FREQUENT UNIFOCAL PVCS: ICD-10-CM

## 2019-05-31 PROCEDURE — 93000 ELECTROCARDIOGRAM COMPLETE: CPT | Performed by: FAMILY MEDICINE

## 2019-05-31 PROCEDURE — 90471 IMMUNIZATION ADMIN: CPT | Performed by: FAMILY MEDICINE

## 2019-05-31 PROCEDURE — 90632 HEPA VACCINE ADULT IM: CPT | Performed by: FAMILY MEDICINE

## 2019-05-31 PROCEDURE — 99214 OFFICE O/P EST MOD 30 MIN: CPT | Performed by: FAMILY MEDICINE

## 2019-05-31 RX ORDER — SERTRALINE HYDROCHLORIDE 100 MG/1
100 TABLET, FILM COATED ORAL DAILY
Qty: 90 TABLET | Refills: 1 | Status: SHIPPED | OUTPATIENT
Start: 2019-05-31 | End: 2019-12-23

## 2019-05-31 RX ORDER — ACYCLOVIR 400 MG/1
400 TABLET ORAL DAILY
Qty: 90 TABLET | Refills: 2 | Status: SHIPPED | OUTPATIENT
Start: 2019-05-31 | End: 2020-04-27 | Stop reason: SDUPTHER

## 2019-05-31 RX ORDER — LOSARTAN POTASSIUM 50 MG/1
50 TABLET ORAL DAILY
Qty: 90 TABLET | Refills: 3 | Status: SHIPPED | OUTPATIENT
Start: 2019-05-31 | End: 2020-04-27 | Stop reason: SDUPTHER

## 2019-05-31 RX ORDER — LEVOTHYROXINE SODIUM 0.15 MG/1
150 TABLET ORAL DAILY
Qty: 90 TABLET | Refills: 2 | Status: SHIPPED | OUTPATIENT
Start: 2019-05-31 | End: 2020-03-20

## 2019-05-31 NOTE — PROGRESS NOTES
Subjective   Enio Tapia is a 69 y.o. male seen today for Hyperlipidemia (7 month f/u  med RF); Hypertension (med RF); Hypothyroidism; and Mouth Lesions (med RF).     Enio appears to be doing well overall. One issue recently is he has started experiencing slow heart rate. Reports HRs as low as the 30s. Sees Cardiology regularly. He has previous history of bradycardia as well as frequent PVCs.       Hyperlipidemia   This is a chronic problem. The current episode started more than 1 year ago. The problem is controlled. Exacerbating diseases include hypothyroidism. Factors aggravating his hyperlipidemia include fatty foods. Pertinent negatives include no myalgias. Treatments tried: evolocumab injections  The current treatment provides significant improvement of lipids. There are no compliance problems.  Risk factors for coronary artery disease include dyslipidemia, hypertension and male sex.   Hypertension   This is a chronic problem. The current episode started more than 1 year ago. The problem is unchanged. The problem is controlled. Pertinent negatives include no headaches. Risk factors: known cad. Current antihypertension treatment includes calcium channel blockers, beta blockers, angiotensin blockers and lifestyle changes. The current treatment provides significant improvement. There are no compliance problems.  Hypertensive end-organ damage includes CAD/MI. There is no history of angina or kidney disease.   Hypothyroidism   This is a chronic problem. The problem occurs constantly. The problem has been unchanged. Pertinent negatives include no arthralgias, congestion, coughing, fatigue, headaches, joint swelling, myalgias, nausea, rash, sore throat, vomiting or weakness. Treatments tried: levothyroxine. The treatment provided significant relief.        The following portions of the patient's history were reviewed and updated as appropriate: allergies, current medications, past social history and problem  "list.    Review of Systems   Constitutional: Negative for activity change, fatigue and unexpected weight change.   HENT: Negative for congestion and sore throat.    Eyes: Negative for pain and visual disturbance.   Respiratory: Negative for cough.    Gastrointestinal: Negative for constipation, diarrhea, nausea and vomiting.   Endocrine: Negative for cold intolerance and heat intolerance.   Genitourinary: Negative for dysuria and hematuria.   Musculoskeletal: Negative for arthralgias, joint swelling and myalgias.   Skin: Negative for color change and rash.   Allergic/Immunologic: Negative for environmental allergies and food allergies.   Neurological: Negative for tremors, syncope, weakness and headaches.   Hematological: Negative for adenopathy. Does not bruise/bleed easily.   Psychiatric/Behavioral: Negative for agitation. The patient is not nervous/anxious.        Objective   /72   Pulse 71   Resp 16   Ht 175.3 cm (69\")   Wt 75.8 kg (167 lb 3.2 oz)   SpO2 97%   BMI 24.69 kg/m²   Physical Exam   Constitutional: He is oriented to person, place, and time. He appears well-developed and well-nourished.   HENT:   Head: Normocephalic and atraumatic.   Eyes: Conjunctivae are normal. Pupils are equal, round, and reactive to light. No scleral icterus.   Neck: Neck supple. No thyromegaly present.   Cardiovascular: Normal rate and regular rhythm.   Pulmonary/Chest: Effort normal and breath sounds normal.   Musculoskeletal: Normal range of motion.   Neurological: He is alert and oriented to person, place, and time.   Skin: Skin is warm and dry. No rash noted.   Psychiatric: He has a normal mood and affect.   Nursing note and vitals reviewed.      Assessment/Plan   Problem List Items Addressed This Visit        Active Problems    Hyperlipidemia    Relevant Orders    Comprehensive Metabolic Panel    Lipid Panel    CBC (No Diff)    Hypertension    Relevant Medications    losartan (COZAAR) 50 MG tablet    Other " Relevant Orders    Comprehensive Metabolic Panel    CBC (No Diff)    Hypothyroidism    Relevant Medications    levothyroxine (SYNTHROID, LEVOTHROID) 150 MCG tablet    Other Relevant Orders    TSH    Frequent unifocal PVCs    Bradycardia - Primary    Relevant Orders    ECG 12 Lead      Other Visit Diagnoses     Prostate cancer screening        Relevant Orders    PSA Screen          ECG 12 Lead  Date/Time: 5/31/2019 11:54 AM  Performed by: Troy Mckay MD  Authorized by: Troy Mckay MD   Comparison: compared with previous ECG   Similar to previous ECG  Rhythm: sinus rhythm  Ectopy: unifocal PVCs and bigeminy  Rate: bradycardic  Other findings: non-specific ST-T wave changes and poor R wave progression    Clinical impression: abnormal EKG          Follow-up with cardiology soon.  Return to clinic fasting for lab work next Friday.  Go to the emergency room if becomes symptomatic from low heart rate such as dizziness or presyncope.

## 2019-06-07 DIAGNOSIS — I49.3 FREQUENT PVCS: Primary | ICD-10-CM

## 2019-06-07 NOTE — PROGRESS NOTES
Dr. Parker spoke with pt regarding EKG sent from PCP.  Will order 24 hour holter and see pt in clinic on 6/18.

## 2019-06-14 ENCOUNTER — LAB (OUTPATIENT)
Dept: FAMILY MEDICINE CLINIC | Facility: CLINIC | Age: 69
End: 2019-06-14

## 2019-06-14 DIAGNOSIS — E78.01 FAMILIAL HYPERCHOLESTEROLEMIA: ICD-10-CM

## 2019-06-14 DIAGNOSIS — Z12.5 PROSTATE CANCER SCREENING: ICD-10-CM

## 2019-06-14 DIAGNOSIS — E03.9 ACQUIRED HYPOTHYROIDISM: ICD-10-CM

## 2019-06-14 DIAGNOSIS — I10 ESSENTIAL HYPERTENSION: ICD-10-CM

## 2019-06-14 LAB
ALBUMIN SERPL-MCNC: 4.2 G/DL (ref 3.5–5.2)
ALBUMIN/GLOB SERPL: 1.6 G/DL
ALP SERPL-CCNC: 66 U/L (ref 39–117)
ALT SERPL W P-5'-P-CCNC: 27 U/L (ref 1–41)
ANION GAP SERPL CALCULATED.3IONS-SCNC: 8.4 MMOL/L
AST SERPL-CCNC: 22 U/L (ref 1–40)
BILIRUB SERPL-MCNC: 0.4 MG/DL (ref 0.2–1.2)
BUN BLD-MCNC: 18 MG/DL (ref 8–23)
BUN/CREAT SERPL: 19.1 (ref 7–25)
CALCIUM SPEC-SCNC: 9.4 MG/DL (ref 8.6–10.5)
CHLORIDE SERPL-SCNC: 102 MMOL/L (ref 98–107)
CHOLEST SERPL-MCNC: 122 MG/DL (ref 0–200)
CO2 SERPL-SCNC: 26.6 MMOL/L (ref 22–29)
CREAT BLD-MCNC: 0.94 MG/DL (ref 0.76–1.27)
DEPRECATED RDW RBC AUTO: 46.8 FL (ref 37–54)
ERYTHROCYTE [DISTWIDTH] IN BLOOD BY AUTOMATED COUNT: 13.2 % (ref 12.3–15.4)
GFR SERPL CREATININE-BSD FRML MDRD: 79 ML/MIN/1.73
GLOBULIN UR ELPH-MCNC: 2.6 GM/DL
GLUCOSE BLD-MCNC: 101 MG/DL (ref 65–99)
HCT VFR BLD AUTO: 41.2 % (ref 37.5–51)
HDLC SERPL-MCNC: 59 MG/DL (ref 40–60)
HGB BLD-MCNC: 13.8 G/DL (ref 13–17.7)
LDLC SERPL CALC-MCNC: 49 MG/DL (ref 0–100)
LDLC/HDLC SERPL: 0.82 {RATIO}
MCH RBC QN AUTO: 32.2 PG (ref 26.6–33)
MCHC RBC AUTO-ENTMCNC: 33.5 G/DL (ref 31.5–35.7)
MCV RBC AUTO: 96.3 FL (ref 79–97)
PLATELET # BLD AUTO: 166 10*3/MM3 (ref 140–450)
PMV BLD AUTO: 9.7 FL (ref 6–12)
POTASSIUM BLD-SCNC: 5.2 MMOL/L (ref 3.5–5.2)
PROT SERPL-MCNC: 6.8 G/DL (ref 6–8.5)
PSA SERPL-MCNC: 1.18 NG/ML (ref 0–4)
RBC # BLD AUTO: 4.28 10*6/MM3 (ref 4.14–5.8)
SODIUM BLD-SCNC: 137 MMOL/L (ref 136–145)
TRIGL SERPL-MCNC: 72 MG/DL (ref 0–150)
TSH SERPL DL<=0.05 MIU/L-ACNC: 3.68 MIU/ML (ref 0.27–4.2)
VLDLC SERPL-MCNC: 14.4 MG/DL (ref 5–40)
WBC NRBC COR # BLD: 4.16 10*3/MM3 (ref 3.4–10.8)

## 2019-06-14 PROCEDURE — 80061 LIPID PANEL: CPT | Performed by: FAMILY MEDICINE

## 2019-06-14 PROCEDURE — 85027 COMPLETE CBC AUTOMATED: CPT | Performed by: FAMILY MEDICINE

## 2019-06-14 PROCEDURE — 84443 ASSAY THYROID STIM HORMONE: CPT | Performed by: FAMILY MEDICINE

## 2019-06-14 PROCEDURE — 80053 COMPREHEN METABOLIC PANEL: CPT | Performed by: FAMILY MEDICINE

## 2019-06-14 PROCEDURE — G0103 PSA SCREENING: HCPCS | Performed by: FAMILY MEDICINE

## 2019-06-14 PROCEDURE — 36415 COLL VENOUS BLD VENIPUNCTURE: CPT | Performed by: FAMILY MEDICINE

## 2019-06-18 ENCOUNTER — OFFICE VISIT (OUTPATIENT)
Dept: CARDIOLOGY | Facility: CLINIC | Age: 69
End: 2019-06-18

## 2019-06-18 VITALS
BODY MASS INDEX: 25.33 KG/M2 | HEART RATE: 65 BPM | WEIGHT: 171 LBS | SYSTOLIC BLOOD PRESSURE: 148 MMHG | HEIGHT: 69 IN | DIASTOLIC BLOOD PRESSURE: 68 MMHG

## 2019-06-18 DIAGNOSIS — I49.3 FREQUENT PVCS: Primary | ICD-10-CM

## 2019-06-18 DIAGNOSIS — R00.1 BRADYCARDIA: ICD-10-CM

## 2019-06-18 PROCEDURE — 99214 OFFICE O/P EST MOD 30 MIN: CPT | Performed by: INTERNAL MEDICINE

## 2019-06-18 NOTE — PROGRESS NOTES
Enio Tapia  1950  PCP: Troy Mckay MD    SUBJECTIVE:   Enio Tapia is a 69 y.o. male seen for a consultation visit regarding the following:     Chief Complaint:   Chief Complaint   Patient presents with   • Coronary Artery Disease   • PVC's          HPI:   Palps have returned. A monitor was placed that showed 11% burden. Symptoms are mild    History:  This is a 68-year-old patient followed by Dr. Ribeiro.  He has a history of extensive coronary artery disease.  He most recently has found to have frequent ventricular ectopy for which he has been having increased shortness of breath and fatigue as a result. He was bite by a horse on May 19th, 2018. There was severe chest trauma from the bite. He noted that palp/pvcs, after have the horse bite trauma.  He's had extensive chest trauma that is also adding to his issues.        Cardiac PMH: (Old records have been reviewed and summarized below)  1. Coronary artery disease  a. As/P3 0.5 x 32 Taxus JOHANA mid RCA lesion 10/15/04  b. Normal stress echo Feb 2010  c. Echo February 2010 showed normal EF of 60% with trace TR and MRAna mary. [facility] 3/31/17For unstable angina: Diffuse 70% stenosis of mid LAD noted.  Stented with Xience 3.0 x 33 JOHANA reducing stenosis to 0%.  Also 70% discrete stenosis in proximal first diagonal stented with Xience Alpine 2.25 x 15 JOHANA reducing stenosis to 0%.  Previous stenting proximal to mid RCA widely patent.  Normal LVEF.  e. Echo 3/31/17: EF 55%.  Mild LVH noted.  2. Hypertension  3. Hyperlipidemia  4. Hypothyroidism  5. Anxiety disorder  6. Glaucoma  7. Osteoarthritis  8. GRABIEL with CPAP compliance.  9. 24 Hr Holter - May 2018 - Min 61, Mean 70, Max 108 - 25,091 PVCs - Mostly Bigem  10. EP Study - Ectopy ablation - June 2018 - 1. Successful catheter mapping ablation of 2 ventricular foci. 2. Induction of ectopy was difficult.  Less than 10 ectopic beats were able to be induced over a 2 hour time period.  11. 48 hr monitor - June 2019 -  78229 PVCs, 11.8% burden      Past Medical History, Past Surgical History, Family history, Social History, and Medications were all reviewed with the patient today and updated as necessary.     Current Outpatient Medications   Medication Sig Dispense Refill   • acyclovir (ZOVIRAX) 400 MG tablet Take 1 tablet by mouth Daily. 90 tablet 2   • amLODIPine (NORVASC) 10 MG tablet Take 1 tablet by mouth Daily. 90 tablet 3   • aspirin 81 MG EC tablet Take 81 mg by mouth Daily.     • clopidogrel (PLAVIX) 75 MG tablet Take 1 tablet by mouth Daily. 90 tablet 3   • Evolocumab 140 MG/ML solution auto-injector Inject 1 mL under the skin into the appropriate area as directed Every 14 (Fourteen) Days. 2 pen 11   • levothyroxine (SYNTHROID, LEVOTHROID) 150 MCG tablet Take 1 tablet by mouth Daily. 90 tablet 2   • losartan (COZAAR) 50 MG tablet Take 1 tablet by mouth Daily. 90 tablet 3   • Multiple Vitamins-Minerals (CENTRUM SILVER PO) Take 1 tablet by mouth Daily.     • sertraline (ZOLOFT) 100 MG tablet Take 1 tablet by mouth Daily. 90 tablet 1   • travoprost, PAUL free, (TRAVATAN) 0.004 % solution ophthalmic solution Administer 1 drop to both eyes Every Evening. in affected eye(s)        No current facility-administered medications for this visit.      Allergies   Allergen Reactions   • Pravastatin Myalgia   • Statins Myalgia     ESPECIALLY LIPITOR         Past Medical History:   Diagnosis Date   • Arthritis    • BPH (benign prostatic hyperplasia)    • Chest wall hematoma    • Coronary artery disease involving native coronary artery of native heart with unstable angina pectoris (CMS/[facility]) 3/31/2017   • Depression    • Disease of thyroid gland    • Enlarged prostate    • Frequent PVCs    • Glaucoma    • Hematoma     right chest   • Resighini (hard of hearing)    • Hyperlipidemia    • Hypertension    • GRABIEL on CPAP      Past Surgical History:   Procedure Laterality Date   • ABLATION OF DYSRHYTHMIC FOCUS     • CARDIAC CATHETERIZATION     •  "CARDIAC CATHETERIZATION N/A 3/31/2017    Procedure: Left Heart Cath;  Surgeon: Enio Benavidez MD;  Location:  [facility] CATH [facility];  Service:    • CARDIAC ELECTROPHYSIOLOGY PROCEDURE N/A 7/9/2018    Procedure: Ablation PVC;  Surgeon: Brandon Parker MD;  Location:  [facility] EP INVASIVE LOCATION;  Service: Cardiovascular   • CATARACT EXTRACTION Bilateral    • COLONOSCOPY     • CORONARY STENT PLACEMENT  10/16/2004, 2017   • INCISION AND DRAINAGE HEMATOMA  06/08/2018    Chest Wall-right side    • INCISION AND DRAINAGE LEG Right 6/8/2018    Procedure: INCISION AND DRAINAGE RIGHT TRUNK HEMATOMA;  Surgeon: Gerardo Gutierrez MD;  Location:  [facility] OR;  Service: Cardiothoracic   • TONSILLECTOMY     • VASECTOMY  08/1998     Family History   Problem Relation Age of Onset   • Heart disease Mother    • Heart failure Mother    • Pneumonia Father      Social History     Tobacco Use   • Smoking status: Never Smoker   • Smokeless tobacco: Former User     Types: Chew, Snuff   Substance Use Topics   • Alcohol use: Yes     Alcohol/week: 7.2 oz     Types: 12 Cans of beer per week     Comment: occas            PHYSICAL EXAM:   /68 (BP Location: Right arm, Patient Position: Sitting)   Pulse 65   Ht 175.3 cm (69\")   Wt 77.6 kg (171 lb)   BMI 25.25 kg/m²      Wt Readings from Last 5 Encounters:   06/18/19 77.6 kg (171 lb)   05/31/19 75.8 kg (167 lb 3.2 oz)   03/09/19 77.1 kg (170 lb)   01/25/19 79.7 kg (175 lb 12.8 oz)   10/30/18 79.8 kg (176 lb)     BP Readings from Last 5 Encounters:   06/18/19 148/68   05/31/19 126/72   03/09/19 132/74   01/25/19 162/86   10/30/18 132/76       General-Well Nourished, Well developed  Eyes - PERRLA  Neck- supple, No mass  CV- regular rate and rhythm, no MRG  Lung- clear bilaterally  Abd- soft, +BS  Musc/skel - Norm strength and range of motion  Skin- warm and dry  Neuro - Alert & Oriented x 3, appropriate mood.    Medical problems and test results were reviewed with the patient today. "     Results for orders placed or performed in visit on 06/14/19   PSA Screen   Result Value Ref Range    PSA 1.180 0.000 - 4.000 ng/mL   CBC (No Diff)   Result Value Ref Range    WBC 4.16 3.40 - 10.80 10*3/mm3    RBC 4.28 4.14 - 5.80 10*6/mm3    Hemoglobin 13.8 13.0 - 17.7 g/dL    Hematocrit 41.2 37.5 - 51.0 %    MCV 96.3 79.0 - 97.0 fL    MCH 32.2 26.6 - 33.0 pg    MCHC 33.5 31.5 - 35.7 g/dL    RDW 13.2 12.3 - 15.4 %    RDW-SD 46.8 37.0 - 54.0 fl    MPV 9.7 6.0 - 12.0 fL    Platelets 166 140 - 450 10*3/mm3   TSH   Result Value Ref Range    TSH 3.680 0.270 - 4.200 mIU/mL   Lipid Panel   Result Value Ref Range    Total Cholesterol 122 0 - 200 mg/dL    Triglycerides 72 0 - 150 mg/dL    HDL Cholesterol 59 40 - 60 mg/dL    LDL Cholesterol  49 0 - 100 mg/dL    VLDL Cholesterol 14.4 5 - 40 mg/dL    LDL/HDL Ratio 0.82    Comprehensive Metabolic Panel   Result Value Ref Range    Glucose 101 (H) 65 - 99 mg/dL    BUN 18 8 - 23 mg/dL    Creatinine 0.94 0.76 - 1.27 mg/dL    Sodium 137 136 - 145 mmol/L    Potassium 5.2 3.5 - 5.2 mmol/L    Chloride 102 98 - 107 mmol/L    CO2 26.6 22.0 - 29.0 mmol/L    Calcium 9.4 8.6 - 10.5 mg/dL    Total Protein 6.8 6.0 - 8.5 g/dL    Albumin 4.20 3.50 - 5.20 g/dL    ALT (SGPT) 27 1 - 41 U/L    AST (SGOT) 22 1 - 40 U/L    Alkaline Phosphatase 66 39 - 117 U/L    Total Bilirubin 0.4 0.2 - 1.2 mg/dL    eGFR Non African Amer 79 >60 mL/min/1.73    Globulin 2.6 gm/dL    A/G Ratio 1.6 g/dL    BUN/Creatinine Ratio 19.1 7.0 - 25.0    Anion Gap 8.4 mmol/L         Lab Results   Component Value Date    CHOL 122 06/14/2019    HDL 59 06/14/2019    LDL 49 06/14/2019    VLDL 14.4 06/14/2019       EKG:  (EKG/Tracing has been independently visualized by me and summarized below)  Procedures    ASSESSMENT and PLAN  1.  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  We discussed with him therapy options, because of his extensive coronary artery  disease, bradycardia and lung disease antiarrhythmic options are limited.  He underwent catheter-based ablation.  The ectopy had greatly been suppressed with sedation therefore paced mapping was used for ablation. We stopped Amio. Some increase in ectopy but not as bad. Will monitor over the next 3 months. Consider repeat EP study with ablation vs Tikosyn loading  2.  Bradycardia- sinus node function has improved post ablation of the ventricular ectopy      Return in about 3 months (around 9/18/2019).          Brandon Parker M.D., F.A.C.C, F.H.R.S.  Cardiology/Electrophysiology  06/18/19  8:12 AM

## 2019-08-08 PROBLEM — R00.1 BRADYCARDIA: Status: RESOLVED | Noted: 2018-09-18 | Resolved: 2019-08-08

## 2019-08-08 NOTE — PROGRESS NOTES
Encounter Date:08/09/2019      Patient ID: Enio Tapia is a 69 y.o. male.    Troy Mckay MD    Chief Complaint: Coronary Artery Disease      PROBLEM LIST:  Patient Active Problem List    Diagnosis    • Frequent PVCs      Priority: High     Note Last Updated: 8/8/2019     1. 24 Hr Holter - May 2018 - Min 61, Mean 70, Max 108 - 25,091 PVCs - Mostly Bigem  2. EP Study - Ectopy ablation - June 2018 - 1. Successful catheter mapping ablation of 2 ventricular foci. 2. Induction of ectopy was difficult.  Less than 10 ectopic beats were able to be induced over a 2 hour time period.  3. 48 hr monitor - June 2019 - 25433 PVCs, 11.8% burden     • Coronary artery disease involving native coronary artery of native heart with unstable angina pectoris (CMS/[facility])      Priority: High     Note Last Updated: 8/8/2019     a. As/P3 0.5 x 32 Taxus JOHANA mid RCA lesion 10/15/04  b. C 3/31/17For unstable angina: Diffuse 70% stenosis of mid LAD noted.  Stented with Xience 3.0 x 33 JOHANA reducing stenosis to 0%.  Also 70% discrete stenosis in proximal first diagonal stented with Xience Alpine 2.25 x 15 JOHANA reducing stenosis to 0%.  Previous stenting proximal to mid RCA widely patent.  Normal LVEF.  c. Echo 3/31/17: EF 55%.  Mild LVH noted.     • Hyperlipidemia      Priority: Medium   • Hypertension      Priority: Medium   • Frequent unifocal PVCs      Priority: Low   • Sleep apnea      Priority: Low   • Rib contusion, left, initial encounter    • Hematoma of right chest wall    • Herpes simplex infection    • Hypothyroidism    • Irritable bowel syndrome    • Generalized anxiety disorder    • Inguinal hernia            History of Present Illness  Patient presents today for follow-up with a history of coronary disease, hypertension, dyslipidemia and frequent PVCs.  He returns today with no complaint of exertional chest pain or dyspnea, no orthopnea no PND no claudication no lower extremity edema.  He has very little awareness of  tachypalpitations, the ones he is aware of occur mostly while resting supine and are self-limiting.  He has no complaint of dizziness or syncope.  His blood pressures over the past 5 months have been overall acceptable on his current medical regimen.  He recently started Repatha and has showed remarkable results.  He states compliance with current medical regimen reports no significant side effects.    Allergies   Allergen Reactions   • Pravastatin Myalgia   • Statins Myalgia     ESPECIALLY LIPITOR         Current Outpatient Medications:   •  acyclovir (ZOVIRAX) 400 MG tablet, Take 1 tablet by mouth Daily., Disp: 90 tablet, Rfl: 2  •  amLODIPine (NORVASC) 10 MG tablet, Take 1 tablet by mouth Daily., Disp: 90 tablet, Rfl: 3  •  aspirin 81 MG EC tablet, Take 81 mg by mouth Daily., Disp: , Rfl:   •  clopidogrel (PLAVIX) 75 MG tablet, Take 1 tablet by mouth Daily., Disp: 90 tablet, Rfl: 3  •  Evolocumab 140 MG/ML solution auto-injector, Inject 1 mL under the skin into the appropriate area as directed Every 14 (Fourteen) Days., Disp: 2 pen, Rfl: 11  •  levothyroxine (SYNTHROID, LEVOTHROID) 150 MCG tablet, Take 1 tablet by mouth Daily., Disp: 90 tablet, Rfl: 2  •  losartan (COZAAR) 50 MG tablet, Take 1 tablet by mouth Daily., Disp: 90 tablet, Rfl: 3  •  Multiple Vitamins-Minerals (CENTRUM SILVER PO), Take 1 tablet by mouth Daily., Disp: , Rfl:   •  sertraline (ZOLOFT) 100 MG tablet, Take 1 tablet by mouth Daily., Disp: 90 tablet, Rfl: 1  •  travoprost, PAUL free, (TRAVATAN) 0.004 % solution ophthalmic solution, Administer 1 drop to both eyes Every Evening. in affected eye(s) , Disp: , Rfl:     The following portions of the patient's history were reviewed and updated as appropriate: allergies, current medications, past family history, past medical history, past social history, past surgical history and problem list.    Review of Systems   Constitution: Negative for diaphoresis, weakness, malaise/fatigue and weight gain.  "  Cardiovascular: Negative for chest pain, claudication, dyspnea on exertion, irregular heartbeat, leg swelling, orthopnea, palpitations, paroxysmal nocturnal dyspnea and syncope.   Respiratory: Negative for cough, shortness of breath, sleep disturbances due to breathing and wheezing.    Hematologic/Lymphatic: Negative for bleeding problem.   Musculoskeletal: Negative for muscle cramps, muscle weakness and myalgias.   Gastrointestinal: Negative for heartburn.     .    Objective:     /74 (BP Location: Left arm, Patient Position: Sitting)   Pulse 51   Ht 175.3 cm (69\")   Wt 77.1 kg (170 lb)   BMI 25.10 kg/m²    Body mass index is 25.1 kg/m².     Physical Exam   Constitutional: He is oriented to person, place, and time. He appears well-developed and well-nourished.   Cardiovascular: Normal rate, normal heart sounds and intact distal pulses. A regularly irregular rhythm present. Exam reveals no gallop and no friction rub.   No murmur heard.  Pulmonary/Chest: Effort normal and breath sounds normal. No respiratory distress. He has no wheezes. He has no rales. He exhibits no tenderness.   Bases clear   Musculoskeletal: Normal range of motion. He exhibits no edema.   Neurological: He is alert and oriented to person, place, and time.       Lab Review:                     Lab Results   Component Value Date    HGBA1C 5.00 06/29/2018     Lab Results   Component Value Date    TRIG 72 06/14/2019    TRIG 106 01/25/2019    TRIG 108 10/19/2018     Lab Results   Component Value Date    HDL 59 06/14/2019    HDL 66 (H) 01/25/2019    HDL 65 (H) 10/19/2018     Lab Results   Component Value Date    LDL 49 06/14/2019     (H) 01/25/2019     (H) 10/19/2018         Procedures             Assessment:      Diagnosis Plan   1. Coronary artery disease involving native coronary artery of native heart with unstable angina pectoris (CMS/HCC)   stable without current anginal symptoms, continue current medical regimen   2. " Mixed hyperlipidemia  Well managed,  continue current therapy.     3. Essential hypertension   acceptable on current medical regimen, continue to monitor   4. Frequent PVCs   self-limiting and followed by electrophysiology     Plan:     Stable cardiac status.  Continue current medications.   in 1 year, sooner as needed.  Thank you for allowing us to participate in the care of your patient.     Electronically signed by FRANCO Taylor, 08/09/19, 12:04 PM.      Please note that portions of this note may have been completed with a voice recognition program. Efforts were made to edit the dictations, but occasionally words are mistr

## 2019-08-09 ENCOUNTER — OFFICE VISIT (OUTPATIENT)
Dept: CARDIOLOGY | Facility: CLINIC | Age: 69
End: 2019-08-09

## 2019-08-09 VITALS
BODY MASS INDEX: 25.18 KG/M2 | WEIGHT: 170 LBS | HEART RATE: 51 BPM | HEIGHT: 69 IN | SYSTOLIC BLOOD PRESSURE: 136 MMHG | DIASTOLIC BLOOD PRESSURE: 74 MMHG

## 2019-08-09 DIAGNOSIS — I10 ESSENTIAL HYPERTENSION: ICD-10-CM

## 2019-08-09 DIAGNOSIS — E78.2 MIXED HYPERLIPIDEMIA: ICD-10-CM

## 2019-08-09 DIAGNOSIS — I25.110 CORONARY ARTERY DISEASE INVOLVING NATIVE CORONARY ARTERY OF NATIVE HEART WITH UNSTABLE ANGINA PECTORIS (HCC): Primary | ICD-10-CM

## 2019-08-09 DIAGNOSIS — I49.3 FREQUENT PVCS: ICD-10-CM

## 2019-08-09 PROCEDURE — 99214 OFFICE O/P EST MOD 30 MIN: CPT | Performed by: PHYSICIAN ASSISTANT

## 2019-10-01 ENCOUNTER — OFFICE VISIT (OUTPATIENT)
Dept: CARDIOLOGY | Facility: CLINIC | Age: 69
End: 2019-10-01

## 2019-10-01 VITALS
HEIGHT: 69 IN | DIASTOLIC BLOOD PRESSURE: 68 MMHG | WEIGHT: 167.6 LBS | SYSTOLIC BLOOD PRESSURE: 110 MMHG | BODY MASS INDEX: 24.82 KG/M2 | OXYGEN SATURATION: 94 % | HEART RATE: 86 BPM

## 2019-10-01 DIAGNOSIS — I49.3 FREQUENT PVCS: Primary | ICD-10-CM

## 2019-10-01 PROCEDURE — 99213 OFFICE O/P EST LOW 20 MIN: CPT | Performed by: INTERNAL MEDICINE

## 2019-10-01 NOTE — PROGRESS NOTES
Enio Tapia  1950  PCP: Troy Mckay MD    SUBJECTIVE:   Enio Tapia is a 69 y.o. male seen for a consultation visit regarding the following:     Chief Complaint:   Chief Complaint   Patient presents with   • Frequent PVCs          HPI:   Palps have been stable. Rare events. No CP or SOB    History:  This is a 69-year-old patient followed by Dr. Ribeiro.  He has a history of extensive coronary artery disease.  He most recently has found to have frequent ventricular ectopy for which he has been having increased shortness of breath and fatigue as a result. He was bite by a horse on May 19th, 2018. There was severe chest trauma from the bite. He noted that palp/pvcs, after have the horse bite trauma.  He's had extensive chest trauma that is also adding to his issues.        Cardiac PMH: (Old records have been reviewed and summarized below)  1. Coronary artery disease  a. As/P3 0.5 x 32 Taxus JOHANA mid RCA lesion 10/15/04  b. Normal stress echo Feb 2010  c. Echo February 2010 showed normal EF of 60% with trace TR and MR.  [facility]. [facility] 3/31/17For unstable angina: Diffuse 70% stenosis of mid LAD noted.  Stented with Xience 3.0 x 33 JOHANA reducing stenosis to 0%.  Also 70% discrete stenosis in proximal first diagonal stented with Xience Alpine 2.25 x 15 JOHANA reducing stenosis to 0%.  Previous stenting proximal to mid RCA widely patent.  Normal LVEF.  e. Echo 3/31/17: EF 55%.  Mild LVH noted.  2. Hypertension  3. Hyperlipidemia  4. Hypothyroidism  5. Anxiety disorder  6. Glaucoma  7. Osteoarthritis  8. GRABIEL with CPAP compliance.  9. 24 Hr Holter - May 2018 - Min 61, Mean 70, Max 108 - 25,091 PVCs - Mostly [place]  10. EP Study - Ectopy ablation - June 2018 - 1. Successful catheter mapping ablation of 2 ventricular foci. 2. Induction of ectopy was difficult.  Less than 10 ectopic beats were able to be induced over a 2 hour time period.  11. 48 hr monitor - June 2019 - 38354 PVCs, 11.8% burden      Past Medical History,  Past Surgical History, Family history, Social History, and Medications were all reviewed with the patient today and updated as necessary.     Current Outpatient Medications   Medication Sig Dispense Refill   • acyclovir (ZOVIRAX) 400 MG tablet Take 1 tablet by mouth Daily. 90 tablet 2   • amLODIPine (NORVASC) 10 MG tablet Take 1 tablet by mouth Daily. 90 tablet 3   • aspirin 81 MG EC tablet Take 81 mg by mouth Daily.     • clopidogrel (PLAVIX) 75 MG tablet Take 1 tablet by mouth Daily. 90 tablet 3   • Evolocumab 140 MG/ML solution auto-injector Inject 1 mL under the skin into the appropriate area as directed Every 14 (Fourteen) Days. 2 pen 11   • levothyroxine (SYNTHROID, LEVOTHROID) 150 MCG tablet Take 1 tablet by mouth Daily. 90 tablet 2   • losartan (COZAAR) 50 MG tablet Take 1 tablet by mouth Daily. 90 tablet 3   • Multiple Vitamins-Minerals (CENTRUM SILVER PO) Take 1 tablet by mouth Daily.     • sertraline (ZOLOFT) 100 MG tablet Take 1 tablet by mouth Daily. 90 tablet 1   • travoprost, PAUL free, (TRAVATAN) 0.004 % solution ophthalmic solution Administer 1 drop to both eyes Every Evening. in affected eye(s)        No current facility-administered medications for this visit.      Allergies   Allergen Reactions   • Pravastatin Myalgia   • Statins Myalgia     ESPECIALLY LIPITOR         Past Medical History:   Diagnosis Date   • Arthritis    • BPH (benign prostatic hyperplasia)    • Chest wall hematoma    • Coronary artery disease involving native coronary artery of native heart with unstable angina pectoris (CMS/HCC) 3/31/2017   • Depression    • Disease of thyroid gland    • Enlarged prostate    • Frequent PVCs    • Glaucoma    • Hematoma     right chest   • [place] (hard of hearing)    • Hyperlipidemia    • Hypertension    • GRABIEL on CPAP      Past Surgical History:   Procedure Laterality Date   • ABLATION OF DYSRHYTHMIC FOCUS     • CARDIAC CATHETERIZATION     • CARDIAC CATHETERIZATION N/A 3/31/2017    Procedure: Left  "Heart Cath;  Surgeon: Enio Benavidez MD;  Location:  [facility];  Service:    • CARDIAC ELECTROPHYSIOLOGY PROCEDURE N/A 7/9/2018    Procedure: Ablation PVC;  Surgeon: Brnadon Parker MD;  Location:  [facility] EP INVASIVE LOCATION;  Service: Cardiovascular   • CATARACT EXTRACTION Bilateral    • COLONOSCOPY     • CORONARY STENT PLACEMENT  10/16/2004, 2017   • INCISION AND DRAINAGE HEMATOMA  06/08/2018    Chest Wall-right side    • INCISION AND DRAINAGE LEG Right 6/8/2018    Procedure: INCISION AND DRAINAGE RIGHT TRUNK HEMATOMA;  Surgeon: Gerardo Gutierrez MD;  Location:  [facility] OR;  Service: Cardiothoracic   • TONSILLECTOMY     • VASECTOMY  08/1998     Family History   Problem Relation Age of Onset   • Heart disease Mother    • Heart failure Mother    • Pneumonia Father      Social History     Tobacco Use   • Smoking status: Never Smoker   • Smokeless tobacco: Former User     Types: Chew, Snuff   Substance Use Topics   • Alcohol use: Yes     Alcohol/week: 0.6 oz     Types: 1 Cans of beer per week     Comment: occas            PHYSICAL EXAM:   /68 (BP Location: Left arm, Patient Position: Sitting)   Pulse 86   Ht 175.3 cm (69\")   Wt 76 kg (167 lb 9.6 oz)   SpO2 94%   BMI 24.75 kg/m²      Wt Readings from Last 5 Encounters:   10/01/19 76 kg (167 lb 9.6 oz)   08/09/19 77.1 kg (170 lb)   06/18/19 77.6 kg (171 lb)   05/31/19 75.8 kg (167 lb 3.2 oz)   03/09/19 77.1 kg (170 lb)     BP Readings from Last 5 Encounters:   10/01/19 110/68   08/09/19 136/74   06/18/19 148/68   05/31/19 126/72   03/09/19 132/74       General-Well Nourished, Well developed  Eyes - PERRLA  Neck- supple, No mass  CV- regular rate and rhythm, no MRG  Lung- clear bilaterally  Abd- soft, +BS  Musc/skel - Norm strength and range of motion  Skin- warm and dry  Neuro - Alert & Oriented x 3, appropriate mood.    Medical problems and test results were reviewed with the patient today.     Results for orders placed or performed in visit " on 06/14/19   PSA Screen   Result Value Ref Range    PSA 1.180 0.000 - 4.000 ng/mL   CBC (No Diff)   Result Value Ref Range    WBC 4.16 3.40 - 10.80 10*3/mm3    RBC 4.28 4.14 - 5.80 10*6/mm3    Hemoglobin 13.8 13.0 - 17.7 g/dL    Hematocrit 41.2 37.5 - 51.0 %    MCV 96.3 79.0 - 97.0 fL    MCH 32.2 26.6 - 33.0 pg    MCHC 33.5 31.5 - 35.7 g/dL    RDW 13.2 12.3 - 15.4 %    RDW-SD 46.8 37.0 - 54.0 fl    MPV 9.7 6.0 - 12.0 fL    Platelets 166 140 - 450 10*3/mm3   TSH   Result Value Ref Range    TSH 3.680 0.270 - 4.200 mIU/mL   Lipid Panel   Result Value Ref Range    Total Cholesterol 122 0 - 200 mg/dL    Triglycerides 72 0 - 150 mg/dL    HDL Cholesterol 59 40 - 60 mg/dL    LDL Cholesterol  49 0 - 100 mg/dL    VLDL Cholesterol 14.4 5 - 40 mg/dL    LDL/HDL Ratio 0.82    Comprehensive Metabolic Panel   Result Value Ref Range    Glucose 101 (H) 65 - 99 mg/dL    BUN 18 8 - 23 mg/dL    Creatinine 0.94 0.76 - 1.27 mg/dL    Sodium 137 136 - 145 mmol/L    Potassium 5.2 3.5 - 5.2 mmol/L    Chloride 102 98 - 107 mmol/L    CO2 26.6 22.0 - 29.0 mmol/L    Calcium 9.4 8.6 - 10.5 mg/dL    Total Protein 6.8 6.0 - 8.5 g/dL    Albumin 4.20 3.50 - 5.20 g/dL    ALT (SGPT) 27 1 - 41 U/L    AST (SGOT) 22 1 - 40 U/L    Alkaline Phosphatase 66 39 - 117 U/L    Total Bilirubin 0.4 0.2 - 1.2 mg/dL    eGFR Non African Amer 79 >60 mL/min/1.73    Globulin 2.6 gm/dL    A/G Ratio 1.6 g/dL    BUN/Creatinine Ratio 19.1 7.0 - 25.0    Anion Gap 8.4 mmol/L         Lab Results   Component Value Date    CHOL 122 06/14/2019    HDL 59 06/14/2019    LDL 49 06/14/2019    VLDL 14.4 06/14/2019       EKG:  (EKG/Tracing has been independently visualized by me and summarized below)  Procedures    ASSESSMENT and PLAN  1.  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  We discussed with him therapy options, because of his extensive coronary artery disease, bradycardia and lung disease antiarrhythmic  options are limited.  He underwent catheter-based ablation.  The ectopy had greatly been suppressed with sedation therefore paced mapping was used for ablation. We stopped Amio. If it ectopy returns. Consider repeat EP study with ablation vs Tikosyn loading  2.  Bradycardia- sinus node function has improved post ablation of the ventricular ectopy      Return in about 6 months (around 4/1/2020).          Brandon Parker M.D., F.CARLOS ALBERTO.C.C, F.H.R.S.  Cardiology/Electrophysiology  10/01/19  3:49 PM

## 2019-11-22 ENCOUNTER — OFFICE VISIT (OUTPATIENT)
Dept: FAMILY MEDICINE CLINIC | Facility: CLINIC | Age: 69
End: 2019-11-22

## 2019-11-22 VITALS
WEIGHT: 171 LBS | OXYGEN SATURATION: 9 % | SYSTOLIC BLOOD PRESSURE: 142 MMHG | RESPIRATION RATE: 16 BRPM | HEART RATE: 66 BPM | HEIGHT: 69 IN | BODY MASS INDEX: 25.33 KG/M2 | DIASTOLIC BLOOD PRESSURE: 84 MMHG

## 2019-11-22 DIAGNOSIS — E78.01 FAMILIAL HYPERCHOLESTEROLEMIA: ICD-10-CM

## 2019-11-22 DIAGNOSIS — I10 ESSENTIAL HYPERTENSION: Primary | ICD-10-CM

## 2019-11-22 LAB
ALBUMIN SERPL-MCNC: 4.3 G/DL (ref 3.5–5.2)
ALBUMIN/GLOB SERPL: 1.5 G/DL
ALP SERPL-CCNC: 54 U/L (ref 39–117)
ALT SERPL W P-5'-P-CCNC: 30 U/L (ref 1–41)
ANION GAP SERPL CALCULATED.3IONS-SCNC: 10.6 MMOL/L (ref 5–15)
AST SERPL-CCNC: 26 U/L (ref 1–40)
BILIRUB SERPL-MCNC: 0.4 MG/DL (ref 0.2–1.2)
BUN BLD-MCNC: 19 MG/DL (ref 8–23)
BUN/CREAT SERPL: 22.1 (ref 7–25)
CALCIUM SPEC-SCNC: 9.4 MG/DL (ref 8.6–10.5)
CHLORIDE SERPL-SCNC: 105 MMOL/L (ref 98–107)
CHOLEST SERPL-MCNC: 171 MG/DL (ref 0–200)
CO2 SERPL-SCNC: 26.4 MMOL/L (ref 22–29)
CREAT BLD-MCNC: 0.86 MG/DL (ref 0.76–1.27)
GFR SERPL CREATININE-BSD FRML MDRD: 88 ML/MIN/1.73
GLOBULIN UR ELPH-MCNC: 2.9 GM/DL
GLUCOSE BLD-MCNC: 101 MG/DL (ref 65–99)
HCV AB SER DONR QL: NORMAL
HDLC SERPL-MCNC: 64 MG/DL (ref 40–60)
LDLC SERPL CALC-MCNC: 87 MG/DL (ref 0–100)
LDLC/HDLC SERPL: 1.36 {RATIO}
POTASSIUM BLD-SCNC: 5.5 MMOL/L (ref 3.5–5.2)
PROT SERPL-MCNC: 7.2 G/DL (ref 6–8.5)
SODIUM BLD-SCNC: 142 MMOL/L (ref 136–145)
TRIGL SERPL-MCNC: 99 MG/DL (ref 0–150)
VLDLC SERPL-MCNC: 19.8 MG/DL (ref 5–40)

## 2019-11-22 PROCEDURE — 99213 OFFICE O/P EST LOW 20 MIN: CPT | Performed by: FAMILY MEDICINE

## 2019-11-22 PROCEDURE — 80061 LIPID PANEL: CPT | Performed by: FAMILY MEDICINE

## 2019-11-22 PROCEDURE — 86803 HEPATITIS C AB TEST: CPT | Performed by: FAMILY MEDICINE

## 2019-11-22 PROCEDURE — 80053 COMPREHEN METABOLIC PANEL: CPT | Performed by: FAMILY MEDICINE

## 2019-11-22 NOTE — PROGRESS NOTES
Subjective   Enio Tapia is a 69 y.o. male.     Enio appears to be doing well overall.  He plans on retiring next month      Hypertension   This is a chronic problem. The current episode started more than 1 year ago. The problem is unchanged. The problem is controlled. Associated symptoms include palpitations. Pertinent negatives include no chest pain, headaches or shortness of breath. Risk factors: known cad. Current antihypertension treatment includes calcium channel blockers, beta blockers, angiotensin blockers and lifestyle changes. The current treatment provides significant improvement. There are no compliance problems.  Hypertensive end-organ damage includes CAD/MI. There is no history of angina or kidney disease.   Hyperlipidemia   This is a chronic problem. The current episode started more than 1 year ago. The problem is controlled. Exacerbating diseases include hypothyroidism. Factors aggravating his hyperlipidemia include fatty foods. Pertinent negatives include no chest pain, myalgias or shortness of breath. Treatments tried: evolocumab injections  The current treatment provides significant improvement of lipids. There are no compliance problems.  Risk factors for coronary artery disease include dyslipidemia, hypertension and male sex.   Hypothyroidism   This is a chronic problem. The problem occurs constantly. The problem has been unchanged. Pertinent negatives include no arthralgias, chest pain, congestion, fatigue, headaches, joint swelling, myalgias, nausea, sore throat, vomiting or weakness. Treatments tried: levothyroxine. The treatment provided significant relief.        The following portions of the patient's history were reviewed and updated as appropriate: allergies, current medications, past social history and problem list.    Review of Systems   Constitutional: Negative for activity change and fatigue.   HENT: Negative for congestion and sore throat.    Eyes: Negative for pain and  "visual disturbance.   Respiratory: Negative for chest tightness and shortness of breath.    Cardiovascular: Positive for palpitations. Negative for chest pain and leg swelling.   Gastrointestinal: Negative for diarrhea, nausea and vomiting.   Genitourinary: Negative for dysuria and hematuria.   Musculoskeletal: Negative for arthralgias, joint swelling and myalgias.   Neurological: Negative for dizziness, syncope, weakness and headaches.       Objective   /84   Pulse 66   Resp 16   Ht 175.3 cm (69\")   Wt 77.6 kg (171 lb)   SpO2 (!) 9%   BMI 25.25 kg/m²   Physical Exam   Constitutional: He is oriented to person, place, and time. He appears well-developed and well-nourished. He is cooperative.   HENT:   Head: Normocephalic.   Right Ear: External ear normal.   Left Ear: External ear normal.   Nose: Nose normal.   Mouth/Throat: Oropharynx is clear and moist.   Eyes: Conjunctivae are normal. Pupils are equal, round, and reactive to light. No scleral icterus.   Neck: Neck supple. Carotid bruit is not present. No thyromegaly present.   Cardiovascular: Normal rate, regular rhythm and normal heart sounds.   No irregularity or extrasystoles noted by auscultation   Pulmonary/Chest: Effort normal and breath sounds normal.   Abdominal: There is no hepatosplenomegaly.   Musculoskeletal: Normal range of motion. He exhibits no edema.   Neurological: He is alert and oriented to person, place, and time.   No focal deficits no lateralizing signs   Skin: Skin is warm and dry. No rash noted.   Psychiatric: He has a normal mood and affect. Cognition and memory are normal.   Nursing note and vitals reviewed.      Assessment/Plan   Problem List Items Addressed This Visit        Active Problems    Hyperlipidemia    Relevant Orders    Lipid Panel    Hypertension - Primary    Relevant Orders    Comprehensive Metabolic Panel    Lipid Panel    Hepatitis C Antibody          No orders of the defined types were placed in this " encounter.

## 2019-12-23 RX ORDER — SERTRALINE HYDROCHLORIDE 100 MG/1
TABLET, FILM COATED ORAL
Qty: 30 TABLET | Refills: 5 | Status: SHIPPED | OUTPATIENT
Start: 2019-12-23 | End: 2020-06-17

## 2020-02-18 RX ORDER — CLOPIDOGREL BISULFATE 75 MG/1
TABLET ORAL
Qty: 30 TABLET | Refills: 2 | Status: SHIPPED | OUTPATIENT
Start: 2020-02-18 | End: 2020-04-27 | Stop reason: SDUPTHER

## 2020-03-12 ENCOUNTER — PRIOR AUTHORIZATION (OUTPATIENT)
Dept: CARDIOLOGY | Facility: CLINIC | Age: 70
End: 2020-03-12

## 2020-03-20 RX ORDER — LEVOTHYROXINE SODIUM 0.15 MG/1
TABLET ORAL
Qty: 30 TABLET | Refills: 1 | Status: SHIPPED | OUTPATIENT
Start: 2020-03-20 | End: 2020-04-27 | Stop reason: SDUPTHER

## 2020-04-27 ENCOUNTER — OFFICE VISIT (OUTPATIENT)
Dept: FAMILY MEDICINE CLINIC | Facility: CLINIC | Age: 70
End: 2020-04-27

## 2020-04-27 VITALS
WEIGHT: 170.6 LBS | DIASTOLIC BLOOD PRESSURE: 76 MMHG | SYSTOLIC BLOOD PRESSURE: 142 MMHG | BODY MASS INDEX: 25.27 KG/M2 | TEMPERATURE: 97.3 F | RESPIRATION RATE: 16 BRPM | OXYGEN SATURATION: 94 % | HEART RATE: 76 BPM | HEIGHT: 69 IN

## 2020-04-27 DIAGNOSIS — E78.01 FAMILIAL HYPERCHOLESTEROLEMIA: ICD-10-CM

## 2020-04-27 DIAGNOSIS — I10 ESSENTIAL HYPERTENSION: Primary | ICD-10-CM

## 2020-04-27 DIAGNOSIS — E03.9 ACQUIRED HYPOTHYROIDISM: ICD-10-CM

## 2020-04-27 PROCEDURE — 99214 OFFICE O/P EST MOD 30 MIN: CPT | Performed by: FAMILY MEDICINE

## 2020-04-27 RX ORDER — ACYCLOVIR 400 MG/1
400 TABLET ORAL DAILY
Qty: 90 TABLET | Refills: 2 | Status: SHIPPED | OUTPATIENT
Start: 2020-04-27 | End: 2021-02-02 | Stop reason: SDUPTHER

## 2020-04-27 RX ORDER — LEVOTHYROXINE SODIUM 0.15 MG/1
150 TABLET ORAL DAILY
Qty: 90 TABLET | Refills: 1 | Status: SHIPPED | OUTPATIENT
Start: 2020-04-27 | End: 2020-11-11

## 2020-04-27 RX ORDER — CLOPIDOGREL BISULFATE 75 MG/1
75 TABLET ORAL DAILY
Qty: 90 TABLET | Refills: 1 | Status: SHIPPED | OUTPATIENT
Start: 2020-04-27 | End: 2020-11-23

## 2020-04-27 RX ORDER — LOSARTAN POTASSIUM 50 MG/1
50 TABLET ORAL DAILY
Qty: 90 TABLET | Refills: 3 | Status: SHIPPED | OUTPATIENT
Start: 2020-04-27 | End: 2021-02-02 | Stop reason: SDUPTHER

## 2020-04-27 NOTE — PROGRESS NOTES
Subjective   Enio Tapia is a 69 y.o. male.     Enio appears to be doing well overall.  Recent ablation was successful he is doing very well has a follow-up with cardiology in July.  He is retired and is enjoying fishing.    Hypertension   This is a chronic problem. The current episode started more than 1 year ago. The problem is unchanged. The problem is controlled. Pertinent negatives include no chest pain, headaches, palpitations or shortness of breath. Risk factors: known cad. Current antihypertension treatment includes calcium channel blockers, beta blockers, angiotensin blockers and lifestyle changes. The current treatment provides significant improvement. There are no compliance problems.  Hypertensive end-organ damage includes CAD/MI. There is no history of angina or kidney disease.   Hyperlipidemia   This is a chronic problem. The current episode started more than 1 year ago. The problem is controlled. Exacerbating diseases include hypothyroidism. Factors aggravating his hyperlipidemia include fatty foods. Pertinent negatives include no chest pain, myalgias or shortness of breath. Treatments tried: evolocumab injections  The current treatment provides significant improvement of lipids. There are no compliance problems.  Risk factors for coronary artery disease include dyslipidemia, hypertension and male sex.   Hypothyroidism   This is a chronic problem. The problem occurs constantly. The problem has been unchanged. Pertinent negatives include no arthralgias, chest pain, congestion, fatigue, headaches, joint swelling, myalgias, nausea, sore throat, vomiting or weakness. Treatments tried: levothyroxine. The treatment provided significant relief.        The following portions of the patient's history were reviewed and updated as appropriate: allergies, current medications, past social history and problem list.    Review of Systems   Constitutional: Negative for fatigue.   HENT: Negative for congestion  "and sore throat.    Respiratory: Negative for shortness of breath.    Cardiovascular: Negative for chest pain and palpitations.   Gastrointestinal: Negative for nausea and vomiting.   Musculoskeletal: Negative for arthralgias, joint swelling and myalgias.   Neurological: Negative for weakness and headaches.       Objective   /76   Pulse 76   Temp 97.3 °F (36.3 °C) (Temporal)   Resp 16   Ht 175.3 cm (69.02\")   Wt 77.4 kg (170 lb 9.6 oz)   SpO2 94%   BMI 25.18 kg/m²   Physical Exam   Constitutional: He is oriented to person, place, and time. He appears well-developed and well-nourished. He is cooperative.   HENT:   Head: Normocephalic and atraumatic.   Eyes: Pupils are equal, round, and reactive to light. Conjunctivae are normal. No scleral icterus.   Neck: Neck supple. Carotid bruit is not present. No thyromegaly present.   Cardiovascular: Normal rate, regular rhythm, normal heart sounds and intact distal pulses.   Pulmonary/Chest: Effort normal and breath sounds normal.   Abdominal: There is no hepatosplenomegaly.   Musculoskeletal: Normal range of motion. He exhibits no edema.   Neurological: He is alert and oriented to person, place, and time.   No focal deficits no lateralizing signs   Skin: Skin is warm and dry. No rash noted.   Psychiatric: He has a normal mood and affect. Cognition and memory are normal.   Nursing note and vitals reviewed.      Assessment/Plan   Problem List Items Addressed This Visit        Active Problems    Hyperlipidemia    Hypertension - Primary    Relevant Medications    losartan (COZAAR) 50 MG tablet    Acquired hypothyroidism    Relevant Medications    levothyroxine (SYNTHROID, LEVOTHROID) 150 MCG tablet          New Medications Ordered This Visit   Medications   • clopidogrel (PLAVIX) 75 MG tablet     Sig: Take 1 tablet by mouth Daily.     Dispense:  90 tablet     Refill:  1   • levothyroxine (SYNTHROID, LEVOTHROID) 150 MCG tablet     Sig: Take 1 tablet by mouth Daily.    "  Dispense:  90 tablet     Refill:  1   • losartan (COZAAR) 50 MG tablet     Sig: Take 1 tablet by mouth Daily.     Dispense:  90 tablet     Refill:  3   • acyclovir (ZOVIRAX) 400 MG tablet     Sig: Take 1 tablet by mouth Daily.     Dispense:  90 tablet     Refill:  2     Restart Praluent injections and follow-up in 3 months fasting.

## 2020-05-20 ENCOUNTER — OFFICE VISIT (OUTPATIENT)
Dept: CARDIOLOGY | Facility: CLINIC | Age: 70
End: 2020-05-20

## 2020-05-20 VITALS
WEIGHT: 172.8 LBS | HEART RATE: 64 BPM | OXYGEN SATURATION: 95 % | DIASTOLIC BLOOD PRESSURE: 72 MMHG | BODY MASS INDEX: 25.59 KG/M2 | TEMPERATURE: 96.1 F | SYSTOLIC BLOOD PRESSURE: 110 MMHG | HEIGHT: 69 IN

## 2020-05-20 DIAGNOSIS — I49.3 FREQUENT PVCS: Primary | ICD-10-CM

## 2020-05-20 DIAGNOSIS — I20.8 STABLE ANGINA PECTORIS (HCC): ICD-10-CM

## 2020-05-20 DIAGNOSIS — I25.110 CORONARY ARTERY DISEASE INVOLVING NATIVE CORONARY ARTERY OF NATIVE HEART WITH UNSTABLE ANGINA PECTORIS (HCC): ICD-10-CM

## 2020-05-20 PROBLEM — I20.89 STABLE ANGINA PECTORIS: Status: ACTIVE | Noted: 2020-05-20

## 2020-05-20 PROCEDURE — 99214 OFFICE O/P EST MOD 30 MIN: CPT | Performed by: INTERNAL MEDICINE

## 2020-05-20 RX ORDER — NITROGLYCERIN 0.4 MG/1
TABLET SUBLINGUAL
Qty: 100 TABLET | Refills: 11 | Status: SHIPPED | OUTPATIENT
Start: 2020-05-20

## 2020-05-20 NOTE — PROGRESS NOTES
Enio Tapia  1950  PCP: Troy Mckay MD    SUBJECTIVE:   Enio Tapia is a 70 y.o. male seen for a consultation visit regarding the following:     Chief Complaint:   Chief Complaint   Patient presents with   • PVC's   • Coronary Artery Disease          HPI:   Palps have been stable. Rare events. Rare mild CP on his left side. Pain at worse in 1-2/10    History:  This is a 70-year-old patient followed by Dr. Ribeiro.  He has a history of extensive coronary artery disease.  He most recently has found to have frequent ventricular ectopy for which he has been having increased shortness of breath and fatigue as a result. He was bite by a horse on May 19th, 2018. There was severe chest trauma from the bite. He noted that palp/pvcs, after have the horse bite trauma.  He's had extensive chest trauma that is also adding to his issues.        Cardiac PMH: (Old records have been reviewed and summarized below)  1. Coronary artery disease  a. As/P3 0.5 x 32 Taxus JOHANA mid RCA lesion 10/15/04  b. Normal stress echo Feb 2010  c. Echo February 2010 showed normal EF of 60% with trace TR and MRAna mary. C 3/31/17For unstable angina: Diffuse 70% stenosis of mid LAD noted.  Stented with Xience 3.0 x 33 JOHANA reducing stenosis to 0%.  Also 70% discrete stenosis in proximal first diagonal stented with Xience Alpine 2.25 x 15 JOHANA reducing stenosis to 0%.  Previous stenting proximal to mid RCA widely patent.  Normal LVEF.  e. Echo 3/31/17: EF 55%.  Mild LVH noted.  2. Hypertension  3. Hyperlipidemia  4. Hypothyroidism  5. Anxiety disorder  6. Glaucoma  7. Osteoarthritis  8. GRABIEL with CPAP compliance.  9. 24 Hr Holter - May 2018 - Min 61, Mean 70, Max 108 - 25,091 PVCs - Mostly Bigem  10. EP Study - Ectopy ablation - June 2018 - 1. Successful catheter mapping ablation of 2 ventricular foci. 2. Induction of ectopy was difficult.  Less than 10 ectopic beats were able to be induced over a 2 hour time period.  11. 48 hr monitor -  June 2019 - 22789 PVCs, 11.8% burden      Past Medical History, Past Surgical History, Family history, Social History, and Medications were all reviewed with the patient today and updated as necessary.     Current Outpatient Medications   Medication Sig Dispense Refill   • acyclovir (ZOVIRAX) 400 MG tablet Take 1 tablet by mouth Daily. 90 tablet 2   • aspirin 81 MG EC tablet Take 81 mg by mouth Daily.     • clopidogrel (PLAVIX) 75 MG tablet Take 1 tablet by mouth Daily. 90 tablet 1   • Evolocumab 140 MG/ML solution auto-injector Inject 1 mL under the skin into the appropriate area as directed Every 14 (Fourteen) Days. 2 pen 11   • levothyroxine (SYNTHROID, LEVOTHROID) 150 MCG tablet Take 1 tablet by mouth Daily. 90 tablet 1   • losartan (COZAAR) 50 MG tablet Take 1 tablet by mouth Daily. 90 tablet 3   • Multiple Vitamins-Minerals (CENTRUM SILVER PO) Take 1 tablet by mouth Daily.     • sertraline (ZOLOFT) 100 MG tablet TAKE ONE TABLET BY MOUTH DAILY 30 tablet 5   • travoprost, PAUL free, (TRAVATAN) 0.004 % solution ophthalmic solution Administer 1 drop to both eyes Every Evening. in affected eye(s)        No current facility-administered medications for this visit.      Allergies   Allergen Reactions   • Pravastatin Myalgia   • Statins Myalgia     ESPECIALLY LIPITOR         Past Medical History:   Diagnosis Date   • Arthritis    • BPH (benign prostatic hyperplasia)    • Chest wall hematoma    • Coronary artery disease involving native coronary artery of native heart with unstable angina pectoris (CMS/[facility]) 3/31/2017   • Depression    • Disease of thyroid gland    • Enlarged prostate    • Frequent PVCs    • Glaucoma    • Hematoma     right chest   • [place] (hard of hearing)    • Hyperlipidemia    • Hypertension    • GRABIEL on CPAP      Past Surgical History:   Procedure Laterality Date   • ABLATION OF DYSRHYTHMIC FOCUS     • CARDIAC CATHETERIZATION     • CARDIAC CATHETERIZATION N/A 3/31/2017    Procedure: Left Heart Cath;   "Surgeon: Enio Benavidez MD;  Location:  [facility] CATH [facility];  Service:    • CARDIAC ELECTROPHYSIOLOGY PROCEDURE N/A 7/9/2018    Procedure: Ablation PVC;  Surgeon: Brandon Parker MD;  Location:  [facility] EP INVASIVE LOC[facility];  Service: Cardiovascular   • CATARACT EXTRACTION Bilateral    • COLONOSCOPY     • CORONARY STENT PLACEMENT  10/16/2004, 2017   • INCISION AND DRAINAGE HEMATOMA  06/08/2018    Chest Wall-right side    • INCISION AND DRAINAGE LEG Right 6/8/2018    Procedure: INCISION AND DRAINAGE RIGHT TRUNK HEMATOMA;  Surgeon: Gerardo Gutierrez MD;  Location:  [facility] OR;  Service: Cardiothoracic   • TONSILLECTOMY     • VASECTOMY  08/1998     Family History   Problem Relation Age of Onset   • Heart disease Mother    • Heart failure Mother    • Pneumonia Father      Social History     Tobacco Use   • Smoking status: Never Smoker   • Smokeless tobacco: Former User     Types: Chew, Snuff   Substance Use Topics   • Alcohol use: Yes     Alcohol/week: 1.0 standard drinks     Types: 1 Cans of beer per week     Comment: occas            PHYSICAL EXAM:   /72 (BP Location: Left arm, Patient Position: Sitting)   Pulse 64   Temp 96.1 °F (35.6 °C)   Ht 175.3 cm (69\")   Wt 78.4 kg (172 lb 12.8 oz)   SpO2 95%   BMI 25.52 kg/m²      Wt Readings from Last 5 Encounters:   05/20/20 78.4 kg (172 lb 12.8 oz)   04/27/20 77.4 kg (170 lb 9.6 oz)   11/22/19 77.6 kg (171 lb)   10/01/19 76 kg (167 lb 9.6 oz)   08/09/19 77.1 kg (170 lb)     BP Readings from Last 5 Encounters:   05/20/20 110/72   04/27/20 142/76   11/22/19 142/84   10/01/19 110/68   08/09/19 136/74       General-Well Nourished, Well developed  Eyes - PERRLA  Neck- supple, No mass  CV- regular rate and rhythm, no MRG  Lung- clear bilaterally  Abd- soft, +BS  Musc/skel - Norm strength and range of motion  Skin- warm and dry  Neuro - Alert & Oriented x 3, appropriate mood.    Medical problems and test results were reviewed with the patient today.     Results " for orders placed or performed in visit on 11/22/19   Comprehensive Metabolic Panel   Result Value Ref Range    Glucose 101 (H) 65 - 99 mg/dL    BUN 19 8 - 23 mg/dL    Creatinine 0.86 0.76 - 1.27 mg/dL    Sodium 142 136 - 145 mmol/L    Potassium 5.5 (H) 3.5 - 5.2 mmol/L    Chloride 105 98 - 107 mmol/L    CO2 26.4 22.0 - 29.0 mmol/L    Calcium 9.4 8.6 - 10.5 mg/dL    Total Protein 7.2 6.0 - 8.5 g/dL    Albumin 4.30 3.50 - 5.20 g/dL    ALT (SGPT) 30 1 - 41 U/L    AST (SGOT) 26 1 - 40 U/L    Alkaline Phosphatase 54 39 - 117 U/L    Total Bilirubin 0.4 0.2 - 1.2 mg/dL    eGFR Non African Amer 88 >60 mL/min/1.73    Globulin 2.9 gm/dL    A/G Ratio 1.5 g/dL    BUN/Creatinine Ratio 22.1 7.0 - 25.0    Anion Gap 10.6 5.0 - 15.0 mmol/L   Lipid Panel   Result Value Ref Range    Total Cholesterol 171 0 - 200 mg/dL    Triglycerides 99 0 - 150 mg/dL    HDL Cholesterol 64 (H) 40 - 60 mg/dL    LDL Cholesterol  87 0 - 100 mg/dL    VLDL Cholesterol 19.8 5 - 40 mg/dL    LDL/HDL Ratio 1.36    Hepatitis C Antibody   Result Value Ref Range    Hepatitis C Ab Non-Reactive Non-Reactive         Lab Results   Component Value Date    CHOL 171 11/22/2019    HDL 64 (H) 11/22/2019    LDL 87 11/22/2019    VLDL 19.8 11/22/2019       EKG:  (EKG/Tracing has been independently visualized by me and summarized below)  Procedures    ASSESSMENT and PLAN  1.  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  We discussed with him therapy options, because of his extensive coronary artery disease, bradycardia and lung disease antiarrhythmic options are limited.  He underwent catheter-based ablation.  The ectopy had greatly been suppressed with sedation therefore paced mapping was used for ablation. We stopped Amio. If it ectopy returns. Consider repeat EP study with ablation vs Tikosyn loading if ectopy returns  2.  Bradycardia- sinus node function has improved post ablation of the  ventricular ectopy  3.  Chest Pain/CAD - Check nuclear stress test and schedule follow up with Dr. Ribeiro to review and address. Will call in nitro.       Return in about 6 months (around 11/20/2020).    1. Nuclear stress test    Brandon Parker M.D., FGABRIELAC, F.H.R.S.  Cardiology/Electrophysiology  05/20/20  11:01

## 2020-06-17 RX ORDER — SERTRALINE HYDROCHLORIDE 100 MG/1
TABLET, FILM COATED ORAL
Qty: 30 TABLET | Refills: 4 | Status: SHIPPED | OUTPATIENT
Start: 2020-06-17 | End: 2021-09-17 | Stop reason: ALTCHOICE

## 2020-06-24 ENCOUNTER — HOSPITAL ENCOUNTER (OUTPATIENT)
Dept: CARDIOLOGY | Facility: HOSPITAL | Age: 70
Discharge: HOME | End: 2020-06-24
Admitting: INTERNAL MEDICINE

## 2020-06-24 VITALS
DIASTOLIC BLOOD PRESSURE: 78 MMHG | HEIGHT: 69 IN | BODY MASS INDEX: 24.44 KG/M2 | WEIGHT: 165 LBS | HEART RATE: 55 BPM | SYSTOLIC BLOOD PRESSURE: 142 MMHG

## 2020-06-24 DIAGNOSIS — I20.8 STABLE ANGINA PECTORIS (HCC): ICD-10-CM

## 2020-06-24 DIAGNOSIS — I25.110 CORONARY ARTERY DISEASE INVOLVING NATIVE CORONARY ARTERY OF NATIVE HEART WITH UNSTABLE ANGINA PECTORIS (HCC): ICD-10-CM

## 2020-06-24 PROCEDURE — 93017 CV STRESS TEST TRACING ONLY: CPT

## 2020-06-24 PROCEDURE — 25010000002 REGADENOSON 0.4 MG/5ML SOLUTION: Performed by: INTERNAL MEDICINE

## 2020-06-24 PROCEDURE — 93018 CV STRESS TEST I&R ONLY: CPT | Performed by: INTERNAL MEDICINE

## 2020-06-24 PROCEDURE — 0 TECHNETIUM SESTAMIBI: Performed by: INTERNAL MEDICINE

## 2020-06-24 PROCEDURE — A9500 TC99M SESTAMIBI: HCPCS | Performed by: INTERNAL MEDICINE

## 2020-06-24 PROCEDURE — 78452 HT MUSCLE IMAGE SPECT MULT: CPT

## 2020-06-24 PROCEDURE — 78452 HT MUSCLE IMAGE SPECT MULT: CPT | Performed by: INTERNAL MEDICINE

## 2020-06-24 RX ADMIN — TECHNETIUM TC 99M SESTAMIBI 1 DOSE: 1 INJECTION INTRAVENOUS at 10:50

## 2020-06-24 RX ADMIN — TECHNETIUM TC 99M SESTAMIBI 1 DOSE: 1 INJECTION INTRAVENOUS at 13:05

## 2020-06-24 RX ADMIN — REGADENOSON 0.4 MG: 0.08 INJECTION, SOLUTION INTRAVENOUS at 13:02

## 2020-06-25 LAB
BH CV STRESS BP STAGE 2: NORMAL
BH CV STRESS BP STAGE 4: NORMAL
BH CV STRESS COMMENTS STAGE 1: NORMAL
BH CV STRESS DOSE REGADENOSON STAGE 1: 0.4
BH CV STRESS DURATION MIN STAGE 1: 1
BH CV STRESS DURATION MIN STAGE 2: 1
BH CV STRESS DURATION MIN STAGE 4: 1
BH CV STRESS DURATION SEC STAGE 2: 0
BH CV STRESS HR STAGE 1: 71
BH CV STRESS HR STAGE 2: 72
BH CV STRESS HR STAGE 3: 72
BH CV STRESS HR STAGE 4: 70
BH CV STRESS PROTOCOL 1: NORMAL
BH CV STRESS RECOVERY BP: NORMAL MMHG
BH CV STRESS RECOVERY HR: 67 BPM
BH CV STRESS STAGE 1: 1
BH CV STRESS STAGE 2: 2
BH CV STRESS STAGE 3: 3
BH CV STRESS STAGE 4: 4
LV EF NUC BP: 71 %
MAXIMAL PREDICTED HEART RATE: 149 BPM
PERCENT MAX PREDICTED HR: 50.34 %
STRESS BASELINE BP: NORMAL MMHG
STRESS BASELINE HR: 53 BPM
STRESS PERCENT HR: 59 %
STRESS POST PEAK BP: NORMAL MMHG
STRESS POST PEAK HR: 75 BPM
STRESS TARGET HR: 127 BPM

## 2020-06-30 RX ORDER — ISOSORBIDE MONONITRATE 60 MG/1
60 TABLET, EXTENDED RELEASE ORAL EVERY MORNING
Qty: 90 TABLET | Refills: 3 | Status: SHIPPED | OUTPATIENT
Start: 2020-06-30 | End: 2021-03-05 | Stop reason: SDUPTHER

## 2020-07-01 ENCOUNTER — TELEPHONE (OUTPATIENT)
Dept: CARDIOLOGY | Facility: CLINIC | Age: 70
End: 2020-07-01

## 2020-07-01 NOTE — TELEPHONE ENCOUNTER
----- Message from Candy Ribeiro MD sent at 6/30/2020  4:29 PM EDT -----  His stress test was abnormal, I discussed with him, he is having occasional chest pain, I have prescribed Imdur 60 mg daily please notify him that it was sent to his pharmacy.  He can keep his scheduled appointment to see me in July and we will reassess at that time.

## 2020-07-10 ENCOUNTER — OFFICE VISIT (OUTPATIENT)
Dept: CARDIOLOGY | Facility: CLINIC | Age: 70
End: 2020-07-10

## 2020-07-10 VITALS
WEIGHT: 171 LBS | TEMPERATURE: 98.4 F | OXYGEN SATURATION: 95 % | SYSTOLIC BLOOD PRESSURE: 116 MMHG | HEART RATE: 75 BPM | HEIGHT: 69 IN | DIASTOLIC BLOOD PRESSURE: 84 MMHG | BODY MASS INDEX: 25.33 KG/M2

## 2020-07-10 DIAGNOSIS — I25.110 CORONARY ARTERY DISEASE INVOLVING NATIVE CORONARY ARTERY OF NATIVE HEART WITH UNSTABLE ANGINA PECTORIS (HCC): Primary | ICD-10-CM

## 2020-07-10 DIAGNOSIS — I10 ESSENTIAL HYPERTENSION: ICD-10-CM

## 2020-07-10 DIAGNOSIS — R94.39 ABNORMAL NUCLEAR STRESS TEST: ICD-10-CM

## 2020-07-10 DIAGNOSIS — E78.01 FAMILIAL HYPERCHOLESTEROLEMIA: ICD-10-CM

## 2020-07-10 DIAGNOSIS — I49.3 FREQUENT PVCS: ICD-10-CM

## 2020-07-10 PROCEDURE — 99214 OFFICE O/P EST MOD 30 MIN: CPT | Performed by: INTERNAL MEDICINE

## 2020-07-10 NOTE — PROGRESS NOTES
"[facility] Cardiology    Encounter Date: 07/10/2020    Patient ID: Enio Tapia is a 70 y.o. male.  : 1950     PCP: Troy Mckay MD       Chief Complaint: Coronary Artery Disease      PROBLEM LIST:  1. Coronary artery disease  a. As/P3 0.5 x 32 Taxus JOHANA mid RCA lesion 10/15/04  b. Normal stress echo 2010  c. Echo 2010 showed normal EF of 60% with trace TR and MR.  usman. C 3/31/17For unstable angina: Diffuse 70% stenosis of mid LAD noted.  Stented with Xience 3.0 x 33 JOHANA reducing stenosis to 0%.  Also 70% discrete stenosis in proximal first diagonal stented with Xience Alpine 2.25 x 15 JOHANA reducing stenosis to 0%.  Previous stenting proximal to mid RCA widely patent. Normal LVEF.  e. Echo 3/31/17: EF 55%.  Mild LVH noted.  f. MPS, 2020: A small-to-medium-sized infarct located in the inferior wall and septal wall with mild valarie-infarct ischemia. The test is remarkable for brief \"chest burning\" which resolved in early recovery. EF 71%.  2. PVCs:  a. 24h Holter, 2018: Min HR 61, mean 70, max 108. 25,091 PVCs, mostly bigeminy.  b. PVC ablation, 2018 - 1. Successful catheter mapping ablation of 2 ventricular foci. 2. Induction of ectopy was difficult. Less than 10 ectopic beats were able to be induced over a 2 hour time period.  c. 48h Holter, 2019: 35404 PVCs, 11.8% burden  3. Hypertension  4. Hyperlipidemia  5. Hypothyroidism  6. Anxiety disorder  7. Glaucoma  8. Osteoarthritis  9. GRABIEL with CPAP compliance.  10. History of horse bite 2018 on the right side of the chest, with severe chest trauma.    History of Present Illness  Patient presents today for a follow-up with a history of coronary artery disease, PVCs, and cardiac risk factors. Since last visit, he had a nuclear stress test for chest pain. This showed evidence of an infarct with mild valarie-infarct ischemia.   Patient states his chest pain has improved since we started him on Imdur " on 7/01/2020, but is still present. Patient reports he recently fell while getting off his boat, landed on the driveway, and now has left sided rib pain and soreness on his chest.     Allergies   Allergen Reactions   • Pravastatin Myalgia   • Statins Myalgia     ESPECIALLY LIPITOR         Current Outpatient Medications:   •  acyclovir (ZOVIRAX) 400 MG tablet, Take 1 tablet by mouth Daily., Disp: 90 tablet, Rfl: 2  •  aspirin 81 MG EC tablet, Take 81 mg by mouth Daily., Disp: , Rfl:   •  clopidogrel (PLAVIX) 75 MG tablet, Take 1 tablet by mouth Daily., Disp: 90 tablet, Rfl: 1  •  Evolocumab 140 MG/ML solution auto-injector, Inject 1 mL under the skin into the appropriate area as directed Every 14 (Fourteen) Days., Disp: 2 pen, Rfl: 11  •  isosorbide mononitrate (IMDUR) 60 MG 24 hr tablet, Take 1 tablet by mouth Every Morning., Disp: 90 tablet, Rfl: 3  •  levothyroxine (SYNTHROID, LEVOTHROID) 150 MCG tablet, Take 1 tablet by mouth Daily., Disp: 90 tablet, Rfl: 1  •  losartan (COZAAR) 50 MG tablet, Take 1 tablet by mouth Daily., Disp: 90 tablet, Rfl: 3  •  Multiple Vitamins-Minerals (CENTRUM SILVER PO), Take 1 tablet by mouth Daily., Disp: , Rfl:   •  nitroglycerin (NITROSTAT) 0.4 MG SL tablet, 1 under the tongue as needed for angina, may repeat q5mins for up three doses, Disp: 100 tablet, Rfl: 11  •  sertraline (ZOLOFT) 100 MG tablet, TAKE ONE TABLET BY MOUTH DAILY, Disp: 30 tablet, Rfl: 4  •  travoprost, BAK free, (TRAVATAN) 0.004 % solution ophthalmic solution, Administer 1 drop to both eyes Every Evening. in affected eye(s) , Disp: , Rfl:     The following portions of the patient's history were reviewed and updated as appropriate: allergies, current medications, past family history, past medical history, past social history, past surgical history and problem list.    ROS  Review of Systems   Constitution: Negative for chills, fever, fatigue, generalized weakness.   Cardiovascular: Negative for dyspnea on exertion,  "leg swelling, palpitations, orthopnea, and syncope. Positive for chest pain  Respiratory: Negative for cough, shortness of breath, and wheezing.  HENT: Negative for ear pain, nosebleeds, and tinnitus.  Gastrointestinal: Negative for abdominal pain, constipation, diarrhea, nausea and vomiting.   Genitourinary: No urinary symptoms.  Musculoskeletal: Negative for muscle cramps.  Neurological: Negative for dizziness, headaches, loss of balance, numbness, and symptoms of stroke.  Psychiatric: Normal mental status.     All other systems reviewed and are negative.        Objective:     /84 (BP Location: Right arm, Patient Position: Sitting)   Pulse 75   Temp 98.4 °F (36.9 °C)   Ht 175.3 cm (69\")   Wt 77.6 kg (171 lb)   SpO2 95%   BMI 25.25 kg/m²      Physical Exam  Constitutional: Patient appears well-developed and well-nourished.   HENT: HEENT exam unremarkable.   Neck: Neck supple. No JVD present. No carotid bruits.   Cardiovascular: Normal rate, regular rhythm and normal heart sounds. No murmur heard.   2+ symmetric pulses.   Pulmonary/Chest: Breath sounds normal. Does not exhibit tenderness.   Abdominal: Abdomen benign.   Musculoskeletal: Does not exhibit edema.   Neurological: Neurological exam unremarkable.   Vitals reviewed.    Lab Review:   Lab Results   Component Value Date    GLUCOSE 101 (H) 11/22/2019    BUN 19 11/22/2019    CREATININE 0.86 11/22/2019    EGFRIFNONA 88 11/22/2019    BCR 22.1 11/22/2019    K 5.5 (H) 11/22/2019    CO2 26.4 11/22/2019    CALCIUM 9.4 11/22/2019    ALBUMIN 4.30 11/22/2019    AST 26 11/22/2019    ALT 30 11/22/2019     Lab Results   Component Value Date    CHOL 171 11/22/2019    CHLPL 263 (H) 06/10/2016    TRIG 99 11/22/2019    HDL 64 (H) 11/22/2019    LDL 87 11/22/2019      Lab Results   Component Value Date    WBC 4.16 06/14/2019    RBC 4.28 06/14/2019    HGB 13.8 06/14/2019    HCT 41.2 06/14/2019    MCV 96.3 06/14/2019     06/14/2019     Lab Results   Component " Value Date    TSH 3.680 06/14/2019     Lab Results   Component Value Date    HGBA1C 5.00 06/29/2018        Procedures       Assessment:      Diagnosis Plan   1. Coronary artery disease involving native coronary artery of native heart with unstable angina pectoris (CMS/HCC)  Left heart catheterization scheduled. Continue aspirin and Plavix for DAPT.  Imdur 60 mg daily and NTG as needed for CP.   2. Abnormal nuclear stress test  Left heart catheterization scheduled.    3. Frequent PVCs  11.8% PVCs on last monitoring in June 2019.    4. Essential hypertension  Well-controlled, continue losartan.   5. Familial hypercholesterolemia  Well-controlled, continue Repatha.     Plan:   Schedule left heart catheterization for CAD with unstable angina and abnormal MPS.  Continue current medications.   FU after procedure.  Thank you for allowing us to participate in the care of your patient.     Scribed for Candy Ribeiro MD by Yamileth Casarez. 7/10/2020  13:13     I, Candy Ribeiro MD, personally performed the services described in this documentation as scribed by the above named individual in my presence, and it is both accurate and complete.  7/10/2020  13:19        Please note that portions of this note may have been completed with a voice recognition program. Efforts were made to edit the dictations, but occasionally words are mistranscribed.

## 2020-07-10 NOTE — H&P (VIEW-ONLY)
"[facility] Cardiology    Encounter Date: 07/10/2020    Patient ID: Enio Tapia is a 70 y.o. male.  : 1950     PCP: Troy Mckay MD       Chief Complaint: Coronary Artery Disease      PROBLEM LIST:  1. Coronary artery disease  a. As/P3 0.5 x 32 Taxus JOHANA mid RCA lesion 10/15/04  b. Normal stress echo 2010  c. Echo 2010 showed normal EF of 60% with trace TR and MR.  usman. C 3/31/17For unstable angina: Diffuse 70% stenosis of mid LAD noted.  Stented with Xience 3.0 x 33 JOHANA reducing stenosis to 0%.  Also 70% discrete stenosis in proximal first diagonal stented with Xience Alpine 2.25 x 15 JOHANA reducing stenosis to 0%.  Previous stenting proximal to mid RCA widely patent. Normal LVEF.  e. Echo 3/31/17: EF 55%.  Mild LVH noted.  f. MPS, 2020: A small-to-medium-sized infarct located in the inferior wall and septal wall with mild valarie-infarct ischemia. The test is remarkable for brief \"chest burning\" which resolved in early recovery. EF 71%.  2. PVCs:  a. 24h Holter, 2018: Min HR 61, mean 70, max 108. 25,091 PVCs, mostly bigeminy.  b. PVC ablation, 2018 - 1. Successful catheter mapping ablation of 2 ventricular foci. 2. Induction of ectopy was difficult. Less than 10 ectopic beats were able to be induced over a 2 hour time period.  c. 48h Holter, 2019: 99839 PVCs, 11.8% burden  3. Hypertension  4. Hyperlipidemia  5. Hypothyroidism  6. Anxiety disorder  7. Glaucoma  8. Osteoarthritis  9. GRABIEL with CPAP compliance.  10. History of horse bite 2018 on the right side of the chest, with severe chest trauma.    History of Present Illness  Patient presents today for a follow-up with a history of coronary artery disease, PVCs, and cardiac risk factors. Since last visit, he had a nuclear stress test for chest pain. This showed evidence of an infarct with mild valarie-infarct ischemia.   Patient states his chest pain has improved since we started him on Imdur " on 7/01/2020, but is still present. Patient reports he recently fell while getting off his boat, landed on the driveway, and now has left sided rib pain and soreness on his chest.     Allergies   Allergen Reactions   • Pravastatin Myalgia   • Statins Myalgia     ESPECIALLY LIPITOR         Current Outpatient Medications:   •  acyclovir (ZOVIRAX) 400 MG tablet, Take 1 tablet by mouth Daily., Disp: 90 tablet, Rfl: 2  •  aspirin 81 MG EC tablet, Take 81 mg by mouth Daily., Disp: , Rfl:   •  clopidogrel (PLAVIX) 75 MG tablet, Take 1 tablet by mouth Daily., Disp: 90 tablet, Rfl: 1  •  Evolocumab 140 MG/ML solution auto-injector, Inject 1 mL under the skin into the appropriate area as directed Every 14 (Fourteen) Days., Disp: 2 pen, Rfl: 11  •  isosorbide mononitrate (IMDUR) 60 MG 24 hr tablet, Take 1 tablet by mouth Every Morning., Disp: 90 tablet, Rfl: 3  •  levothyroxine (SYNTHROID, LEVOTHROID) 150 MCG tablet, Take 1 tablet by mouth Daily., Disp: 90 tablet, Rfl: 1  •  losartan (COZAAR) 50 MG tablet, Take 1 tablet by mouth Daily., Disp: 90 tablet, Rfl: 3  •  Multiple Vitamins-Minerals (CENTRUM SILVER PO), Take 1 tablet by mouth Daily., Disp: , Rfl:   •  nitroglycerin (NITROSTAT) 0.4 MG SL tablet, 1 under the tongue as needed for angina, may repeat q5mins for up three doses, Disp: 100 tablet, Rfl: 11  •  sertraline (ZOLOFT) 100 MG tablet, TAKE ONE TABLET BY MOUTH DAILY, Disp: 30 tablet, Rfl: 4  •  travoprost, BAK free, (TRAVATAN) 0.004 % solution ophthalmic solution, Administer 1 drop to both eyes Every Evening. in affected eye(s) , Disp: , Rfl:     The following portions of the patient's history were reviewed and updated as appropriate: allergies, current medications, past family history, past medical history, past social history, past surgical history and problem list.    ROS  Review of Systems   Constitution: Negative for chills, fever, fatigue, generalized weakness.   Cardiovascular: Negative for dyspnea on exertion,  "leg swelling, palpitations, orthopnea, and syncope. Positive for chest pain  Respiratory: Negative for cough, shortness of breath, and wheezing.  HENT: Negative for ear pain, nosebleeds, and tinnitus.  Gastrointestinal: Negative for abdominal pain, constipation, diarrhea, nausea and vomiting.   Genitourinary: No urinary symptoms.  Musculoskeletal: Negative for muscle cramps.  Neurological: Negative for dizziness, headaches, loss of balance, numbness, and symptoms of stroke.  Psychiatric: Normal mental status.     All other systems reviewed and are negative.        Objective:     /84 (BP Location: Right arm, Patient Position: Sitting)   Pulse 75   Temp 98.4 °F (36.9 °C)   Ht 175.3 cm (69\")   Wt 77.6 kg (171 lb)   SpO2 95%   BMI 25.25 kg/m²      Physical Exam  Constitutional: Patient appears well-developed and well-nourished.   HENT: HEENT exam unremarkable.   Neck: Neck supple. No JVD present. No carotid bruits.   Cardiovascular: Normal rate, regular rhythm and normal heart sounds. No murmur heard.   2+ symmetric pulses.   Pulmonary/Chest: Breath sounds normal. Does not exhibit tenderness.   Abdominal: Abdomen benign.   Musculoskeletal: Does not exhibit edema.   Neurological: Neurological exam unremarkable.   Vitals reviewed.    Lab Review:   Lab Results   Component Value Date    GLUCOSE 101 (H) 11/22/2019    BUN 19 11/22/2019    CREATININE 0.86 11/22/2019    EGFRIFNONA 88 11/22/2019    BCR 22.1 11/22/2019    K 5.5 (H) 11/22/2019    CO2 26.4 11/22/2019    CALCIUM 9.4 11/22/2019    ALBUMIN 4.30 11/22/2019    AST 26 11/22/2019    ALT 30 11/22/2019     Lab Results   Component Value Date    CHOL 171 11/22/2019    CHLPL 263 (H) 06/10/2016    TRIG 99 11/22/2019    HDL 64 (H) 11/22/2019    LDL 87 11/22/2019      Lab Results   Component Value Date    WBC 4.16 06/14/2019    RBC 4.28 06/14/2019    HGB 13.8 06/14/2019    HCT 41.2 06/14/2019    MCV 96.3 06/14/2019     06/14/2019     Lab Results   Component " Value Date    TSH 3.680 06/14/2019     Lab Results   Component Value Date    HGBA1C 5.00 06/29/2018        Procedures       Assessment:      Diagnosis Plan   1. Coronary artery disease involving native coronary artery of native heart with unstable angina pectoris (CMS/HCC)  Left heart catheterization scheduled. Continue aspirin and Plavix for DAPT.  Imdur 60 mg daily and NTG as needed for CP.   2. Abnormal nuclear stress test  Left heart catheterization scheduled.    3. Frequent PVCs  11.8% PVCs on last monitoring in June 2019.    4. Essential hypertension  Well-controlled, continue losartan.   5. Familial hypercholesterolemia  Well-controlled, continue Repatha.     Plan:   Schedule left heart catheterization for CAD with unstable angina and abnormal MPS.  Continue current medications.   FU after procedure.  Thank you for allowing us to participate in the care of your patient.     Scribed for Candy Ribeiro MD by Yamileth Casarez. 7/10/2020  13:13     I, Candy Ribeiro MD, personally performed the services described in this documentation as scribed by the above named individual in my presence, and it is both accurate and complete.  7/10/2020  13:19        Please note that portions of this note may have been completed with a voice recognition program. Efforts were made to edit the dictations, but occasionally words are mistranscribed.

## 2020-07-13 ENCOUNTER — PREP FOR SURGERY (OUTPATIENT)
Dept: OTHER | Facility: HOSPITAL | Age: 70
End: 2020-07-13

## 2020-07-13 DIAGNOSIS — R94.39 ABNORMAL STRESS TEST: Primary | ICD-10-CM

## 2020-07-13 PROBLEM — I20.0 UNSTABLE ANGINA: Status: ACTIVE | Noted: 2020-07-13

## 2020-07-13 RX ORDER — NITROGLYCERIN 0.4 MG/1
0.4 TABLET SUBLINGUAL
Status: CANCELLED | OUTPATIENT
Start: 2020-07-13

## 2020-07-13 RX ORDER — SODIUM CHLORIDE 0.9 % (FLUSH) 0.9 %
3 SYRINGE (ML) INJECTION EVERY 12 HOURS SCHEDULED
Status: CANCELLED | OUTPATIENT
Start: 2020-07-13

## 2020-07-13 RX ORDER — ASPIRIN 325 MG
325 TABLET ORAL ONCE
Status: CANCELLED | OUTPATIENT
Start: 2020-07-13 | End: 2020-07-13

## 2020-07-13 RX ORDER — ONDANSETRON 2 MG/ML
4 INJECTION INTRAMUSCULAR; INTRAVENOUS EVERY 8 HOURS PRN
Status: CANCELLED | OUTPATIENT
Start: 2020-07-13

## 2020-07-13 RX ORDER — SODIUM CHLORIDE 0.9 % (FLUSH) 0.9 %
10 SYRINGE (ML) INJECTION AS NEEDED
Status: CANCELLED | OUTPATIENT
Start: 2020-07-13

## 2020-07-13 RX ORDER — ASPIRIN 325 MG
325 TABLET, DELAYED RELEASE (ENTERIC COATED) ORAL DAILY
Status: CANCELLED | OUTPATIENT
Start: 2020-07-14

## 2020-07-14 ENCOUNTER — APPOINTMENT (OUTPATIENT)
Dept: PREADMISSION TESTING | Facility: HOSPITAL | Age: 70
End: 2020-07-14

## 2020-07-14 PROCEDURE — C9803 HOPD COVID-19 SPEC COLLECT: HCPCS

## 2020-07-14 PROCEDURE — U0002 COVID-19 LAB TEST NON-CDC: HCPCS

## 2020-07-14 PROCEDURE — U0004 COV-19 TEST NON-CDC HGH THRU: HCPCS

## 2020-07-15 LAB
REF LAB TEST METHOD: NORMAL
SARS-COV-2 RNA RESP QL NAA+PROBE: NOT DETECTED

## 2020-07-16 ENCOUNTER — HOSPITAL ENCOUNTER (OUTPATIENT)
Facility: HOSPITAL | Age: 70
Discharge: HOME | End: 2020-07-16
Attending: INTERNAL MEDICINE | Admitting: INTERNAL MEDICINE

## 2020-07-16 VITALS
HEIGHT: 69 IN | WEIGHT: 169.09 LBS | OXYGEN SATURATION: 95 % | TEMPERATURE: 97.1 F | HEART RATE: 56 BPM | RESPIRATION RATE: 16 BRPM | SYSTOLIC BLOOD PRESSURE: 132 MMHG | DIASTOLIC BLOOD PRESSURE: 102 MMHG | BODY MASS INDEX: 25.04 KG/M2

## 2020-07-16 DIAGNOSIS — I20.0 UNSTABLE ANGINA (HCC): ICD-10-CM

## 2020-07-16 DIAGNOSIS — R94.39 ABNORMAL STRESS TEST: ICD-10-CM

## 2020-07-16 LAB
ALBUMIN SERPL-MCNC: 4.2 G/DL (ref 3.5–5.2)
ALBUMIN/GLOB SERPL: 1.9 G/DL
ALP SERPL-CCNC: 111 U/L (ref 39–117)
ALT SERPL W P-5'-P-CCNC: 22 U/L (ref 1–41)
ANION GAP SERPL CALCULATED.3IONS-SCNC: 9 MMOL/L (ref 5–15)
AST SERPL-CCNC: 17 U/L (ref 1–40)
BASOPHILS # BLD AUTO: 0.05 10*3/MM3 (ref 0–0.2)
BASOPHILS NFR BLD AUTO: 1.1 % (ref 0–1.5)
BILIRUB SERPL-MCNC: 0.4 MG/DL (ref 0–1.2)
BUN SERPL-MCNC: 24 MG/DL (ref 8–23)
BUN/CREAT SERPL: 24 (ref 7–25)
CALCIUM SPEC-SCNC: 9 MG/DL (ref 8.6–10.5)
CHLORIDE SERPL-SCNC: 107 MMOL/L (ref 98–107)
CHOLEST SERPL-MCNC: 135 MG/DL (ref 0–200)
CO2 SERPL-SCNC: 23 MMOL/L (ref 22–29)
CREAT SERPL-MCNC: 1 MG/DL (ref 0.76–1.27)
DEPRECATED RDW RBC AUTO: 45.1 FL (ref 37–54)
EOSINOPHIL # BLD AUTO: 0.21 10*3/MM3 (ref 0–0.4)
EOSINOPHIL NFR BLD AUTO: 4.5 % (ref 0.3–6.2)
ERYTHROCYTE [DISTWIDTH] IN BLOOD BY AUTOMATED COUNT: 13.2 % (ref 12.3–15.4)
GFR SERPL CREATININE-BSD FRML MDRD: 74 ML/MIN/1.73
GLOBULIN UR ELPH-MCNC: 2.2 GM/DL
GLUCOSE SERPL-MCNC: 100 MG/DL (ref 65–99)
HBA1C MFR BLD: 5.2 % (ref 4.8–5.6)
HCT VFR BLD AUTO: 40.1 % (ref 37.5–51)
HDLC SERPL-MCNC: 51 MG/DL (ref 40–60)
HGB BLD-MCNC: 13.5 G/DL (ref 13–17.7)
IMM GRANULOCYTES # BLD AUTO: 0.01 10*3/MM3 (ref 0–0.05)
IMM GRANULOCYTES NFR BLD AUTO: 0.2 % (ref 0–0.5)
LDLC SERPL CALC-MCNC: 55 MG/DL (ref 0–100)
LDLC/HDLC SERPL: 1.09 {RATIO}
LYMPHOCYTES # BLD AUTO: 1.6 10*3/MM3 (ref 0.7–3.1)
LYMPHOCYTES NFR BLD AUTO: 34.4 % (ref 19.6–45.3)
MCH RBC QN AUTO: 31.7 PG (ref 26.6–33)
MCHC RBC AUTO-ENTMCNC: 33.7 G/DL (ref 31.5–35.7)
MCV RBC AUTO: 94.1 FL (ref 79–97)
MONOCYTES # BLD AUTO: 0.38 10*3/MM3 (ref 0.1–0.9)
MONOCYTES NFR BLD AUTO: 8.2 % (ref 5–12)
NEUTROPHILS NFR BLD AUTO: 2.4 10*3/MM3 (ref 1.7–7)
NEUTROPHILS NFR BLD AUTO: 51.6 % (ref 42.7–76)
NRBC BLD AUTO-RTO: 0 /100 WBC (ref 0–0.2)
PLATELET # BLD AUTO: 171 10*3/MM3 (ref 140–450)
PMV BLD AUTO: 8.6 FL (ref 6–12)
POTASSIUM SERPL-SCNC: 4.4 MMOL/L (ref 3.5–5.2)
PROT SERPL-MCNC: 6.4 G/DL (ref 6–8.5)
RBC # BLD AUTO: 4.26 10*6/MM3 (ref 4.14–5.8)
SODIUM SERPL-SCNC: 139 MMOL/L (ref 136–145)
TRIGL SERPL-MCNC: 143 MG/DL (ref 0–150)
VLDLC SERPL-MCNC: 28.6 MG/DL
WBC # BLD AUTO: 4.65 10*3/MM3 (ref 3.4–10.8)

## 2020-07-16 PROCEDURE — C1725 CATH, TRANSLUMIN NON-LASER: HCPCS | Performed by: INTERNAL MEDICINE

## 2020-07-16 PROCEDURE — 93458 L HRT ARTERY/VENTRICLE ANGIO: CPT | Performed by: INTERNAL MEDICINE

## 2020-07-16 PROCEDURE — 85025 COMPLETE CBC W/AUTO DIFF WBC: CPT | Performed by: INTERNAL MEDICINE

## 2020-07-16 PROCEDURE — C1887 CATHETER, GUIDING: HCPCS | Performed by: INTERNAL MEDICINE

## 2020-07-16 PROCEDURE — 36415 COLL VENOUS BLD VENIPUNCTURE: CPT

## 2020-07-16 PROCEDURE — 25010000002 HEPARIN (PORCINE) PER 1000 UNITS: Performed by: INTERNAL MEDICINE

## 2020-07-16 PROCEDURE — C1769 GUIDE WIRE: HCPCS | Performed by: INTERNAL MEDICINE

## 2020-07-16 PROCEDURE — 80061 LIPID PANEL: CPT | Performed by: INTERNAL MEDICINE

## 2020-07-16 PROCEDURE — 92921 PR PRQ TRLUML CORONARY ANGIOPLASTY ADDL BRANCH: CPT | Performed by: INTERNAL MEDICINE

## 2020-07-16 PROCEDURE — 85347 COAGULATION TIME ACTIVATED: CPT

## 2020-07-16 PROCEDURE — C1874 STENT, COATED/COV W/DEL SYS: HCPCS | Performed by: INTERNAL MEDICINE

## 2020-07-16 PROCEDURE — 80053 COMPREHEN METABOLIC PANEL: CPT | Performed by: INTERNAL MEDICINE

## 2020-07-16 PROCEDURE — 93010 ELECTROCARDIOGRAM REPORT: CPT | Performed by: INTERNAL MEDICINE

## 2020-07-16 PROCEDURE — C1894 INTRO/SHEATH, NON-LASER: HCPCS | Performed by: INTERNAL MEDICINE

## 2020-07-16 PROCEDURE — 83036 HEMOGLOBIN GLYCOSYLATED A1C: CPT | Performed by: INTERNAL MEDICINE

## 2020-07-16 PROCEDURE — 25010000002 MIDAZOLAM PER 1 MG: Performed by: INTERNAL MEDICINE

## 2020-07-16 PROCEDURE — C9600 PERC DRUG-EL COR STENT SING: HCPCS | Performed by: INTERNAL MEDICINE

## 2020-07-16 PROCEDURE — 93005 ELECTROCARDIOGRAM TRACING: CPT | Performed by: INTERNAL MEDICINE

## 2020-07-16 PROCEDURE — 92921: CPT | Performed by: INTERNAL MEDICINE

## 2020-07-16 PROCEDURE — 92928 PRQ TCAT PLMT NTRAC ST 1 LES: CPT | Performed by: INTERNAL MEDICINE

## 2020-07-16 PROCEDURE — 0 IOPAMIDOL PER 1 ML: Performed by: INTERNAL MEDICINE

## 2020-07-16 DEVICE — XIENCE SIERRA™ EVEROLIMUS ELUTING CORONARY STENT SYSTEM 2.50 MM X 28 MM / RAPID-EXCHANGE
Type: IMPLANTABLE DEVICE | Status: FUNCTIONAL
Brand: XIENCE SIERRA™

## 2020-07-16 DEVICE — XIENCE SIERRA™ EVEROLIMUS ELUTING CORONARY STENT SYSTEM 4.00 MM X 18 MM / RAPID-EXCHANGE
Type: IMPLANTABLE DEVICE | Status: FUNCTIONAL
Brand: XIENCE SIERRA™

## 2020-07-16 RX ORDER — SODIUM CHLORIDE 9 MG/ML
100 INJECTION, SOLUTION INTRAVENOUS CONTINUOUS
Status: ACTIVE | OUTPATIENT
Start: 2020-07-16 | End: 2020-07-16

## 2020-07-16 RX ORDER — ASPIRIN 325 MG
325 TABLET ORAL ONCE
Status: COMPLETED | OUTPATIENT
Start: 2020-07-16 | End: 2020-07-16

## 2020-07-16 RX ORDER — MIDAZOLAM HYDROCHLORIDE 1 MG/ML
INJECTION INTRAMUSCULAR; INTRAVENOUS AS NEEDED
Status: DISCONTINUED | OUTPATIENT
Start: 2020-07-16 | End: 2020-07-16 | Stop reason: HOSPADM

## 2020-07-16 RX ORDER — ONDANSETRON 2 MG/ML
4 INJECTION INTRAMUSCULAR; INTRAVENOUS EVERY 8 HOURS PRN
Status: DISCONTINUED | OUTPATIENT
Start: 2020-07-16 | End: 2020-07-16 | Stop reason: HOSPADM

## 2020-07-16 RX ORDER — ACETAMINOPHEN 325 MG/1
650 TABLET ORAL EVERY 4 HOURS PRN
Status: DISCONTINUED | OUTPATIENT
Start: 2020-07-16 | End: 2020-07-16 | Stop reason: HOSPADM

## 2020-07-16 RX ORDER — ASPIRIN 325 MG
325 TABLET, DELAYED RELEASE (ENTERIC COATED) ORAL DAILY
Status: DISCONTINUED | OUTPATIENT
Start: 2020-07-17 | End: 2020-07-16 | Stop reason: HOSPADM

## 2020-07-16 RX ORDER — LIDOCAINE HYDROCHLORIDE 10 MG/ML
INJECTION, SOLUTION EPIDURAL; INFILTRATION; INTRACAUDAL; PERINEURAL AS NEEDED
Status: DISCONTINUED | OUTPATIENT
Start: 2020-07-16 | End: 2020-07-16 | Stop reason: HOSPADM

## 2020-07-16 RX ORDER — SODIUM CHLORIDE 0.9 % (FLUSH) 0.9 %
3 SYRINGE (ML) INJECTION EVERY 12 HOURS SCHEDULED
Status: DISCONTINUED | OUTPATIENT
Start: 2020-07-16 | End: 2020-07-16 | Stop reason: HOSPADM

## 2020-07-16 RX ORDER — NITROGLYCERIN 0.4 MG/1
0.4 TABLET SUBLINGUAL
Status: DISCONTINUED | OUTPATIENT
Start: 2020-07-16 | End: 2020-07-16 | Stop reason: HOSPADM

## 2020-07-16 RX ORDER — SODIUM CHLORIDE 9 MG/ML
3 INJECTION, SOLUTION INTRAVENOUS CONTINUOUS
Status: ACTIVE | OUTPATIENT
Start: 2020-07-16 | End: 2020-07-16

## 2020-07-16 RX ORDER — HEPARIN SODIUM 1000 [USP'U]/ML
INJECTION, SOLUTION INTRAVENOUS; SUBCUTANEOUS AS NEEDED
Status: DISCONTINUED | OUTPATIENT
Start: 2020-07-16 | End: 2020-07-16 | Stop reason: HOSPADM

## 2020-07-16 RX ORDER — SODIUM CHLORIDE 0.9 % (FLUSH) 0.9 %
10 SYRINGE (ML) INJECTION AS NEEDED
Status: DISCONTINUED | OUTPATIENT
Start: 2020-07-16 | End: 2020-07-16 | Stop reason: HOSPADM

## 2020-07-16 RX ORDER — CLOPIDOGREL BISULFATE 75 MG/1
TABLET ORAL AS NEEDED
Status: DISCONTINUED | OUTPATIENT
Start: 2020-07-16 | End: 2020-07-16 | Stop reason: HOSPADM

## 2020-07-16 RX ADMIN — SODIUM CHLORIDE 3 ML/KG/HR: 9 INJECTION, SOLUTION INTRAVENOUS at 06:57

## 2020-07-16 RX ADMIN — ASPIRIN 325 MG ORAL TABLET 325 MG: 325 PILL ORAL at 06:57

## 2020-07-16 NOTE — INTERVAL H&P NOTE
H&P reviewed. The patient was examined and there are no changes to the H&P.      Pre-cardiac Catheterization Report  Cardiovascular Laboratory  [facility]    Patient:  Enio Tapia  :  1950  PCP:  Troy Mckay MD  PHONE:  [phone]    DATE: 2020      MEDICATIONS:  Prior to Admission medications    Medication Sig Start Date End Date Taking? Authorizing Provider   acyclovir (ZOVIRAX) 400 MG tablet Take 1 tablet by mouth Daily. 20  Yes Troy Mckay MD   aspirin 81 MG EC tablet Take 81 mg by mouth Every Night.   Yes Fay Kiran MD   clopidogrel (PLAVIX) 75 MG tablet Take 1 tablet by mouth Daily. 20  Yes Troy Mckay MD   Evolocumab 140 MG/ML solution auto-injector Inject 1 mL under the skin into the appropriate area as directed Every 14 (Fourteen) Days. 19  Yes Candy Ribeiro MD   isosorbide mononitrate (IMDUR) 60 MG 24 hr tablet Take 1 tablet by mouth Every Morning. 20  Yes Candy Ribeiro MD   levothyroxine (SYNTHROID, LEVOTHROID) 150 MCG tablet Take 1 tablet by mouth Daily.  Patient taking differently: Take 150 mcg by mouth Every Night. 20  Yes Troy Mckay MD   losartan (COZAAR) 50 MG tablet Take 1 tablet by mouth Daily.  Patient taking differently: Take 50 mg by mouth Every Night. 20  Yes Troy Mckay MD   Multiple Vitamins-Minerals (CENTRUM SILVER PO) Take 1 tablet by mouth Daily.   Yes Fay Kiran MD   nitroglycerin (NITROSTAT) 0.4 MG SL tablet 1 under the tongue as needed for angina, may repeat q5mins for up three doses 20  Yes Brandon Parker MD   sertraline (ZOLOFT) 100 MG tablet TAKE ONE TABLET BY MOUTH DAILY  Patient taking differently: Take 100 mg by mouth Every Night. 20  Yes Troy Mckay MD   travoprost, BAK free, (TRAVATAN) 0.004 % solution ophthalmic solution Administer 1 drop to both eyes Every Evening. in affected eye(s)    Yes Fay Kiran MD       Past medical &  surgical history, social and family history reviewed in the electronic medical record.    Physical Exam:    Vitals:   Vitals:    07/16/20 0632   BP: 160/82   Pulse: 55   Temp: 97.1 °F (36.2 °C)   SpO2: 96%          07/16/20  0631   Weight: 76.7 kg (169 lb 1.5 oz)   Body mass index is 24.97 kg/m².    GENERAL: No apparent distress.  No significant changes since last exam.  CHEST: Clear to auscultation bilaterally no stridor no wheeze.  CV: S1, S2, Regular without Murmurs, Rubs or Gallops  EXTREMITIES: No edema.    Barbaeu Test:  Left: Normal  (oxymetric Allens) Right: Not Assessed       Results from last 7 days   Lab Units 07/16/20  0637   SODIUM mmol/L 139   POTASSIUM mmol/L 4.4   CHLORIDE mmol/L 107   CO2 mmol/L 23.0   BUN mg/dL 24*   CREATININE mg/dL 1.00   GLUCOSE mg/dL 100*   CALCIUM mg/dL 9.0     Results from last 7 days   Lab Units 07/16/20  0637   WBC 10*3/mm3 4.65   HEMOGLOBIN g/dL 13.5   HEMATOCRIT % 40.1   PLATELETS 10*3/mm3 171     Lab Results   Component Value Date    CHOL 135 07/16/2020    CHOL 171 11/22/2019    CHOL 122 06/14/2019    CHLPL 263 (H) 06/10/2016    CHLPL 283 (H) 12/14/2015    CHLPL 244 (H) 01/08/2015     Lab Results   Component Value Date    TRIG 143 07/16/2020    TRIG 99 11/22/2019    TRIG 72 06/14/2019     Lab Results   Component Value Date    HDL 51 07/16/2020    HDL 64 (H) 11/22/2019    HDL 59 06/14/2019     Lab Results   Component Value Date    LDL 55 07/16/2020    LDL 87 11/22/2019    LDL 49 06/14/2019         Estimated Creatinine Clearance: 73.5 mL/min (by C-G formula based on SCr of 1 mg/dL).    IMPRESSION:  Anginal class in last 2 weeks:  CCS class IV    PLAN:  · Procedure to perform: LHC  · Planned access:  L wrist      Electronically signed by FRANCO Taylor, 07/16/20, 7:51 AM.

## 2020-07-17 ENCOUNTER — DOCUMENTATION (OUTPATIENT)
Dept: CARDIAC REHAB | Facility: HOSPITAL | Age: 70
End: 2020-07-17

## 2020-07-17 ENCOUNTER — CALL CENTER PROGRAMS (OUTPATIENT)
Dept: CALL CENTER | Facility: HOSPITAL | Age: 70
End: 2020-07-17

## 2020-07-17 LAB — ACT BLD: 263 SECONDS (ref 82–152)

## 2020-07-17 NOTE — OUTREACH NOTE
PCI Survey      Responses   Facility patient discharged from?  [place]   Procedure date  07/16/20   PCI site:  Left, Arm   Performing MD Dr. Enio Soto   Attempt successful?  Yes   Call start time  0947   Call end time  0950   Is the patient taking prescribed medications:  ASA, Plavix   Nursing intervention  Reminded to continue to take prescribed medications   Nursing intervention  Patient education provided   Does the patient have an appointment scheduled with the cardiologist?  Yes   Did the patient feel prepared to go home on the same day as the procedure?  Yes   Is the patient satisfied with the same day discharge process?  Yes   PCI call completed  Yes          Chad Mcconnell RN

## 2020-07-20 ENCOUNTER — OFFICE VISIT (OUTPATIENT)
Dept: FAMILY MEDICINE CLINIC | Facility: CLINIC | Age: 70
End: 2020-07-20

## 2020-07-20 VITALS
TEMPERATURE: 97.7 F | SYSTOLIC BLOOD PRESSURE: 134 MMHG | WEIGHT: 171 LBS | HEIGHT: 69 IN | OXYGEN SATURATION: 99 % | RESPIRATION RATE: 18 BRPM | BODY MASS INDEX: 25.33 KG/M2 | HEART RATE: 62 BPM | DIASTOLIC BLOOD PRESSURE: 70 MMHG

## 2020-07-20 DIAGNOSIS — E78.01 FAMILIAL HYPERCHOLESTEROLEMIA: ICD-10-CM

## 2020-07-20 DIAGNOSIS — I10 ESSENTIAL HYPERTENSION: ICD-10-CM

## 2020-07-20 DIAGNOSIS — F51.02 ADJUSTMENT INSOMNIA: ICD-10-CM

## 2020-07-20 DIAGNOSIS — Z00.00 MEDICARE ANNUAL WELLNESS VISIT, SUBSEQUENT: Primary | ICD-10-CM

## 2020-07-20 DIAGNOSIS — Z12.5 PROSTATE CANCER SCREENING: ICD-10-CM

## 2020-07-20 LAB
ALBUMIN SERPL-MCNC: 4.4 G/DL (ref 3.5–5.2)
ALBUMIN/GLOB SERPL: 2.2 G/DL
ALP SERPL-CCNC: 121 U/L (ref 39–117)
ALT SERPL-CCNC: 24 U/L (ref 1–41)
AST SERPL-CCNC: 16 U/L (ref 1–40)
BILIRUB SERPL-MCNC: 0.3 MG/DL (ref 0–1.2)
BUN SERPL-MCNC: 21 MG/DL (ref 8–23)
BUN/CREAT SERPL: 19.8 (ref 7–25)
CALCIUM SERPL-MCNC: 9.3 MG/DL (ref 8.6–10.5)
CHLORIDE SERPL-SCNC: 104 MMOL/L (ref 98–107)
CHOLEST SERPL-MCNC: 126 MG/DL (ref 0–200)
CO2 SERPL-SCNC: 26.2 MMOL/L (ref 22–29)
CREAT SERPL-MCNC: 1.06 MG/DL (ref 0.76–1.27)
ERYTHROCYTE [DISTWIDTH] IN BLOOD BY AUTOMATED COUNT: 13.4 % (ref 12.3–15.4)
GLOBULIN SER CALC-MCNC: 2 GM/DL
GLUCOSE SERPL-MCNC: 103 MG/DL (ref 65–99)
HCT VFR BLD AUTO: 42.5 % (ref 37.5–51)
HDLC SERPL-MCNC: 47 MG/DL (ref 40–60)
HGB BLD-MCNC: 14.1 G/DL (ref 13–17.7)
LDLC SERPL CALC-MCNC: 52 MG/DL (ref 0–100)
MCH RBC QN AUTO: 31.8 PG (ref 26.6–33)
MCHC RBC AUTO-ENTMCNC: 33.2 G/DL (ref 31.5–35.7)
MCV RBC AUTO: 95.9 FL (ref 79–97)
PLATELET # BLD AUTO: 183 10*3/MM3 (ref 140–450)
POTASSIUM SERPL-SCNC: 5.3 MMOL/L (ref 3.5–5.2)
PROT SERPL-MCNC: 6.4 G/DL (ref 6–8.5)
PSA SERPL-MCNC: 1.48 NG/ML (ref 0–4)
RBC # BLD AUTO: 4.43 10*6/MM3 (ref 4.14–5.8)
SODIUM SERPL-SCNC: 139 MMOL/L (ref 136–145)
TRIGL SERPL-MCNC: 133 MG/DL (ref 0–150)
TSH SERPL DL<=0.005 MIU/L-ACNC: 8.3 UIU/ML (ref 0.27–4.2)
VLDLC SERPL CALC-MCNC: 26.6 MG/DL
WBC # BLD AUTO: 4.9 10*3/MM3 (ref 3.4–10.8)

## 2020-07-20 PROCEDURE — 96160 PT-FOCUSED HLTH RISK ASSMT: CPT | Performed by: FAMILY MEDICINE

## 2020-07-20 PROCEDURE — G0439 PPPS, SUBSEQ VISIT: HCPCS | Performed by: FAMILY MEDICINE

## 2020-07-20 RX ORDER — ZOLPIDEM TARTRATE 5 MG/1
5 TABLET ORAL NIGHTLY PRN
Qty: 30 TABLET | Refills: 0 | Status: SHIPPED | OUTPATIENT
Start: 2020-07-20 | End: 2020-11-24

## 2020-07-20 NOTE — PROGRESS NOTES
The ABCs of the Annual Wellness Visit  Subsequent Medicare Wellness Visit    Chief Complaint   Patient presents with   • Annual Exam       Subjective   History of Present Illness:  Enio Tapia is a 70 y.o. male who presents for a Subsequent Medicare Wellness Visit.    HEALTH RISK ASSESSMENT    Recent Hospitalizations:  Recently treated at the following:  [facility]    Current Medical Providers:  Patient Care Team:  Troy Mckay MD as PCP - General (Family Medicine)  Troy Mckay MD as PCP - Family Medicine  Candy Ribeiro MD as Consulting Physician (Cardiology)    Smoking Status:  Social History     Tobacco Use   Smoking Status Never Smoker   Smokeless Tobacco Former User   • Types: Chew, Snuff       Alcohol Consumption:  Social History     Substance and Sexual Activity   Alcohol Use Yes   • Alcohol/week: 1.0 standard drinks   • Types: 1 Cans of beer per week    Comment: occas       Depression Screen:   PHQ-2/PHQ-9 Depression Screening 7/20/2020   Little interest or pleasure in doing things 0   Feeling down, depressed, or hopeless 0   Total Score 0       Fall Risk Screen:  STEADI Fall Risk Assessment was completed, and patient is at LOW risk for falls.Assessment completed on:7/20/2020    Health Habits and Functional and Cognitive Screening:  No flowsheet data found.      Does the patient have evidence of cognitive impairment? No    Asprin use counseling:Taking ASA appropriately as indicated    Age-appropriate Screening Schedule:  Refer to the list below for future screening recommendations based on patient's age, sex and/or medical conditions. Orders for these recommended tests are listed in the plan section. The patient has been provided with a written plan.    Health Maintenance   Topic Date Due   • ZOSTER VACCINE (2 of 2) 02/07/2013   • INFLUENZA VACCINE  08/01/2020   • COLONOSCOPY  12/15/2020   • LIPID PANEL  07/16/2021   • TDAP/TD VACCINES (3 - Td) 05/19/2028          The following  portions of the patient's history were reviewed and updated as appropriate: allergies, current medications, past family history, past medical history, past social history, past surgical history and problem list.    Outpatient Medications Prior to Visit   Medication Sig Dispense Refill   • acyclovir (ZOVIRAX) 400 MG tablet Take 1 tablet by mouth Daily. 90 tablet 2   • aspirin 81 MG EC tablet Take 81 mg by mouth Every Night.     • clopidogrel (PLAVIX) 75 MG tablet Take 1 tablet by mouth Daily. 90 tablet 1   • Evolocumab 140 MG/ML solution auto-injector Inject 1 mL under the skin into the appropriate area as directed Every 14 (Fourteen) Days. 2 pen 11   • isosorbide mononitrate (IMDUR) 60 MG 24 hr tablet Take 1 tablet by mouth Every Morning. 90 tablet 3   • levothyroxine (SYNTHROID, LEVOTHROID) 150 MCG tablet Take 1 tablet by mouth Daily. (Patient taking differently: Take 150 mcg by mouth Every Night.) 90 tablet 1   • losartan (COZAAR) 50 MG tablet Take 1 tablet by mouth Daily. (Patient taking differently: Take 50 mg by mouth Every Night.) 90 tablet 3   • Multiple Vitamins-Minerals (CENTRUM SILVER PO) Take 1 tablet by mouth Daily.     • nitroglycerin (NITROSTAT) 0.4 MG SL tablet 1 under the tongue as needed for angina, may repeat q5mins for up three doses 100 tablet 11   • sertraline (ZOLOFT) 100 MG tablet TAKE ONE TABLET BY MOUTH DAILY (Patient taking differently: Take 100 mg by mouth Every Night.) 30 tablet 4   • travoprost, BAK free, (TRAVATAN) 0.004 % solution ophthalmic solution Administer 1 drop to both eyes Every Evening. in affected eye(s)        No facility-administered medications prior to visit.        Patient Active Problem List   Diagnosis   • Hyperlipidemia   • Hypertension   • Acquired hypothyroidism   • Irritable bowel syndrome   • Sleep apnea   • Generalized anxiety disorder   • Inguinal hernia   • Herpes simplex infection   • Coronary artery disease involving native coronary artery of native heart  "with unstable angina pectoris (CMS/HCC)   • Hematoma of right chest wall   • Frequent unifocal PVCs   • Frequent PVCs   • Rib contusion, left, initial encounter   • Stable angina pectoris (CMS/HCC)   • Abnormal stress test   • Unstable angina (CMS/HCC)       Advanced Care Planning:  ACP discussion was held with the patient during this visit. Patient does not have an advance directive, information provided.    Review of Systems   Constitutional: Negative for activity change and unexpected weight change.   HENT: Negative for congestion and sore throat.    Eyes:        Eye exam yearly   Respiratory: Negative for cough and shortness of breath.    Cardiovascular: Negative for chest pain, palpitations and leg swelling.        Recent heart cath and stenting   Gastrointestinal: Negative for diarrhea, nausea and vomiting.   Endocrine: Negative for cold intolerance and heat intolerance.   Genitourinary: Negative for dysuria and hematuria.        Nocturia x1   Musculoskeletal: Positive for arthralgias. Negative for joint swelling.        Arthritis in the hands   Skin: Negative for color change and rash.   Allergic/Immunologic: Negative for environmental allergies and food allergies.   Neurological: Negative for syncope and headaches.   Hematological: Negative for adenopathy. Does not bruise/bleed easily.   Psychiatric/Behavioral: Positive for sleep disturbance. Negative for dysphoric mood. The patient is not nervous/anxious.        Compared to one year ago, the patient feels his physical health is the same.  Compared to one year ago, the patient feels his mental health is the same.    Reviewed chart for potential of high risk medication in the elderly: yes  Reviewed chart for potential of harmful drug interactions in the elderly:not applicable    Objective         Vitals:    07/20/20 0907   BP: 134/70   Pulse: 62   Resp: 18   Temp: 97.7 °F (36.5 °C)   SpO2: 99%   Weight: 77.6 kg (171 lb)   Height: 175.3 cm (69\")       Body " mass index is 25.25 kg/m².  Discussed the patient's BMI with him. The BMI is above average; BMI management plan is completed.    Physical Exam   Constitutional: He is oriented to person, place, and time. He appears well-developed and well-nourished. He is cooperative.   HENT:   Head: Normocephalic and atraumatic.   Eyes: Pupils are equal, round, and reactive to light. Conjunctivae are normal. No scleral icterus.   Neck: Neck supple. No JVD present. Carotid bruit is not present. No thyromegaly present.   Cardiovascular: Normal rate, regular rhythm and normal heart sounds.   Pulmonary/Chest: Effort normal and breath sounds normal. He has no wheezes. He has no rales.   Abdominal: Soft. Bowel sounds are normal. He exhibits no mass. There is no hepatosplenomegaly.   Musculoskeletal: Normal range of motion. He exhibits no edema or tenderness.   Neurological: He is alert and oriented to person, place, and time.   No focal deficits no lateralizing signs   Skin: Skin is warm and dry. No rash noted.   Psychiatric: He has a normal mood and affect. Cognition and memory are normal.   Nursing note and vitals reviewed.      Lab Results   Component Value Date    TRIG 143 07/16/2020    HDL 51 07/16/2020    LDL 55 07/16/2020    VLDL 28.6 07/16/2020    HGBA1C 5.20 07/16/2020        Assessment/Plan   Medicare Risks and Personalized Health Plan  CMS Preventative Services Quick Reference  Cardiovascular risk  Immunizations Discussed/Encouraged (specific immunizations; Td )    The above risks/problems have been discussed with the patient.  Pertinent information has been shared with the patient in the After Visit Summary.  Follow up plans and orders are seen below in the Assessment/Plan Section.    Diagnoses and all orders for this visit:    1. Medicare annual wellness visit, subsequent (Primary)  -     Comprehensive Metabolic Panel  -     Lipid Panel  -     TSH  -     CBC (No Diff)  -     POC Urinalysis Dipstick, Automated    2. Essential  hypertension  -     Comprehensive Metabolic Panel    3. Familial hypercholesterolemia  -     Lipid Panel    4. Prostate cancer screening  -     PSA Screen    5. Adjustment insomnia  -     zolpidem (Ambien) 5 MG tablet; Take 1 tablet by mouth At Night As Needed for Sleep.  Dispense: 30 tablet; Refill: 0      Follow Up:  Return in about 6 months (around 1/20/2021) for fasting appt.     An After Visit Summary and PPPS were given to the patient.

## 2020-07-21 ENCOUNTER — DOCUMENTATION (OUTPATIENT)
Dept: CARDIAC REHAB | Facility: HOSPITAL | Age: 70
End: 2020-07-21

## 2020-07-27 RX ORDER — LOSARTAN POTASSIUM 100 MG/1
TABLET ORAL
Qty: 45 TABLET | Refills: 5 | Status: SHIPPED | OUTPATIENT
Start: 2020-07-27 | End: 2020-09-04

## 2020-09-04 ENCOUNTER — OFFICE VISIT (OUTPATIENT)
Dept: CARDIOLOGY | Facility: CLINIC | Age: 70
End: 2020-09-04

## 2020-09-04 VITALS
OXYGEN SATURATION: 95 % | HEIGHT: 69 IN | WEIGHT: 168.4 LBS | DIASTOLIC BLOOD PRESSURE: 66 MMHG | TEMPERATURE: 97.8 F | HEART RATE: 78 BPM | SYSTOLIC BLOOD PRESSURE: 116 MMHG | BODY MASS INDEX: 24.94 KG/M2

## 2020-09-04 DIAGNOSIS — I25.110 CORONARY ARTERY DISEASE INVOLVING NATIVE CORONARY ARTERY OF NATIVE HEART WITH UNSTABLE ANGINA PECTORIS (HCC): Primary | ICD-10-CM

## 2020-09-04 DIAGNOSIS — I10 ESSENTIAL HYPERTENSION: ICD-10-CM

## 2020-09-04 DIAGNOSIS — I49.3 FREQUENT PVCS: ICD-10-CM

## 2020-09-04 DIAGNOSIS — E78.01 FAMILIAL HYPERCHOLESTEROLEMIA: ICD-10-CM

## 2020-09-04 PROCEDURE — 99214 OFFICE O/P EST MOD 30 MIN: CPT | Performed by: INTERNAL MEDICINE

## 2020-09-04 NOTE — PROGRESS NOTES
"[facility] Cardiology    Encounter Date: 2020    Patient ID: Enio Tapia is a 70 y.o. male.  : 1950     PCP: Troy Mckay MD       Chief Complaint: Coronary Artery Disease      PROBLEM LIST:  1. Coronary artery disease  a. As/P3 0.5 x 32 Taxus JOHANA mid RCA lesion 10/15/04  b. Normal stress echo 2010  c. Echo 2010 showed normal EF of 60% with trace TR and MR.  d. [facility] 3/31/17For unstable angina: Diffuse 70% stenosis of mid LAD noted.  Stented with Xience 3.0 x 33 JOHANA reducing stenosis to 0%.  Also 70% discrete stenosis in proximal first diagonal stented with Xience Alpine 2.25 x 15 JOHANA reducing stenosis to 0%.  Previous stenting proximal to mid RCA widely patent. Normal LVEF.  e. Echo 3/31/17: EF 55%.  Mild LVH noted.  f. [facility], 2020: A small-to-medium-sized infarct located in the inferior wall and septal wall with mild valarie-infarct ischemia. The test is remarkable for brief \"chest burning\" which resolved in early recovery. EF 71%.  g. [facility], 2020: EF 60%. Severe disease of the distal RCA continuing into the proximal PDA; stents reducing the 80% stenosis to 0%. Moderate disease of the proximal RCA, stented with 4.0 x 19 mm JOHANA and reduced to 0%. 80% stenosis of the PLV branch of the RCA reduced to less than 40%. Nonobstructive 40% stenosis of the proximal circumflex.   2. PVCs:  a. 24h Holter, 2018: Min HR 61, mean 70, max 108. 25,091 PVCs, mostly bigeminy.  b. PVC ablation, 2018 - 1. Successful catheter mapping ablation of 2 ventricular foci. 2. Induction of ectopy was difficult. Less than 10 ectopic beats were able to be induced over a 2 hour time period.  c. 48h Holter, 2019: 00448 PVCs, 11.8% burden  3. Hypertension  4. Hyperlipidemia  5. Hypothyroidism  6. Anxiety disorder  7. Glaucoma  8. Osteoarthritis  9. GRABIEL with CPAP compliance.  10. History of horse bite 2018 on the right side of the chest, with severe chest " trauma.    History of Present Illness  Patient presents today for a 1 month hospital follow-up with a history of CAD, abnormal nuclear stress test, frequent PVC's, and cardiac risk factors. Patient underwent a LHC on 07/16/2020. Since last visit, he has been feeling well overall from a cardiovascular standpoint. He has retired but is staying active by walking. Patient denies chest pain, shortness of breath, PND, edema, palpitations, syncope, or presyncope at this time.      Allergies   Allergen Reactions   • Pravastatin Myalgia   • Statins Myalgia     ESPECIALLY LIPITOR         Current Outpatient Medications:   •  acyclovir (ZOVIRAX) 400 MG tablet, Take 1 tablet by mouth Daily., Disp: 90 tablet, Rfl: 2  •  aspirin 81 MG EC tablet, Take 81 mg by mouth Every Night., Disp: , Rfl:   •  clopidogrel (PLAVIX) 75 MG tablet, Take 1 tablet by mouth Daily., Disp: 90 tablet, Rfl: 1  •  Evolocumab 140 MG/ML solution auto-injector, Inject 1 mL under the skin into the appropriate area as directed Every 14 (Fourteen) Days., Disp: 2 pen, Rfl: 11  •  isosorbide mononitrate (IMDUR) 60 MG 24 hr tablet, Take 1 tablet by mouth Every Morning., Disp: 90 tablet, Rfl: 3  •  levothyroxine (SYNTHROID, LEVOTHROID) 150 MCG tablet, Take 1 tablet by mouth Daily. (Patient taking differently: Take 150 mcg by mouth Every Night.), Disp: 90 tablet, Rfl: 1  •  losartan (COZAAR) 50 MG tablet, Take 1 tablet by mouth Daily. (Patient taking differently: Take 50 mg by mouth Every Night.), Disp: 90 tablet, Rfl: 3  •  Multiple Vitamins-Minerals (CENTRUM SILVER PO), Take 1 tablet by mouth Daily., Disp: , Rfl:   •  nitroglycerin (NITROSTAT) 0.4 MG SL tablet, 1 under the tongue as needed for angina, may repeat q5mins for up three doses, Disp: 100 tablet, Rfl: 11  •  sertraline (ZOLOFT) 100 MG tablet, TAKE ONE TABLET BY MOUTH DAILY (Patient taking differently: Take 100 mg by mouth Every Night.), Disp: 30 tablet, Rfl: 4  •  travoprost, BAK free, (TRAVATAN) 0.004 %  "solution ophthalmic solution, Administer 1 drop to both eyes Every Evening. in affected eye(s) , Disp: , Rfl:   •  zolpidem (Ambien) 5 MG tablet, Take 1 tablet by mouth At Night As Needed for Sleep., Disp: 30 tablet, Rfl: 0    The following portions of the patient's history were reviewed and updated as appropriate: allergies, current medications, past family history, past medical history, past social history, past surgical history and problem list.    ROS  Review of Systems   Constitution: Negative for chills, fever, fatigue, generalized weakness.   Cardiovascular: Negative for chest pain, dyspnea on exertion, leg swelling, palpitations, orthopnea, and syncope.   Respiratory: Negative for cough, shortness of breath, and wheezing.  HENT: Negative for ear pain, nosebleeds, and tinnitus.  Gastrointestinal: Negative for abdominal pain, constipation, diarrhea, nausea and vomiting.   Genitourinary: No urinary symptoms.  Musculoskeletal: Negative for muscle cramps.  Neurological: Negative for dizziness, headaches, loss of balance, numbness, and symptoms of stroke.  Psychiatric: Normal mental status.     All other systems reviewed and are negative.        Objective:     /66 (BP Location: Left arm, Patient Position: Sitting)   Pulse 78   Temp 97.8 °F (36.6 °C)   Ht 175.3 cm (69\")   Wt 76.4 kg (168 lb 6.4 oz)   SpO2 95%   BMI 24.87 kg/m²      Physical Exam  Constitutional: Patient appears well-developed and well-nourished.   HENT: HEENT exam unremarkable.   Neck: Neck supple. No JVD present. No carotid bruits.   Cardiovascular: Normal rate, regular rhythm and normal heart sounds. No murmur heard.   2+ symmetric pulses.   Pulmonary/Chest: Breath sounds normal. Does not exhibit tenderness.   Abdominal: Abdomen benign.   Musculoskeletal: Does not exhibit edema.   Neurological: Neurological exam unremarkable.   Vitals reviewed.    Data Review:   Lab Results   Component Value Date     (H) 07/20/2020    BUN 21 " 07/20/2020    CREATININE 1.06 07/20/2020    EGFRIFNONA 69 07/20/2020    EGFRIFAFRI 83 07/20/2020    BCR 19.8 07/20/2020    K 5.3 (H) 07/20/2020    CO2 26.2 07/20/2020    CALCIUM 9.3 07/20/2020    ALBUMIN 4.40 07/20/2020    AST 16 07/20/2020    ALT 24 07/20/2020     Lab Results   Component Value Date    CHLPL 126 07/20/2020    TRIG 133 07/20/2020    HDL 47 07/20/2020    LDL 52 07/20/2020      Lab Results   Component Value Date    WBC 4.90 07/20/2020    RBC 4.43 07/20/2020    HGB 14.1 07/20/2020    HCT 42.5 07/20/2020    MCV 95.9 07/20/2020     07/20/2020     Lab Results   Component Value Date    TSH 8.300 (H) 07/20/2020     Lab Results   Component Value Date    HGBA1C 5.20 07/16/2020        Procedures       Assessment:      Diagnosis Plan   1. Coronary artery disease involving native coronary artery of native heart with unstable angina pectoris (CMS/[facility])  Continue aspirin and Plavix for DAPT, Imdur 60 mg daily, and NTG as needed for chest pain.    2. Frequent PVCs  11.8% PVC's on last monitoring in June 2019   3. Essential hypertension  Well-controlled; continue losartan.    4. Familial hypercholesterolemia  Well-controlled; continue Repatha.      Plan:   Stable cardiac status.  Continue current medications.   FU in 6 MO, sooner as needed.  Thank you for allowing us to participate in the care of your patient.       Scribed for Candy Ribeiro MD by Rand Shah. 9/4/2020  10:59     I, Candy Ribeiro MD, personally performed the services described in this documentation as scribed by the above named individual in my presence, and it is both accurate and complete.  9/4/2020  11:33            Please note that portions of this note may have been completed with a voice recognition program. Efforts were made to edit the dictations, but occasionally words are mistranscribed.

## 2020-09-21 ENCOUNTER — HOSPITAL ENCOUNTER (EMERGENCY)
Facility: HOSPITAL | Age: 70
Discharge: HOME | End: 2020-09-21
Attending: EMERGENCY MEDICINE | Admitting: EMERGENCY MEDICINE

## 2020-09-21 VITALS
HEIGHT: 69 IN | HEART RATE: 75 BPM | WEIGHT: 170 LBS | BODY MASS INDEX: 25.18 KG/M2 | RESPIRATION RATE: 18 BRPM | SYSTOLIC BLOOD PRESSURE: 158 MMHG | DIASTOLIC BLOOD PRESSURE: 83 MMHG | TEMPERATURE: 97.8 F | OXYGEN SATURATION: 96 %

## 2020-09-21 DIAGNOSIS — L08.9 INFECTED ABRASION OF LEFT HAND, INITIAL ENCOUNTER: Primary | ICD-10-CM

## 2020-09-21 DIAGNOSIS — S60.512A INFECTED ABRASION OF LEFT HAND, INITIAL ENCOUNTER: Primary | ICD-10-CM

## 2020-09-21 PROBLEM — L03.114 CELLULITIS OF LEFT HAND: Status: ACTIVE | Noted: 2020-09-21

## 2020-09-21 LAB
ANION GAP SERPL CALCULATED.3IONS-SCNC: 10 MMOL/L (ref 5–15)
BASOPHILS # BLD AUTO: 0.05 10*3/MM3 (ref 0–0.2)
BASOPHILS NFR BLD AUTO: 0.8 % (ref 0–1.5)
BUN SERPL-MCNC: 20 MG/DL (ref 8–23)
BUN/CREAT SERPL: 22.5 (ref 7–25)
CALCIUM SPEC-SCNC: 9.1 MG/DL (ref 8.6–10.5)
CHLORIDE SERPL-SCNC: 102 MMOL/L (ref 98–107)
CO2 SERPL-SCNC: 23 MMOL/L (ref 22–29)
CREAT SERPL-MCNC: 0.89 MG/DL (ref 0.76–1.27)
DEPRECATED RDW RBC AUTO: 45.3 FL (ref 37–54)
EOSINOPHIL # BLD AUTO: 0.16 10*3/MM3 (ref 0–0.4)
EOSINOPHIL NFR BLD AUTO: 2.5 % (ref 0.3–6.2)
ERYTHROCYTE [DISTWIDTH] IN BLOOD BY AUTOMATED COUNT: 12.9 % (ref 12.3–15.4)
GFR SERPL CREATININE-BSD FRML MDRD: 84 ML/MIN/1.73
GLUCOSE SERPL-MCNC: 102 MG/DL (ref 65–99)
HCT VFR BLD AUTO: 42.4 % (ref 37.5–51)
HGB BLD-MCNC: 14.5 G/DL (ref 13–17.7)
IMM GRANULOCYTES # BLD AUTO: 0.03 10*3/MM3 (ref 0–0.05)
IMM GRANULOCYTES NFR BLD AUTO: 0.5 % (ref 0–0.5)
LYMPHOCYTES # BLD AUTO: 1.02 10*3/MM3 (ref 0.7–3.1)
LYMPHOCYTES NFR BLD AUTO: 16 % (ref 19.6–45.3)
MCH RBC QN AUTO: 32.7 PG (ref 26.6–33)
MCHC RBC AUTO-ENTMCNC: 34.2 G/DL (ref 31.5–35.7)
MCV RBC AUTO: 95.5 FL (ref 79–97)
MONOCYTES # BLD AUTO: 0.48 10*3/MM3 (ref 0.1–0.9)
MONOCYTES NFR BLD AUTO: 7.5 % (ref 5–12)
NEUTROPHILS NFR BLD AUTO: 4.65 10*3/MM3 (ref 1.7–7)
NEUTROPHILS NFR BLD AUTO: 72.7 % (ref 42.7–76)
NRBC BLD AUTO-RTO: 0 /100 WBC (ref 0–0.2)
PLATELET # BLD AUTO: 171 10*3/MM3 (ref 140–450)
PMV BLD AUTO: 8.9 FL (ref 6–12)
POTASSIUM SERPL-SCNC: 4.4 MMOL/L (ref 3.5–5.2)
RBC # BLD AUTO: 4.44 10*6/MM3 (ref 4.14–5.8)
SODIUM SERPL-SCNC: 135 MMOL/L (ref 136–145)
WBC # BLD AUTO: 6.39 10*3/MM3 (ref 3.4–10.8)

## 2020-09-21 PROCEDURE — 99283 EMERGENCY DEPT VISIT LOW MDM: CPT

## 2020-09-21 PROCEDURE — 96368 THER/DIAG CONCURRENT INF: CPT

## 2020-09-21 PROCEDURE — 25010000002 VANCOMYCIN 10 G RECONSTITUTED SOLUTION: Performed by: PHYSICIAN ASSISTANT

## 2020-09-21 PROCEDURE — 96365 THER/PROPH/DIAG IV INF INIT: CPT

## 2020-09-21 PROCEDURE — 80048 BASIC METABOLIC PNL TOTAL CA: CPT | Performed by: PHYSICIAN ASSISTANT

## 2020-09-21 PROCEDURE — 85025 COMPLETE CBC W/AUTO DIFF WBC: CPT | Performed by: PHYSICIAN ASSISTANT

## 2020-09-21 PROCEDURE — 25010000002 CEFTRIAXONE PER 250 MG: Performed by: PHYSICIAN ASSISTANT

## 2020-09-21 RX ORDER — CLINDAMYCIN HYDROCHLORIDE 300 MG/1
300 CAPSULE ORAL 3 TIMES DAILY
Qty: 30 CAPSULE | Refills: 0 | Status: SHIPPED | OUTPATIENT
Start: 2020-09-21 | End: 2020-11-24

## 2020-09-21 RX ADMIN — CEFTRIAXONE SODIUM 1 G: 1 INJECTION, POWDER, FOR SOLUTION INTRAMUSCULAR; INTRAVENOUS at 17:56

## 2020-09-21 RX ADMIN — VANCOMYCIN HYDROCHLORIDE 1500 MG: 10 INJECTION, POWDER, LYOPHILIZED, FOR SOLUTION INTRAVENOUS at 18:18

## 2020-09-21 NOTE — ED PROVIDER NOTES
"Subjective   70-year-old male presents the emergency department with complaints of redness and pain in the left hand.  The patient states that he was fishing a week ago and saw a deer that was stuck in the water.  He states that he was trying to help get the deer out of the water and stumbled and fell.  He thinks he may have scratched his hand on a thorn bush.  He has been treating the wound at home with peroxide and topical antibiotic ointment.  Over the last couple of days, he has developed some redness and bruising and pain to the dorsum of the left hand third and fourth MCPs.  There is also some redness on the palmar aspect in the same distribution.  There is mild blistering at the base of the third and fourth digits.  Patient is right-hand dominant.  Past medical history of CAD with stents and hyperlipidemia.           Right hand while fishing a week ago.  Thinks he may have lacerated his hand on a [place]ky bush thorn.  States that the time was \"just a scratch\".  Palmar aspect just proximal to ring finger MCP.  Treated at home with antibiotic ointment and peroxide soaks for 4 days.  Nail swelling to dorsum of hand third and fourth MCPs, third and fourth digits, now with purpuric blistering third and fourth and fifth interdigital space.  No fevers chills or sweats.      Physical Exam  GENERAL:Well developed.  Appears in no acute distress.  HENT: Nares patent  EYES: No scleral icterus  CV: Regular rhythm, regular rate  RESPIRATORY: Normal effort.  No audible wheezes, rales or rhonchi  ABDOMEN: Soft, nontender  MUSCULOSKELETAL: No deformities.  Right hand with soft tissue swelling erythema and induration to the palmar aspect overlying the third and fourth metacarpals.  There is a healing laceration to the third metacarpal palmar Aspect.  Purpuric bruising to the third and fourth and fifth interdigital webs.  Tender to palpation with decreased flexion extension.  Refill is brisk distally.  NEURO: Alert, moves all " extremities, follows commands  SKIN: Warm, dry, no rash visualized  Review of Systems   Constitutional: Negative for chills and fever.   Respiratory: Negative for cough and shortness of breath.    Cardiovascular: Negative for chest pain.   Gastrointestinal: Negative for abdominal pain, nausea and vomiting.   Musculoskeletal: Negative for arthralgias.   Skin: Positive for rash (redness to left hand as described) and wound (left hand excoriation last week).   Allergic/Immunologic: Negative for immunocompromised state.   Hematological: Negative.    Psychiatric/Behavioral: Negative.        Past Medical History:   Diagnosis Date   • Arthritis    • BPH (benign prostatic hyperplasia)    • Chest wall hematoma    • Coronary artery disease involving native coronary artery of native heart with unstable angina pectoris (CMS/HCC) 3/31/2017   • Depression    • Disease of thyroid gland    • Enlarged prostate    • Frequent PVCs    • Glaucoma    • Hematoma     right chest   • [place] (hard of hearing)    • Hyperlipidemia    • Hypertension    • GRABIEL on CPAP        Allergies   Allergen Reactions   • Pravastatin Myalgia   • Statins Myalgia     ESPECIALLY LIPITOR       Past Surgical History:   Procedure Laterality Date   • ABLATION OF DYSRHYTHMIC FOCUS     • CARDIAC CATHETERIZATION     • CARDIAC CATHETERIZATION N/A 3/31/2017    Procedure: Left Heart Cath;  Surgeon: Enio Benavidez MD;  Location:  [facility];  Service:    • CARDIAC CATHETERIZATION N/A 7/16/2020    Procedure: Left Heart Cath;  Surgeon: Candy Ribeiro MD;  Location:  [facility];  Service: Cardiology;  Laterality: N/A;   • CARDIAC ELECTROPHYSIOLOGY PROCEDURE N/A 7/9/2018    Procedure: Ablation PVC;  Surgeon: Brandon Parker MD;  Location:  [facility];  Service: Cardiovascular   • CATARACT EXTRACTION Bilateral    • COLONOSCOPY     • CORONARY STENT PLACEMENT  10/16/2004, 2017   • INCISION AND DRAINAGE HEMATOMA  06/08/2018     Chest Wall-right side    • INCISION AND DRAINAGE LEG Right 6/8/2018    Procedure: INCISION AND DRAINAGE RIGHT TRUNK HEMATOMA;  Surgeon: Gerardo Gutierrez MD;  Location: Replaced by [facility];  Service: Cardiothoracic   • TONSILLECTOMY     • VASECTOMY  08/1998       Family History   Problem Relation Age of Onset   • Heart disease Mother    • Heart failure Mother    • Pneumonia Father        Social History     Socioeconomic History   • Marital status:      Spouse name: Not on file   • Number of children: 0   • Years of education: Not on file   • Highest education level: Not on file   Occupational History   • Occupation:    Tobacco Use   • Smoking status: Never Smoker   • Smokeless tobacco: Former User     Types: Chew, Snuff   Substance and Sexual Activity   • Alcohol use: Yes     Alcohol/week: 1.0 standard drinks     Types: 1 Cans of beer per week     Comment: occas   • Drug use: No   • Sexual activity: Defer   Social History Narrative    The patient lives with his wife in [place].           Objective   Physical Exam  Constitutional:       General: He is not in acute distress.  HENT:      Head: Normocephalic.      Nose: Nose normal.      Mouth/Throat:      Mouth: Mucous membranes are moist.   Eyes:      Pupils: Pupils are equal, round, and reactive to light.   Neck:      Musculoskeletal: Normal range of motion.   Cardiovascular:      Rate and Rhythm: Normal rate.      Pulses: Normal pulses.   Pulmonary:      Effort: Pulmonary effort is normal.   Musculoskeletal:      Comments: Normal flexion and extension of the digits of the left hand without apparent pain.  Tiny well-healing excoriation to the palmar aspect of the left hand between the third and fourth digits.  There is moderate erythema to the dorsal and palmar surfaces of the left hand at the base of the third and fourth digits.  No palpable foreign body.   Skin:     General: Skin is warm and dry.   Neurological:      General: No focal deficit present.       Mental Status: He is alert.   Psychiatric:         Mood and Affect: Mood normal.         Procedures           ED Course      The patient appears to have a localized infection of the left hand.  He has good range of motion without significant pain.  He had an x-ray at an outpatient facility today that showed no evidence of foreign body.  The patient be given a dose of IV antibiotics and discharged home to continue oral antibiotics and to follow-up with a hand specialist if his symptoms persist or worsen.                                     MDM    Final diagnoses:   Infected abrasion of left hand, initial encounter            Nick Alvarez PA  09/21/20 1800

## 2020-09-21 NOTE — DISCHARGE INSTRUCTIONS
Clindamycin as prescribed.  Call for follow up at [facility] this week.  Return to the ER if acutely worse.

## 2020-09-28 ENCOUNTER — TELEPHONE (OUTPATIENT)
Dept: FAMILY MEDICINE CLINIC | Facility: CLINIC | Age: 70
End: 2020-09-28

## 2020-09-28 NOTE — TELEPHONE ENCOUNTER
PATIENT IS REQUESTING A REFILL FOR NEPROXIN 500MG. PATIENT IS CURRENTLY OUT OF THE MEDICATION.    CALLBACK NUMBER IS   [phone]    [address] [phone] [facility] [phone]

## 2020-09-29 NOTE — TELEPHONE ENCOUNTER
Patient wife advised Naproxen not good to take with the Plavix and ASA, advised to try only tylenol. Advised if this needs further addressed to RTC to be seen and discuss.

## 2020-10-13 RX ORDER — EVOLOCUMAB 140 MG/ML
INJECTION, SOLUTION SUBCUTANEOUS
Qty: 2 ML | Refills: 11 | Status: SHIPPED | OUTPATIENT
Start: 2020-10-13 | End: 2021-10-22

## 2020-10-14 ENCOUNTER — CLINICAL SUPPORT (OUTPATIENT)
Dept: FAMILY MEDICINE CLINIC | Facility: CLINIC | Age: 70
End: 2020-10-14

## 2020-10-14 DIAGNOSIS — Z23 NEED FOR INFLUENZA VACCINATION: ICD-10-CM

## 2020-10-14 PROCEDURE — 90694 VACC AIIV4 NO PRSRV 0.5ML IM: CPT | Performed by: FAMILY MEDICINE

## 2020-10-14 PROCEDURE — G0008 ADMIN INFLUENZA VIRUS VAC: HCPCS | Performed by: FAMILY MEDICINE

## 2020-11-11 RX ORDER — LEVOTHYROXINE SODIUM 0.15 MG/1
TABLET ORAL
Qty: 90 TABLET | Refills: 0 | Status: SHIPPED | OUTPATIENT
Start: 2020-11-11 | End: 2021-02-02 | Stop reason: SDUPTHER

## 2020-11-17 RX ORDER — SERTRALINE HYDROCHLORIDE 100 MG/1
TABLET, FILM COATED ORAL
Qty: 30 TABLET | Refills: 0 | Status: SHIPPED | OUTPATIENT
Start: 2020-11-17 | End: 2020-12-14

## 2020-11-23 RX ORDER — CLOPIDOGREL BISULFATE 75 MG/1
TABLET ORAL
Qty: 30 TABLET | Refills: 11 | Status: SHIPPED | OUTPATIENT
Start: 2020-11-23 | End: 2021-03-05 | Stop reason: SDUPTHER

## 2020-11-24 ENCOUNTER — OFFICE VISIT (OUTPATIENT)
Dept: CARDIOLOGY | Facility: CLINIC | Age: 70
End: 2020-11-24

## 2020-11-24 VITALS
BODY MASS INDEX: 26.07 KG/M2 | OXYGEN SATURATION: 96 % | SYSTOLIC BLOOD PRESSURE: 110 MMHG | TEMPERATURE: 97.8 F | DIASTOLIC BLOOD PRESSURE: 64 MMHG | WEIGHT: 176 LBS | HEIGHT: 69 IN | HEART RATE: 79 BPM

## 2020-11-24 DIAGNOSIS — I49.3 FREQUENT PVCS: Primary | ICD-10-CM

## 2020-11-24 DIAGNOSIS — I25.110 CORONARY ARTERY DISEASE INVOLVING NATIVE CORONARY ARTERY OF NATIVE HEART WITH UNSTABLE ANGINA PECTORIS (HCC): ICD-10-CM

## 2020-11-24 PROCEDURE — 99214 OFFICE O/P EST MOD 30 MIN: CPT | Performed by: INTERNAL MEDICINE

## 2020-11-24 NOTE — PROGRESS NOTES
"    Enio Tapia  1950  PCP: Troy Mckay MD    SUBJECTIVE:   Enio Tapia is a 70 y.o. male seen for a follow up visit regarding the following:     Chief Complaint: Follow up for PVCs, CAD,     HPI:    Patient presents today for follow-up of his PVCs. He denies feeling any recurrent palpitations. Since he was last seen, he received multiple PCI to the RCA. He notes since his intervention that he has had a significant increase in energy and no further JAMES. No exertional chest pain/pressure, edema.     PROBLEM LIST:  1. Coronary artery disease  a. As/P3 0.5 x 32 Taxus JOHANA mid RCA lesion 10/15/04  b. Normal stress echo Feb 2010  c. Echo February 2010 showed normal EF of 60% with trace TR and MR.  [facility]. [facility] 3/31/17For unstable angina: Diffuse 70% stenosis of mid LAD noted.  Stented with Xience 3.0 x 33 JOHANA reducing stenosis to 0%.  Also 70% discrete stenosis in proximal first diagonal stented with Xience Alpine 2.25 x 15 JOHANA reducing stenosis to 0%.  Previous stenting proximal to mid RCA widely patent. Normal LVEF.  e. Echo 3/31/17: EF 55%.  Mild LVH noted.  f. [facility], 06/24/2020: A small-to-medium-sized infarct located in the inferior wall and septal wall with mild valarie-infarct ischemia. The test is remarkable for brief \"chest burning\" which resolved in early recovery. EF 71%.  g. [facility], 07/16/2020: EF 60%. Severe disease of the distal RCA continuing into the proximal PDA; stents reducing the 80% stenosis to 0%. Moderate disease of the proximal RCA, stented with 4.0 x 19 mm JOHANA and reduced to 0%. 80% stenosis of the PLV branch of the RCA reduced to less than 40%. Nonobstructive 40% stenosis of the proximal circumflex.   2. PVCs:  a. 24h Holter, 05/2018: Min HR 61, mean 70, max 108. 25,091 PVCs, mostly bigeminy.  b. PVC ablation, 06/2018 - 1. Successful catheter mapping ablation of 2 ventricular foci. 2. Induction of ectopy was difficult. Less than 10 ectopic beats were able to be induced over a 2 hour time " period.  c. 48h Holter, 06/2019: 24368 PVCs, 11.8% burden  3. Hypertension  4. Hyperlipidemia  5. Hypothyroidism  6. Anxiety disorder  7. Glaucoma  8. Osteoarthritis  9. GRABIEL with CPAP compliance.  10. History of horse bite 05/19/2018 on the right side of the chest, with severe chest trauma.      Current Outpatient Medications:   •  acyclovir (ZOVIRAX) 400 MG tablet, Take 1 tablet by mouth Daily., Disp: 90 tablet, Rfl: 2  •  aspirin 81 MG EC tablet, Take 81 mg by mouth Every Night., Disp: , Rfl:   •  clopidogrel (PLAVIX) 75 MG tablet, TAKE ONE TABLET BY MOUTH DAILY, Disp: 30 tablet, Rfl: 11  •  isosorbide mononitrate (IMDUR) 60 MG 24 hr tablet, Take 1 tablet by mouth Every Morning., Disp: 90 tablet, Rfl: 3  •  levothyroxine (SYNTHROID, LEVOTHROID) 150 MCG tablet, TAKE ONE TABLET BY MOUTH DAILY, Disp: 90 tablet, Rfl: 0  •  losartan (COZAAR) 50 MG tablet, Take 1 tablet by mouth Daily. (Patient taking differently: Take 50 mg by mouth Every Night.), Disp: 90 tablet, Rfl: 3  •  Multiple Vitamins-Minerals (CENTRUM SILVER PO), Take 1 tablet by mouth Daily., Disp: , Rfl:   •  nitroglycerin (NITROSTAT) 0.4 MG SL tablet, 1 under the tongue as needed for angina, may repeat q5mins for up three doses, Disp: 100 tablet, Rfl: 11  •  Repatha SureClick 140 MG/ML solution auto-injector, INJECT 140MG(1ML) UNDER THE SKIN EVERY 14 DAYS, Disp: 2 mL, Rfl: 11  •  sertraline (ZOLOFT) 100 MG tablet, TAKE ONE TABLET BY MOUTH DAILY, Disp: 30 tablet, Rfl: 0  •  travoprost, BAK free, (TRAVATAN) 0.004 % solution ophthalmic solution, Administer 1 drop to both eyes Every Evening. in affected eye(s) , Disp: , Rfl:     Past Medical History, Past Surgical History, Family history, Social History, and Medications were all reviewed with the patient today and updated as necessary.       Patient Active Problem List   Diagnosis   • Hyperlipidemia   • Hypertension   • Acquired hypothyroidism   • Irritable bowel syndrome   • Sleep apnea   • Generalized anxiety  disorder   • Inguinal hernia   • Herpes simplex infection   • Coronary artery disease involving native coronary artery of native heart with unstable angina pectoris (CMS/HCC)   • Hematoma of right chest wall   • Frequent unifocal PVCs   • Frequent PVCs   • Rib contusion, left, initial encounter   • Stable angina pectoris (CMS/HCC)   • Abnormal stress test   • Unstable angina (CMS/HCC)   • Cellulitis of left hand     Allergies   Allergen Reactions   • Pravastatin Myalgia   • Statins Myalgia     ESPECIALLY LIPITOR     Past Medical History:   Diagnosis Date   • Arthritis    • BPH (benign prostatic hyperplasia)    • Chest wall hematoma    • Coronary artery disease involving native coronary artery of native heart with unstable angina pectoris (CMS/HCC) 3/31/2017   • Depression    • Disease of thyroid gland    • Enlarged prostate    • Frequent PVCs    • Glaucoma    • Hematoma     right chest   • [place] (hard of hearing)    • Hyperlipidemia    • Hypertension    • GRABIEL on CPAP      Past Surgical History:   Procedure Laterality Date   • ABLATION OF DYSRHYTHMIC FOCUS     • CARDIAC CATHETERIZATION     • CARDIAC CATHETERIZATION N/A 3/31/2017    Procedure: Left Heart Cath;  Surgeon: Enio Benavidez MD;  [facility];  Service:    • CARDIAC CATHETERIZATION N/A 7/16/2020    Procedure: Left Heart Cath;  Surgeon: Candy Ribeiro MD;  [facility]:  [facility];  Service: Cardiology;  Laterality: N/A;   • CARDIAC ELECTROPHYSIOLOGY PROCEDURE N/A 7/9/2018    Procedure: Ablation PVC;  Surgeon: Brandon Parker MD;  [facility]:  [facility];  Service: Cardiovascular   • CATARACT EXTRACTION Bilateral    • COLONOSCOPY     • CORONARY STENT PLACEMENT  10/16/2004, 2017   • INCISION AND DRAINAGE HEMATOMA  06/08/2018    Chest Wall-right side    • INCISION AND DRAINAGE LEG Right 6/8/2018    Procedure: INCISION AND DRAINAGE RIGHT TRUNK HEMATOMA;  Surgeon: Gerardo Gutierrez MD;  Location: [facility];   "Service: Cardiothoracic   • TONSILLECTOMY     • VASECTOMY  08/1998     Family History   Problem Relation Age of Onset   • Heart disease Mother    • Heart failure Mother    • Pneumonia Father      Social History     Tobacco Use   • Smoking status: Never Smoker   • Smokeless tobacco: Former User     Types: Chew, Snuff   Substance Use Topics   • Alcohol use: Yes     Alcohol/week: 1.0 standard drinks     Types: 1 Cans of beer per week     Comment: occas         PHYSICAL EXAM:    /64 (BP Location: Left arm, Patient Position: Sitting)   Pulse 79   Temp 97.8 °F (36.6 °C)   Ht 175.3 cm (69\")   Wt 79.8 kg (176 lb)   SpO2 96%   BMI 25.99 kg/m²        Wt Readings from Last 5 Encounters:   11/24/20 79.8 kg (176 lb)   09/21/20 77.1 kg (170 lb)   09/21/20 77.1 kg (170 lb)   09/04/20 76.4 kg (168 lb 6.4 oz)   07/20/20 77.6 kg (171 lb)       BP Readings from Last 5 Encounters:   11/24/20 110/64   09/21/20 158/83   09/21/20 142/78   09/04/20 116/66   07/20/20 134/70       General-Well Nourished, Well developed  Eyes - PERRLA  Neck- supple, No mass  CV- regular rate and rhythm, no MRG, No edema  Lung- clear bilaterally  Abd- soft, +BS  Musc/skel - Norm strength and range of motion  Skin- warm and dry  Neuro - Alert & Oriented x 3, appropriate mood.        Medical problems and test results were reviewed with the patient today.     No results found for this or any previous visit (from the past 672 hour(s)).      EKG: (EKG has been independently visualized by me and summarized below)    Procedures     ASSESSMENT and PLAN    1.  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  We discussed with him therapy options, because of his extensive coronary artery disease, bradycardia and lung disease antiarrhythmic options are limited.  He underwent catheter-based ablation 7/2018.  The ectopy had greatly been suppressed with sedation therefore paced mapping was " used for ablation, but was technically diffifult . He was loaded with Amio post ablation and has been tapered off. Repeat Holter June 2019 w/ 11.6% PVCs. Will place 24 hour holter to assess burden. If it ectopy returns consider repeat EP study with ablation vs Tikosyn loading if ectopy returns  2.  Bradycardia- sinus node function has improved post ablation of the ventricular ectopy  3.  Chest Pain/CAD - LHC 7/2020 w/ PTCA/stenting of the distal RCA into the PDA along with balloon PTCA of the PLV branch of the RCA and stenting of the proximal RCA. No further anginal symptoms.       Return in about 6 months (around 5/24/2021).    24 hour holter     Keisha Hinton PA-C   Cardiology/Electrophysiology  11/24/2020  13:21 EST

## 2020-12-14 RX ORDER — SERTRALINE HYDROCHLORIDE 100 MG/1
TABLET, FILM COATED ORAL
Qty: 30 TABLET | Refills: 0 | Status: SHIPPED | OUTPATIENT
Start: 2020-12-14 | End: 2021-01-12

## 2020-12-22 ENCOUNTER — HOSPITAL ENCOUNTER (EMERGENCY)
Facility: HOSPITAL | Age: 70
Discharge: HOME | End: 2020-12-22
Attending: EMERGENCY MEDICINE | Admitting: EMERGENCY MEDICINE

## 2020-12-22 ENCOUNTER — APPOINTMENT (OUTPATIENT)
Dept: CT IMAGING | Facility: HOSPITAL | Age: 70
End: 2020-12-22

## 2020-12-22 VITALS
TEMPERATURE: 97.8 F | SYSTOLIC BLOOD PRESSURE: 152 MMHG | WEIGHT: 170 LBS | OXYGEN SATURATION: 93 % | HEART RATE: 83 BPM | RESPIRATION RATE: 16 BRPM | BODY MASS INDEX: 25.18 KG/M2 | DIASTOLIC BLOOD PRESSURE: 97 MMHG | HEIGHT: 69 IN

## 2020-12-22 DIAGNOSIS — S09.90XA TRAUMATIC INJURY OF HEAD, INITIAL ENCOUNTER: ICD-10-CM

## 2020-12-22 DIAGNOSIS — S01.81XA FACIAL LACERATION, INITIAL ENCOUNTER: Primary | ICD-10-CM

## 2020-12-22 DIAGNOSIS — H11.31 SUBCONJUNCTIVAL HEMORRHAGE OF RIGHT EYE: ICD-10-CM

## 2020-12-22 LAB
HOLD SPECIMEN: NORMAL
HOLD SPECIMEN: NORMAL
WHOLE BLOOD HOLD SPECIMEN: NORMAL
WHOLE BLOOD HOLD SPECIMEN: NORMAL

## 2020-12-22 PROCEDURE — 70450 CT HEAD/BRAIN W/O DYE: CPT

## 2020-12-22 PROCEDURE — 99283 EMERGENCY DEPT VISIT LOW MDM: CPT

## 2020-12-22 RX ORDER — LIDOCAINE HYDROCHLORIDE AND EPINEPHRINE 10; 10 MG/ML; UG/ML
10 INJECTION, SOLUTION INFILTRATION; PERINEURAL ONCE
Status: COMPLETED | OUTPATIENT
Start: 2020-12-22 | End: 2020-12-22

## 2020-12-22 RX ORDER — SODIUM CHLORIDE 0.9 % (FLUSH) 0.9 %
10 SYRINGE (ML) INJECTION AS NEEDED
Status: DISCONTINUED | OUTPATIENT
Start: 2020-12-22 | End: 2020-12-22 | Stop reason: HOSPADM

## 2020-12-22 RX ADMIN — Medication 3 ML: at 18:50

## 2020-12-22 RX ADMIN — LIDOCAINE HYDROCHLORIDE,EPINEPHRINE BITARTRATE 10 ML: 10; .01 INJECTION, SOLUTION INFILTRATION; PERINEURAL at 18:53

## 2020-12-23 NOTE — ED PROVIDER NOTES
EMERGENCY DEPARTMENT ENCOUNTER    Room Number:  15/15  Date of encounter:  12/22/2020  PCP: Troy Mckay MD  Historian: Patient      HPI:  Chief Complaint: Head injury and facial laceration        Context: Enio Tapia is a 70 y.o. male who presents to the ED c/o injuries sustained and an accident at his home.  The patient was moving a trapdoor from an attic when a spring broke and a piece of metal struck him right above his right eye.  He had no loss of consciousness.  He denies midline neck and back pain.  He reports his discomfort is 4-5 out of 10 on the pain scale we denies taking any pain medications prior to arrival.  He declines pain medications here.  He denies  Vision changes including double vision.  He denies dizziness, nausea/vomiting.  Tetanus status was updated within the last 5 years    PAST MEDICAL HISTORY  Active Ambulatory Problems     Diagnosis Date Noted   • Hyperlipidemia 06/10/2016   • Hypertension 06/10/2016   • Acquired hypothyroidism 06/10/2016   • Irritable bowel syndrome 06/10/2016   • Sleep apnea 06/10/2016   • Generalized anxiety disorder 06/10/2016   • Inguinal hernia 06/10/2016   • Herpes simplex infection 02/24/2017   • Coronary artery disease involving native coronary artery of native heart with unstable angina pectoris (CMS/HCC) 03/31/2017   • Hematoma of right chest wall 05/21/2018   • Frequent unifocal PVCs 05/25/2018   • Frequent PVCs 07/09/2018   • Rib contusion, left, initial encounter 03/09/2019   • Stable angina pectoris (CMS/HCC) 05/20/2020   • Abnormal stress test 07/13/2020   • Unstable angina (CMS/HCC) 07/13/2020   • Cellulitis of left hand 09/21/2020     Resolved Ambulatory Problems     Diagnosis Date Noted   • Bradycardia 09/18/2018     Past Medical History:   Diagnosis Date   • Arthritis    • BPH (benign prostatic hyperplasia)    • Chest wall hematoma    • Depression    • Disease of thyroid gland    • Enlarged prostate    • Glaucoma    • Hematoma    • [place]  (hard of hearing)    • GRABIEL on CPAP          PAST SURGICAL HISTORY  Past Surgical History:   Procedure Laterality Date   • ABLATION OF DYSRHYTHMIC FOCUS     • CARDIAC CATHETERIZATION     • CARDIAC CATHETERIZATION N/A 3/31/2017    Procedure: Left Heart Cath;  Surgeon: Enio Benaivdez MD;  [facility];  Service:    • CARDIAC CATHETERIZATION N/A 7/16/2020    Procedure: Left Heart Cath;  Surgeon: Candy Ribeiro MD;  [facility];  Service: Cardiology;  Laterality: N/A;   • CARDIAC ELECTROPHYSIOLOGY PROCEDURE N/A 7/9/2018    Procedure: Ablation PVC;  Surgeon: Brandon Parker MD;  [facility]:  [facility];  Service: Cardiovascular   • CATARACT EXTRACTION Bilateral    • COLONOSCOPY     • CORONARY STENT PLACEMENT  10/16/2004, 2017   • INCISION AND DRAINAGE HEMATOMA  06/08/2018    Chest Wall-right side    • INCISION AND DRAINAGE LEG Right 6/8/2018    Procedure: INCISION AND DRAINAGE RIGHT TRUNK HEMATOMA;  Surgeon: Gerardo Gutierrez MD;  Location:  [facility];  Service: Cardiothoracic   • TONSILLECTOMY     • VASECTOMY  08/1998         FAMILY HISTORY  Family History   Problem Relation Age of Onset   • Heart disease Mother    • Heart failure Mother    • Pneumonia Father          SOCIAL HISTORY  Social History     Socioeconomic History   • Marital status:      Spouse name: Not on file   • Number of children: 0   • Years of education: Not on file   • Highest education level: Not on file   Occupational History   • Occupation:    Tobacco Use   • Smoking status: Never Smoker   • Smokeless tobacco: Former User     Types: Chew, Snuff   Substance and Sexual Activity   • Alcohol use: Yes     Alcohol/week: 1.0 standard drinks     Types: 1 Cans of beer per week     Comment: occas   • Drug use: No   • Sexual activity: Defer   Social History Narrative    The patient lives with his wife in [place].         ALLERGIES  Pravastatin and Statins        REVIEW  OF SYSTEMS  Review of Systems     All systems reviewed and negative except for those discussed in HPI.       PHYSICAL EXAM    I have reviewed the triage vital signs and nursing notes.    ED Triage Vitals [12/22/20 1733]   Temp Heart Rate Resp BP SpO2   97.8 °F (36.6 °C) 80 18 (!) 164/105 93 %      Temp src Heart Rate Source Patient Position BP Location FiO2 (%)   Infrared Monitor Sitting Left arm --       Physical Exam  GENERAL:   Appears uncomfortable but nontoxic.  Bandaging is covering his right eye and forehead..  HENT: Nares patent.  Stable craniofacial bones.  EYES: No scleral icterus.  Subconjunctival hemorrhage right lateral eye.  No evidence of corneal injury.  No foreign bodies.  Extraocular movements intact without diplopia.  CV: Regular rhythm, regular rate.  2+ radial pulses  RESPIRATORY: Normal effort.  No audible wheezes, rales or rhonchi.  MUSCULOSKELETAL: No deformities.  No midline cervical tenderness  NEURO: Alert, moves all extremities, follows commands.  SKIN: 4 cm stellate laceration above right eye extending into the eyebrow.  This gapes widely and has a steady nonpulsatile flow of blood when no pressure is being applied.        LAB RESULTS  Recent Results (from the past 24 hour(s))   Light Blue Top    Collection Time: 12/22/20  5:41 PM   Result Value Ref Range    Extra Tube hold for add-on    Green Top (Gel)    Collection Time: 12/22/20  5:41 PM   Result Value Ref Range    Extra Tube Hold for add-ons.    Lavender Top    Collection Time: 12/22/20  5:41 PM   Result Value Ref Range    Extra Tube hold for add-on    Gold Top - SST    Collection Time: 12/22/20  5:41 PM   Result Value Ref Range    Extra Tube Hold for add-ons.        If labs were ordered, I independently reviewed the results.        RADIOLOGY  Ct Head Without Contrast    Result Date: 12/22/2020  EXAMINATION: CT HEAD WO CONTRAST-  INDICATION: Head trauma, scalp hematoma (Ped 0-1y)  TECHNIQUE: CT head without intravenous contrast  The  radiation dose reduction device was turned on for each scan per the ALARA (As Low as Reasonably Achievable) protocol.  COMPARISON: NONE  FINDINGS: No acute intra-axial major extra-axial fluid collection however asymmetry in the left frontoparietal region for example series 3 image 25 there is asymmetry of cortical thickness without distinct sulcal effacement likely a congenital variant of gyral type cortical dysplasia is a consideration without midline shift or hydrocephalus. Globes and orbits unremarkable. Paranasal sinuses and mastoid cells are grossly clear and well-pneumatized exception of a moderate left mastoid effusion. Calvarium intact. Small soft tissue ulceration overlying the supraorbital region right.        1. Small scalp hematoma and soft tissue laceration overlying the right supraorbital region with underlying intact calvarium. 2. Although no acute intracranial findings of trauma identified specifically no acute hemorrhage there is asymmetry of cortical thickness is been despite technical differences in obliquity left frontotemporal region as detailed above concerning for potential congenital variants of anatomy such as gyral type cortical dysplasia consideration for MRI and/or urology consultation when clinically appropriate as this does not appear acute.         I ordered and reviewed the above noted radiographic studies.    I viewed images of head CT which showed no intracranial hemorrhage per my independent interpretation.  See radiologist's dictation for official interpretation.        PROCEDURES    Laceration Repair    Date/Time: 12/22/2020 8:50 PM  Performed by: Cody Graf MD  Authorized by: Cody Graf MD     Consent:     Consent obtained:  Verbal    Consent given by:  Patient    Risks discussed:  Infection  Anesthesia (see MAR for exact dosages):     Anesthesia method:  Topical application and local infiltration    Topical anesthetic:  LET    Local anesthetic:  Lidocaine 1% WITH  epi  Laceration details:     Location:  Face    Face location:  R eyebrow    Length (cm):  5  Repair type:     Repair type:  Simple  Exploration:     Hemostasis achieved with:  LET and epinephrine    Wound exploration: wound explored through full range of motion and entire depth of wound probed and visualized    Treatment:     Area cleansed with:  Betadine    Amount of cleaning:  Extensive    Irrigation solution:  Sterile saline    Irrigation method:  Syringe  Skin repair:     Repair method:  Sutures    Suture size:  3-0    Suture material:  Nylon    Number of sutures:  7  Approximation:     Approximation:  Close  Post-procedure details:     Dressing:  Sterile dressing        No orders to display       MEDICATIONS GIVEN IN ER    Medications   sodium chloride 0.9 % flush 10 mL (has no administration in time range)   lidocaine-epinephrine-tetracaine (LET) topical gel 3 mL (3 mL Topical Given 12/22/20 1850)   lidocaine-EPINEPHrine (XYLOCAINE W/EPI) 1 %-1:[id number] injection 10 mL (10 mL Injection Given by Other 12/22/20 1853)         PROGRESS, DATA ANALYSIS, CONSULTS, AND MEDICAL DECISION MAKING    All labs have been independently reviewed by me.  All radiology studies have been reviewed by me and the radiologist dictating the report.   EKG's have been independently viewed and interpreted by me.                   AS OF 20:52 EST VITALS:    BP - (!) 164/105  HR - 80  TEMP - 97.8 °F (36.6 °C) (Infrared)  O2 SATS - 93%        DIAGNOSIS  Final diagnoses:   Facial laceration, initial encounter   Traumatic injury of head, initial encounter   Subconjunctival hemorrhage of right eye         DISPOSITION  DISCHARGE    FOLLOW-UP  Troy Mckay MD  [address] [place] [address] [place] [id number]  [phone]      As needed    [facility] Emergency Department  [address] [address] [phone]    IF YOU HAVE ANY CONCERN OF WORSENING CONDITION         Medication List       Changed    losartan 50 MG tablet  Commonly known as: COZAAR  Take 1 tablet by mouth Daily.  What changed: when to take this                     Cody Graf MD  12/22/20 2052

## 2020-12-23 NOTE — DISCHARGE INSTRUCTIONS
Suture removal on day 6, Monday morning.  I recommend coming at 7:15 AM which is the best time to arrive in any emergency department.    If you develop signs of infection such as spreading redness, warmth, pain-please return to the emergency department.    Sleep with your head somewhat elevated to reduce pressure on your lacerations.    Follow-up with your ophthalmologist if you have any changes in vision.    Please review the medications you are supposed to be taking according to prior physician recommendations. I have not changed your home medications during this visit. If your discharge instructions indicate that I have changed your home medications, this is not the case, and you should disregard. If you have any questions about the medication you should be taking at home, please call your physician.

## 2020-12-28 ENCOUNTER — HOSPITAL ENCOUNTER (EMERGENCY)
Facility: HOSPITAL | Age: 70
Discharge: HOME | End: 2020-12-28
Attending: EMERGENCY MEDICINE | Admitting: EMERGENCY MEDICINE

## 2020-12-28 VITALS
WEIGHT: 170 LBS | HEART RATE: 73 BPM | OXYGEN SATURATION: 95 % | DIASTOLIC BLOOD PRESSURE: 91 MMHG | SYSTOLIC BLOOD PRESSURE: 143 MMHG | HEIGHT: 69 IN | BODY MASS INDEX: 25.18 KG/M2 | TEMPERATURE: 97.5 F | RESPIRATION RATE: 17 BRPM

## 2020-12-28 DIAGNOSIS — Z48.02 VISIT FOR SUTURE REMOVAL: Primary | ICD-10-CM

## 2020-12-28 PROCEDURE — 99201: CPT

## 2020-12-28 NOTE — DISCHARGE INSTRUCTIONS
Keep site clean, dry.  Allow steristrips to stay on until they fall off on their own.  Watch for infection and return if any concerns.

## 2020-12-28 NOTE — ED PROVIDER NOTES
Subjective   70-year-old male returns for stitch removal from right supraorbital region.  The laceration occurred as a result of a spring that hit him in the face.  The sutures were placed here 6 days ago.  He denies any pain.  No infection.  No visual problems.  I was asked by the RN to take a look at the stitches to make sure that they were ready to come out.  No other complaints.          Review of Systems   Eyes: Negative for visual disturbance.   Skin: Positive for wound (Right supraorbital sutured laceration).   Neurological: Negative for dizziness and headaches.       Past Medical History:   Diagnosis Date   • Arthritis    • BPH (benign prostatic hyperplasia)    • Chest wall hematoma    • Coronary artery disease involving native coronary artery of native heart with unstable angina pectoris (CMS/HCC) 3/31/2017   • Depression    • Disease of thyroid gland    • Enlarged prostate    • Frequent PVCs    • Glaucoma    • Hematoma     right chest   • [place] (hard of hearing)    • Hyperlipidemia    • Hypertension    • GRABIEL on CPAP        Allergies   Allergen Reactions   • Pravastatin Myalgia   • Statins Myalgia     ESPECIALLY LIPITOR       Past Surgical History:   Procedure Laterality Date   • ABLATION OF DYSRHYTHMIC FOCUS     • CARDIAC CATHETERIZATION     • CARDIAC CATHETERIZATION N/A 3/31/2017    Procedure: Left Heart Cath;  Surgeon: Enio Benavidez MD;  [facility];  Service:    • CARDIAC CATHETERIZATION N/A 7/16/2020    Procedure: Left Heart Cath;  Surgeon: Candy Ribeiro MD;  [facility];  Service: Cardiology;  Laterality: N/A;   • CARDIAC ELECTROPHYSIOLOGY PROCEDURE N/A 7/9/2018    Procedure: Ablation PVC;  Surgeon: Brandon Parker MD;  [facility];  Service: Cardiovascular   • CATARACT EXTRACTION Bilateral    • COLONOSCOPY     • CORONARY STENT PLACEMENT  10/16/2004, 2017   • INCISION AND DRAINAGE HEMATOMA  06/08/2018    Chest  Wall-right side    • INCISION AND DRAINAGE LEG Right 6/8/2018    Procedure: INCISION AND DRAINAGE RIGHT TRUNK HEMATOMA;  Surgeon: Gerardo Gutierrez MD;  Location: [facility];  Service: Cardiothoracic   • TONSILLECTOMY     • VASECTOMY  08/1998       Family History   Problem Relation Age of Onset   • Heart disease Mother    • Heart failure Mother    • Pneumonia Father        Social History     Socioeconomic History   • Marital status:      Spouse name: Not on file   • Number of children: 0   • Years of education: Not on file   • Highest education level: Not on file   Occupational History   • Occupation:    Tobacco Use   • Smoking status: Never Smoker   • Smokeless tobacco: Former User     Types: Chew, Snuff   Substance and Sexual Activity   • Alcohol use: Yes     Alcohol/week: 1.0 standard drinks     Types: 1 Cans of beer per week     Comment: occas   • Drug use: No   • Sexual activity: Defer   Social History Narrative    The patient lives with his wife in [place].           Objective   Physical Exam  Constitutional:       General: He is not in acute distress.  HENT:      Head:      Comments: 2.5 cm irregular shaped laceration over the right supraorbital region.  Scabbing is present over the sutures.  Mild redness.  No drainage or apparent infection.     Nose: Nose normal.      Mouth/Throat:      Mouth: Mucous membranes are moist.   Eyes:      Conjunctiva/sclera: Conjunctivae normal.      Pupils: Pupils are equal, round, and reactive to light.   Neck:      Musculoskeletal: Normal range of motion.   Pulmonary:      Effort: Pulmonary effort is normal.   Skin:     Comments: 2.5 cm irregularly shaped laceration over right brow.  No drainage.  No apparent infection.  There is scabbing covering the sutures.     Neurological:      Mental Status: He is alert and oriented to person, place, and time.   Psychiatric:         Mood and Affect: Mood normal.         Procedures           ED Course      The RN  asked me to evaluate whether the sutures were ready to be removed or not.  The wound appears well healing but has considerable scabbing.  The scabs were gently picked away by the RN and revealed well healing sutured laceration.  I felt the sutures could be removed and then steristrips to be applied to help hold the wound while it finished healing.  After the RN removed the sutures, the wound did open a bit but does not gape.  Steristrips were applied and the pt was advised to keep the steristrips on until they fall off on their own.  He was advised to return if any signs of infection.                                     MDM    Final diagnoses:   Visit for suture removal            Nick Alvarez, PA  12/28/20 0746

## 2020-12-30 ENCOUNTER — PATIENT OUTREACH (OUTPATIENT)
Dept: CASE MANAGEMENT | Facility: OTHER | Age: 70
End: 2020-12-30

## 2020-12-30 NOTE — OUTREACH NOTE
"Patient Outreach Note    Attempted to contact pt regarding ED visit 12/22/20 and 12/28/2020 with chief c/o head laceration and then returned to have sutures removed.  Wife, Judit (on [place]), answered phone and states he is unavailable.  States \"he is doing fine.  Still black eye, but suture site looks good.  No drainage.\"    Also, denies that he is having any fever, chills, and no areas of redness at suture line. He is up and about and is self sufficient and is compliant with medicines and medical appts.  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  She states since senior living he has not being as active as he should, but is thinking about joining [employer] (silver sneakers through [employer]) for 2021. States they both try to eat healthy diet.  Role of  explained and contact number given.  [facility] Nurse line explained and contact number provided.  [employer]  number provided and explained. He is not on MyChart, but wife states anything with computer she takes care of and is interested in getting him set up, so [employer] link and [employer] help desk number provided. She denies they have any needs and voiced her appreciation for the call.      Laura Hansen RN  Ambulatory     12/30/2020, 10:15 EST      "

## 2020-12-30 NOTE — OUTREACH NOTE
Care Coordination Assessment    Documented/Reviewed By: Laura Hansen RN Date/time: 12/30/2020 10:13 AM   Assessment completed with: patient  Living arrangement: spouse  Support system: family, friends, spouse  Type of residence: private residence  Home care services: No  Equipment used at home: none  Bed or wheelchair confined: No  Inadequate nutrition: No  Medication adherence problem: No  Experiencing side effects from current medications: No  History of fall(s) in last 6 months: No  Difficulty keeping appointments: No  Family aware of the patient's advance care planning wishes: Yes (Comment: Wife states has Living Will )

## 2021-01-12 RX ORDER — SERTRALINE HYDROCHLORIDE 100 MG/1
TABLET, FILM COATED ORAL
Qty: 30 TABLET | Refills: 0 | Status: SHIPPED | OUTPATIENT
Start: 2021-01-12 | End: 2021-02-02 | Stop reason: SDUPTHER

## 2021-02-02 ENCOUNTER — OFFICE VISIT (OUTPATIENT)
Dept: FAMILY MEDICINE CLINIC | Facility: CLINIC | Age: 71
End: 2021-02-02

## 2021-02-02 VITALS
BODY MASS INDEX: 25.98 KG/M2 | SYSTOLIC BLOOD PRESSURE: 138 MMHG | OXYGEN SATURATION: 97 % | HEART RATE: 60 BPM | HEIGHT: 69 IN | DIASTOLIC BLOOD PRESSURE: 82 MMHG | TEMPERATURE: 98.4 F | WEIGHT: 175.4 LBS

## 2021-02-02 DIAGNOSIS — E78.01 FAMILIAL HYPERCHOLESTEROLEMIA: ICD-10-CM

## 2021-02-02 DIAGNOSIS — I10 ESSENTIAL HYPERTENSION: Primary | ICD-10-CM

## 2021-02-02 DIAGNOSIS — E03.9 ACQUIRED HYPOTHYROIDISM: ICD-10-CM

## 2021-02-02 PROCEDURE — 99214 OFFICE O/P EST MOD 30 MIN: CPT | Performed by: FAMILY MEDICINE

## 2021-02-02 RX ORDER — ACYCLOVIR 400 MG/1
400 TABLET ORAL DAILY
Qty: 90 TABLET | Refills: 2 | Status: SHIPPED | OUTPATIENT
Start: 2021-02-02 | End: 2021-11-18

## 2021-02-02 RX ORDER — LOSARTAN POTASSIUM 50 MG/1
50 TABLET ORAL NIGHTLY
Qty: 90 TABLET | Refills: 3 | Status: SHIPPED | OUTPATIENT
Start: 2021-02-02 | End: 2022-01-24 | Stop reason: SDUPTHER

## 2021-02-02 RX ORDER — LEVOTHYROXINE SODIUM 0.15 MG/1
150 TABLET ORAL DAILY
Qty: 90 TABLET | Refills: 3 | Status: SHIPPED | OUTPATIENT
Start: 2021-02-02 | End: 2022-01-24 | Stop reason: SDUPTHER

## 2021-02-02 RX ORDER — SERTRALINE HYDROCHLORIDE 100 MG/1
100 TABLET, FILM COATED ORAL DAILY
Qty: 90 TABLET | Refills: 1 | Status: SHIPPED | OUTPATIENT
Start: 2021-02-02 | End: 2021-07-22 | Stop reason: SDUPTHER

## 2021-02-02 NOTE — PROGRESS NOTES
"Chief Complaint  Hypertension    Subjective          Enio Tapia presents to [facility] for   Hypertension  This is a chronic problem. The current episode started more than 1 year ago. The problem is unchanged. The problem is controlled. Pertinent negatives include no chest pain, headaches, palpitations or shortness of breath. Risk factors: known cad. Current antihypertension treatment includes calcium channel blockers, beta blockers, angiotensin blockers and lifestyle changes. The current treatment provides significant improvement. There are no compliance problems.  Hypertensive end-organ damage includes CAD/MI. There is no history of angina or kidney disease.   Hyperlipidemia  This is a chronic problem. The current episode started more than 1 year ago. The problem is controlled. Exacerbating diseases include hypothyroidism. Factors aggravating his hyperlipidemia include fatty foods. Pertinent negatives include no chest pain, myalgias or shortness of breath. Treatments tried: evolocumab injections  The current treatment provides significant improvement of lipids. There are no compliance problems.  Risk factors for coronary artery disease include dyslipidemia, hypertension and male sex.   Hypothyroidism  This is a chronic problem. The problem occurs constantly. The problem has been unchanged. Pertinent negatives include no arthralgias, chest pain, congestion, fatigue, headaches, joint swelling, myalgias, nausea, sore throat, vomiting or weakness. Treatments tried: levothyroxine. The treatment provided significant relief.       Objective   Vital Signs:   /82   Pulse 60   Temp 98.4 °F (36.9 °C)   Ht 175.3 cm (69\")   Wt 79.6 kg (175 lb 6.4 oz)   SpO2 97%   BMI 25.90 kg/m²     Physical Exam  Vitals signs and nursing note reviewed.   Constitutional:       Appearance: He is well-developed.   HENT:      Head: Normocephalic.      Comments: Laceration over right eyebrow " well-healed     Right Ear: External ear normal.      Left Ear: External ear normal.      Nose: Nose normal.   Eyes:      General: No scleral icterus.     Conjunctiva/sclera: Conjunctivae normal.      Pupils: Pupils are equal, round, and reactive to light.   Neck:      Musculoskeletal: Neck supple.      Thyroid: No thyromegaly.      Vascular: No carotid bruit.   Cardiovascular:      Rate and Rhythm: Normal rate and regular rhythm.   Pulmonary:      Effort: Pulmonary effort is normal.      Breath sounds: Normal breath sounds.   Musculoskeletal: Normal range of motion.   Skin:     General: Skin is warm and dry.      Findings: No rash.   Neurological:      General: No focal deficit present.      Mental Status: He is alert and oriented to person, place, and time.      Comments: No focal deficits no lateralizing signs   Psychiatric:         Mood and Affect: Mood normal.         Behavior: Behavior is cooperative.        Result Review :                 Assessment and Plan    Problem List Items Addressed This Visit        Active Problems    Hyperlipidemia    Relevant Orders    Comprehensive Metabolic Panel    Lipid Panel    Hypertension - Primary    Relevant Medications    losartan (COZAAR) 50 MG tablet    Other Relevant Orders    Comprehensive Metabolic Panel    Acquired hypothyroidism    Relevant Medications    levothyroxine (SYNTHROID, LEVOTHROID) 150 MCG tablet    Other Relevant Orders    TSH          Follow Up   Return in about 6 months (around 8/2/2021) for Medicare Wellness.  Patient was given instructions and counseling regarding his condition or for health maintenance advice. Please see specific information pulled into the AVS if appropriate.

## 2021-02-03 LAB
ALBUMIN SERPL-MCNC: 4.2 G/DL (ref 3.5–5.2)
ALBUMIN/GLOB SERPL: 1.9 G/DL
ALP SERPL-CCNC: 72 U/L (ref 39–117)
ALT SERPL-CCNC: 28 U/L (ref 1–41)
AST SERPL-CCNC: 25 U/L (ref 1–40)
BILIRUB SERPL-MCNC: 0.4 MG/DL (ref 0–1.2)
BUN SERPL-MCNC: 16 MG/DL (ref 8–23)
BUN/CREAT SERPL: 15.2 (ref 7–25)
CALCIUM SERPL-MCNC: 9.2 MG/DL (ref 8.6–10.5)
CHLORIDE SERPL-SCNC: 104 MMOL/L (ref 98–107)
CHOLEST SERPL-MCNC: 131 MG/DL (ref 0–200)
CO2 SERPL-SCNC: 27.4 MMOL/L (ref 22–29)
CREAT SERPL-MCNC: 1.05 MG/DL (ref 0.76–1.27)
GLOBULIN SER CALC-MCNC: 2.2 GM/DL
GLUCOSE SERPL-MCNC: 92 MG/DL (ref 65–99)
HDLC SERPL-MCNC: 38 MG/DL (ref 40–60)
LDLC SERPL CALC-MCNC: 67 MG/DL (ref 0–100)
POTASSIUM SERPL-SCNC: 5.3 MMOL/L (ref 3.5–5.2)
PROT SERPL-MCNC: 6.4 G/DL (ref 6–8.5)
SODIUM SERPL-SCNC: 138 MMOL/L (ref 136–145)
TRIGL SERPL-MCNC: 153 MG/DL (ref 0–150)
TSH SERPL DL<=0.005 MIU/L-ACNC: 2.79 UIU/ML (ref 0.27–4.2)
VLDLC SERPL CALC-MCNC: 26 MG/DL (ref 5–40)

## 2021-03-05 ENCOUNTER — OFFICE VISIT (OUTPATIENT)
Dept: CARDIOLOGY | Facility: CLINIC | Age: 71
End: 2021-03-05

## 2021-03-05 VITALS
SYSTOLIC BLOOD PRESSURE: 132 MMHG | DIASTOLIC BLOOD PRESSURE: 78 MMHG | WEIGHT: 177 LBS | HEIGHT: 69 IN | HEART RATE: 75 BPM | BODY MASS INDEX: 26.22 KG/M2 | OXYGEN SATURATION: 95 %

## 2021-03-05 DIAGNOSIS — I49.3 FREQUENT PVCS: ICD-10-CM

## 2021-03-05 DIAGNOSIS — I10 ESSENTIAL HYPERTENSION: ICD-10-CM

## 2021-03-05 DIAGNOSIS — I25.118 CORONARY ARTERY DISEASE OF NATIVE ARTERY OF NATIVE HEART WITH STABLE ANGINA PECTORIS (HCC): Primary | ICD-10-CM

## 2021-03-05 DIAGNOSIS — E78.01 FAMILIAL HYPERCHOLESTEROLEMIA: ICD-10-CM

## 2021-03-05 PROCEDURE — 99214 OFFICE O/P EST MOD 30 MIN: CPT | Performed by: INTERNAL MEDICINE

## 2021-03-05 PROCEDURE — 93000 ELECTROCARDIOGRAM COMPLETE: CPT | Performed by: INTERNAL MEDICINE

## 2021-03-05 RX ORDER — CLOPIDOGREL BISULFATE 75 MG/1
75 TABLET ORAL DAILY
Qty: 90 TABLET | Refills: 3 | Status: SHIPPED | OUTPATIENT
Start: 2021-03-05 | End: 2022-03-22

## 2021-03-05 RX ORDER — ISOSORBIDE MONONITRATE 60 MG/1
60 TABLET, EXTENDED RELEASE ORAL EVERY MORNING
Qty: 90 TABLET | Refills: 3 | Status: SHIPPED | OUTPATIENT
Start: 2021-03-05 | End: 2022-05-16

## 2021-03-05 RX ORDER — BISOPROLOL FUMARATE 5 MG/1
5 TABLET, FILM COATED ORAL DAILY
Qty: 90 TABLET | Refills: 3 | Status: SHIPPED | OUTPATIENT
Start: 2021-03-05 | End: 2022-03-02

## 2021-03-05 NOTE — PROGRESS NOTES
"[facility]    Encounter Date: 2021    Patient ID: Enio Tapia is a 70 y.o. male.  : 1950     PCP: Troy Mckay MD       Chief Complaint: Coronary Artery Disease      PROBLEM LIST:  1. Coronary artery disease  a. As/P3 0.5 x 32 Taxus JOHAAN mid RCA lesion 10/15/04  b. Normal stress echo 2010  c. Echo 2010 showed normal EF of 60% with trace TR and MR.  d. [facility] 3/31/17For unstable angina: Diffuse 70% stenosis of mid LAD noted.  Stented with Xience 3.0 x 33 JOHANA reducing stenosis to 0%.  Also 70% discrete stenosis in proximal first diagonal stented with Xience Alpine 2.25 x 15 JOHANA reducing stenosis to 0%.  Previous stenting proximal to mid RCA widely patent. Normal LVEF.  e. Echo 3/31/17: EF 55%.  Mild LVH noted.  f. [facility], 2020: A small-to-medium-sized infarct located in the inferior wall and septal wall with mild valarie-infarct ischemia. The test is remarkable for brief \"chest burning\" which resolved in early recovery. EF 71%.  g. [facility], 2020: EF 60%. Severe disease of the distal RCA continuing into the proximal PDA; stents reducing the 80% stenosis to 0%. Moderate disease of the proximal RCA, stented with 4.0 x 19 mm JOHANA and reduced to 0%. 80% stenosis of the PLV branch of the RCA reduced to less than 40%. Nonobstructive 40% stenosis of the proximal circumflex.   2. PVCs:  a. 24h Holter, 2018: Min HR 61, mean 70, max 108. 25,091 PVCs, mostly bigeminy.  b. PVC ablation, 2018 - 1. Successful catheter mapping ablation of 2 ventricular foci. 2. Induction of ectopy was difficult. Less than 10 ectopic beats were able to be induced over a 2 hour time period.  c. 48h Holter, 2019: 01882 PVCs, 11.8% burden  3. Hypertension  4. Hyperlipidemia  5. Hypothyroidism  6. Anxiety disorder  7. Glaucoma  8. Osteoarthritis  9. GRABIEL with CPAP compliance.  10. History of horse bite 2018 on the right side of the chest, with severe chest " trauma.    History of Present Illness  Patient presents today for a 6 month follow-up with a history of coronary artery disease, frequent PVC's, and cardiac risk factors. Since last visit, he has been feeling well overall from a cardiovascular standpoint. He notes that he has a soft tissue scar on his chest that does not bother him but has been present since his surgery for chest wall trauma. He also does not experience any pain at the site. He had his second COVID-19 vaccine yesterday and experienced a few episodes of palpitations this morning. Patient otherwise denies chest pain, shortness of breath, PND, edema, syncope, or presyncope at this time.  He said on his drive to our office this morning he was experiencing some palpitations.  No angina or CHF type symptoms.    Allergies   Allergen Reactions   • Pravastatin Myalgia   • Statins Myalgia     ESPECIALLY LIPITOR         Current Outpatient Medications:   •  acyclovir (ZOVIRAX) 400 MG tablet, Take 1 tablet by mouth Daily., Disp: 90 tablet, Rfl: 2  •  aspirin 81 MG EC tablet, Take 81 mg by mouth Every Night., Disp: , Rfl:   •  clopidogrel (PLAVIX) 75 MG tablet, Take 1 tablet by mouth Daily., Disp: 90 tablet, Rfl: 3  •  isosorbide mononitrate (IMDUR) 60 MG 24 hr tablet, Take 1 tablet by mouth Every Morning., Disp: 90 tablet, Rfl: 3  •  levothyroxine (SYNTHROID, LEVOTHROID) 150 MCG tablet, Take 1 tablet by mouth Daily., Disp: 90 tablet, Rfl: 3  •  losartan (COZAAR) 50 MG tablet, Take 1 tablet by mouth Every Night., Disp: 90 tablet, Rfl: 3  •  Multiple Vitamins-Minerals (CENTRUM SILVER PO), Take 1 tablet by mouth Daily., Disp: , Rfl:   •  nitroglycerin (NITROSTAT) 0.4 MG SL tablet, 1 under the tongue as needed for angina, may repeat q5mins for up three doses, Disp: 100 tablet, Rfl: 11  •  Repatha SureClick 140 MG/ML solution auto-injector, INJECT 140MG(1ML) UNDER THE SKIN EVERY 14 DAYS, Disp: 2 mL, Rfl: 11  •  sertraline (ZOLOFT) 100 MG tablet, Take 1 tablet by  "mouth Daily., Disp: 90 tablet, Rfl: 1  •  travoprost, PAUL free, (TRAVATAN) 0.004 % solution ophthalmic solution, Administer 1 drop to both eyes Every Evening. in affected eye(s) , Disp: , Rfl:   •  bisoprolol (ZEBeta) 5 MG tablet, Take 1 tablet by mouth Daily., Disp: 90 tablet, Rfl: 3    The following portions of the patient's history were reviewed and updated as appropriate: allergies, current medications, past family history, past medical history, past social history, past surgical history and problem list.    ROS  Review of Systems   Constitution: Negative for chills, fever, fatigue, generalized weakness.   Cardiovascular: Negative for chest pain, dyspnea on exertion, leg swelling, orthopnea, and syncope. Positive for palpitations.  Respiratory: Negative for cough, shortness of breath, and wheezing.  HENT: Negative for ear pain, nosebleeds, and tinnitus.  Gastrointestinal: Negative for abdominal pain, constipation, diarrhea, nausea and vomiting.   Genitourinary: No urinary symptoms.  Musculoskeletal: Negative for muscle cramps.  Neurological: Negative for dizziness, headaches, loss of balance, numbness, and symptoms of stroke.  Psychiatric: Normal mental status.     All other systems reviewed and are negative.        Objective:     /78 (BP Location: Left arm, Patient Position: Sitting)   Pulse 75   Ht 175.3 cm (69\")   Wt 80.3 kg (177 lb)   SpO2 95%   BMI 26.14 kg/m²      Physical Exam  Constitutional: Patient appears well-developed and well-nourished.   HENT: HEENT exam unremarkable.   Neck: Neck supple. No JVD present. No carotid bruits.   Cardiovascular: Normal rate, regular rhythm and normal heart sounds. No murmur heard.   2+ symmetric pulses.   Pulmonary/Chest: Breath sounds normal. Does not exhibit tenderness.   Abdominal: Abdomen benign.   Musculoskeletal: Does not exhibit edema.   Neurological: Neurological exam unremarkable.   Vitals reviewed.    Data Review:   Lab Results   Component Value " Date    GLU 92 02/02/2021    BUN 16 02/02/2021    CREATININE 1.05 02/02/2021    EGFRIFNONA 70 02/02/2021    EGFRIFAFRI 84 02/02/2021    BCR 15.2 02/02/2021    K 5.3 (H) 02/02/2021    CO2 27.4 02/02/2021    CALCIUM 9.2 02/02/2021    ALBUMIN 4.20 02/02/2021    AST 25 02/02/2021    ALT 28 02/02/2021     Lab Results   Component Value Date    CHLPL 131 02/02/2021    TRIG 153 (H) 02/02/2021    HDL 38 (L) 02/02/2021    LDL 67 02/02/2021      Lab Results   Component Value Date    WBC 6.39 09/21/2020    RBC 4.44 09/21/2020    HGB 14.5 09/21/2020    HCT 42.4 09/21/2020    MCV 95.5 09/21/2020     09/21/2020     Lab Results   Component Value Date    TSH 2.790 02/02/2021     Lab Results   Component Value Date    HGBA1C 5.20 07/16/2020          ECG 12 Lead    Date/Time: 3/5/2021 10:32 AM  Performed by: Candy Ribeiro MD  Authorized by: Candy Ribeiro MD   Comparison: compared with previous ECG from 7/16/2020  Comparison to previous ECG: No significant change except for increased heart rate.   Rhythm: sinus rhythm  Rate: normal  BPM: 86  Other findings: non-specific ST-T wave changes               Assessment:      Diagnosis Plan   1. Coronary artery disease of native coronary artery of native heart with stable angina pectoris Stable with no current angina; continue aspirin and Plavix for DATP and Imdur 60 mg daily.   2. Essential hypertension  Well-controlled; continue losartan 50 mg nightly and begin bisoprolol 5 mg daily.   3. Familial hypercholesterolemia  Well-controlled; continue    4. Frequent PVCs, none noted on today's exam. Begin bisoprolol 5 mg daily for rate control.       Plan:   Stable cardiac status.  No current angina or CHF.  Begin bisoprolol 5 mg daily due to CAD, higher than average resting heart rate and history of PVCs..  Continue all other current medications.   FU in 6 MO, sooner as needed.  Advanced care planning discussed with MARIO Tam.   Thank you for allowing us to participate in the care  of your patient.     Scribed for Candy Ribeior MD by Rand Shah. 3/5/2021  10:41 EST      I, Candy Ribeiro MD, personally performed the services described in this documentation as scribed by the above named individual in my presence, and it is both accurate and complete.  3/5/2021  11:08 EST        Please note that portions of this note may have been completed with a voice recognition program. Efforts were made to edit the dictations, but occasionally words are mistranscribed.

## 2021-07-22 ENCOUNTER — OFFICE VISIT (OUTPATIENT)
Dept: FAMILY MEDICINE CLINIC | Facility: CLINIC | Age: 71
End: 2021-07-22

## 2021-07-22 VITALS
OXYGEN SATURATION: 98 % | SYSTOLIC BLOOD PRESSURE: 126 MMHG | TEMPERATURE: 98 F | WEIGHT: 171.4 LBS | DIASTOLIC BLOOD PRESSURE: 78 MMHG | HEART RATE: 53 BPM | HEIGHT: 69 IN | BODY MASS INDEX: 25.39 KG/M2

## 2021-07-22 DIAGNOSIS — Z00.00 MEDICARE ANNUAL WELLNESS VISIT, SUBSEQUENT: Primary | ICD-10-CM

## 2021-07-22 DIAGNOSIS — E78.01 FAMILIAL HYPERCHOLESTEROLEMIA: ICD-10-CM

## 2021-07-22 DIAGNOSIS — Z12.11 SCREENING FOR COLON CANCER: ICD-10-CM

## 2021-07-22 DIAGNOSIS — F51.02 ADJUSTMENT INSOMNIA: ICD-10-CM

## 2021-07-22 DIAGNOSIS — Z12.5 PROSTATE CANCER SCREENING: ICD-10-CM

## 2021-07-22 DIAGNOSIS — I10 ESSENTIAL HYPERTENSION: ICD-10-CM

## 2021-07-22 DIAGNOSIS — E03.9 ACQUIRED HYPOTHYROIDISM: ICD-10-CM

## 2021-07-22 LAB
BILIRUB BLD-MCNC: NEGATIVE MG/DL
CLARITY, POC: CLEAR
COLOR UR: YELLOW
EXPIRATION DATE: NORMAL
GLUCOSE UR STRIP-MCNC: NEGATIVE MG/DL
KETONES UR QL: NEGATIVE
LEUKOCYTE EST, POC: NEGATIVE
Lab: NORMAL
NITRITE UR-MCNC: NEGATIVE MG/ML
PH UR: 5.5 [PH] (ref 5–8)
PROT UR STRIP-MCNC: NEGATIVE MG/DL
RBC # UR STRIP: NEGATIVE /UL
SP GR UR: 1.02 (ref 1–1.03)
UROBILINOGEN UR QL: NORMAL

## 2021-07-22 PROCEDURE — 1159F MED LIST DOCD IN RCRD: CPT | Performed by: FAMILY MEDICINE

## 2021-07-22 PROCEDURE — 96160 PT-FOCUSED HLTH RISK ASSMT: CPT | Performed by: FAMILY MEDICINE

## 2021-07-22 PROCEDURE — G0439 PPPS, SUBSEQ VISIT: HCPCS | Performed by: FAMILY MEDICINE

## 2021-07-22 PROCEDURE — 81003 URINALYSIS AUTO W/O SCOPE: CPT | Performed by: FAMILY MEDICINE

## 2021-07-22 RX ORDER — ZOLPIDEM TARTRATE 5 MG/1
5 TABLET ORAL NIGHTLY PRN
Qty: 30 TABLET | Refills: 0 | Status: SHIPPED | OUTPATIENT
Start: 2021-07-22 | End: 2022-05-30

## 2021-07-22 RX ORDER — SERTRALINE HYDROCHLORIDE 100 MG/1
100 TABLET, FILM COATED ORAL DAILY
Qty: 90 TABLET | Refills: 1 | Status: SHIPPED | OUTPATIENT
Start: 2021-07-22 | End: 2022-01-24 | Stop reason: SDUPTHER

## 2021-07-22 NOTE — PROGRESS NOTES
The ABCs of the Annual Wellness Visit  Subsequent Medicare Wellness Visit    Chief Complaint   Patient presents with   • Welcome To Medicare       Subjective   History of Present Illness:  Enio Tapia is a 71 y.o. male who presents for a Subsequent Medicare Wellness Visit.    HEALTH RISK ASSESSMENT    Recent Hospitalizations:  No hospitalization(s) within the last year.    Current Medical Providers:  Patient Care Team:  Troy Mckay MD as PCP - General (Family Medicine)  Troy Mckay MD as PCP - Family Medicine  Candy Ribeiro MD as Consulting Physician (Cardiology)    Smoking Status:  Social History     Tobacco Use   Smoking Status Never Smoker   Smokeless Tobacco Former User   • Types: Chew, Snuff       Alcohol Consumption:  Social History     Substance and Sexual Activity   Alcohol Use Yes   • Alcohol/week: 1.0 standard drinks   • Types: 1 Cans of beer per week    Comment: occas       Depression Screen:   PHQ-2/PHQ-9 Depression Screening 7/22/2021   Little interest or pleasure in doing things 0   Feeling down, depressed, or hopeless 0   Total Score 0       Fall Risk Screen:  RHONDA Fall Risk Assessment was completed, and patient is at LOW risk for falls.Assessment completed on:7/22/2021    Health Habits and Functional and Cognitive Screening:  Functional & Cognitive Status 7/22/2021   Do you have difficulty preparing food and eating? No   Do you have difficulty bathing yourself, getting dressed or grooming yourself? No   Do you have difficulty using the toilet? No   Do you have difficulty moving around from place to place? No   Do you have trouble with steps or getting out of a bed or a chair? No   Current Diet Well Balanced Diet   Dental Exam Up to date   Eye Exam Up to date   Exercise (times per week) 5 times per week   Current Exercises Include Walking   Do you need help using the phone?  No   Are you deaf or do you have serious difficulty hearing?  No   Do you need help with transportation? No    Do you need help shopping? No   Do you need help preparing meals?  No   Do you need help with housework?  No   Do you need help with laundry? No   Do you need help taking your medications? No   Do you need help managing money? No   Do you ever drive or ride in a car without wearing a seat belt? No         Does the patient have evidence of cognitive impairment? No    Asprin use counseling:Taking ASA appropriately as indicated    Age-appropriate Screening Schedule:  Refer to the list below for future screening recommendations based on patient's age, sex and/or medical conditions. Orders for these recommended tests are listed in the plan section. The patient has been provided with a written plan.    Health Maintenance   Topic Date Due   • ZOSTER VACCINE (2 of 2) 02/07/2013   • INFLUENZA VACCINE  10/01/2021   • LIPID PANEL  02/02/2022   • TDAP/TD VACCINES (3 - Td or Tdap) 05/19/2028          The following portions of the patient's history were reviewed and updated as appropriate: allergies, current medications, past family history, past medical history, past social history, past surgical history and problem list.    Outpatient Medications Prior to Visit   Medication Sig Dispense Refill   • acyclovir (ZOVIRAX) 400 MG tablet Take 1 tablet by mouth Daily. 90 tablet 2   • aspirin 81 MG EC tablet Take 81 mg by mouth Every Night.     • bisoprolol (ZEBeta) 5 MG tablet Take 1 tablet by mouth Daily. 90 tablet 3   • clopidogrel (PLAVIX) 75 MG tablet Take 1 tablet by mouth Daily. 90 tablet 3   • isosorbide mononitrate (IMDUR) 60 MG 24 hr tablet Take 1 tablet by mouth Every Morning. 90 tablet 3   • levothyroxine (SYNTHROID, LEVOTHROID) 150 MCG tablet Take 1 tablet by mouth Daily. 90 tablet 3   • losartan (COZAAR) 50 MG tablet Take 1 tablet by mouth Every Night. 90 tablet 3   • Multiple Vitamins-Minerals (CENTRUM SILVER PO) Take 1 tablet by mouth Daily.     • nitroglycerin (NITROSTAT) 0.4 MG SL tablet 1 under the tongue as needed  for angina, may repeat q5mins for up three doses 100 tablet 11   • Repatha SureClick 140 MG/ML solution auto-injector INJECT 140MG(1ML) UNDER THE SKIN EVERY 14 DAYS 2 mL 11   • travoprost, PAUL free, (TRAVATAN) 0.004 % solution ophthalmic solution Administer 1 drop to both eyes Every Evening. in affected eye(s)      • sertraline (ZOLOFT) 100 MG tablet Take 1 tablet by mouth Daily. 90 tablet 1     No facility-administered medications prior to visit.       Patient Active Problem List   Diagnosis   • Hyperlipidemia   • Hypertension   • Acquired hypothyroidism   • Irritable bowel syndrome   • Sleep apnea   • Generalized anxiety disorder   • Inguinal hernia   • Herpes simplex infection   • Coronary artery disease involving native coronary artery of native heart with unstable angina pectoris (CMS/HCC)   • Hematoma of right chest wall   • Frequent unifocal PVCs   • Frequent PVCs   • Rib contusion, left, initial encounter   • Stable angina pectoris (CMS/HCC)   • Abnormal stress test   • Unstable angina (CMS/HCC)   • Cellulitis of left hand       Advanced Care Planning:  ACP discussion was held with the patient during this visit. Patient does not have an advance directive, information provided.    Review of Systems   Constitutional: Negative for activity change and unexpected weight change.   HENT: Negative for congestion and sore throat.    Eyes: Negative for pain and visual disturbance.        Recent eye exam   Respiratory: Negative for cough and shortness of breath.    Cardiovascular: Negative for chest pain, palpitations and leg swelling.   Gastrointestinal: Negative for diarrhea, nausea and vomiting.        IBS unchanged   Endocrine: Negative for cold intolerance and heat intolerance.   Genitourinary: Negative for dysuria and hematuria.   Musculoskeletal: Positive for arthralgias. Negative for joint swelling.        Arthritis in the hands   Skin: Negative for color change and rash.        Some scalp irritation  "  Allergic/Immunologic: Negative for environmental allergies and food allergies.   Neurological: Negative for syncope and headaches.   Hematological: Negative for adenopathy. Does not bruise/bleed easily.   Psychiatric/Behavioral: Negative for dysphoric mood and sleep disturbance. The patient is not nervous/anxious.        Compared to one year ago, the patient feels his physical health is the same.  Compared to one year ago, the patient feels his mental health is the same.    Reviewed chart for potential of high risk medication in the elderly: yes  Reviewed chart for potential of harmful drug interactions in the elderly:not applicable    Objective         Vitals:    07/22/21 0918   BP: 126/78   Pulse: 53   Temp: 98 °F (36.7 °C)   SpO2: 98%   Weight: 77.7 kg (171 lb 6.4 oz)   Height: 175.3 cm (69\")       Body mass index is 25.31 kg/m².  Discussed the patient's BMI with him. The BMI is in the acceptable range.    Physical Exam  Vitals and nursing note reviewed.   Constitutional:       Appearance: Normal appearance. He is well-developed.   HENT:      Head: Normocephalic.      Right Ear: External ear normal.      Left Ear: External ear normal.      Nose: Nose normal.   Eyes:      General: No scleral icterus.     Conjunctiva/sclera: Conjunctivae normal.      Pupils: Pupils are equal, round, and reactive to light.   Neck:      Thyroid: No thyromegaly.      Vascular: No carotid bruit.   Cardiovascular:      Rate and Rhythm: Normal rate and regular rhythm.      Pulses: Normal pulses.      Heart sounds: Normal heart sounds.   Pulmonary:      Effort: Pulmonary effort is normal.      Breath sounds: Normal breath sounds.   Abdominal:      General: Bowel sounds are normal.      Palpations: There is no mass.      Tenderness: There is no abdominal tenderness.   Musculoskeletal:         General: Normal range of motion.      Cervical back: Normal range of motion and neck supple.      Right lower leg: No edema.      Left lower leg: " No edema.   Lymphadenopathy:      Cervical: No cervical adenopathy.   Skin:     General: Skin is warm and dry.      Findings: No rash.   Neurological:      General: No focal deficit present.      Mental Status: He is alert and oriented to person, place, and time.      Comments: No focal deficits no lateralizing signs   Psychiatric:         Mood and Affect: Mood normal.         Behavior: Behavior is cooperative.               Assessment/Plan   Medicare Risks and Personalized Health Plan  CMS Preventative Services Quick Reference  Cardiovascular risk  Colon Cancer Screening  Immunizations Discussed/Encouraged (specific immunizations; Shingrix )    The above risks/problems have been discussed with the patient.  Pertinent information has been shared with the patient in the After Visit Summary.  Follow up plans and orders are seen below in the Assessment/Plan Section.    Diagnoses and all orders for this visit:    1. Medicare annual wellness visit, subsequent (Primary)  -     POC Urinalysis Dipstick, Automated    2. Familial hypercholesterolemia  -     Comprehensive Metabolic Panel  -     Lipid Panel    3. Essential hypertension  -     Comprehensive Metabolic Panel  -     CBC (No Diff)    4. Acquired hypothyroidism  -     TSH    5. Adjustment insomnia  -     zolpidem (Ambien) 5 MG tablet; Take 1 tablet by mouth At Night As Needed for Sleep.  Dispense: 30 tablet; Refill: 0    6. Prostate cancer screening  -     PSA Screen    7. Screening for colon cancer  -     Ambulatory Referral For Screening Colonoscopy    Other orders  -     sertraline (ZOLOFT) 100 MG tablet; Take 1 tablet by mouth Daily.  Dispense: 90 tablet; Refill: 1      Follow Up:  No follow-ups on file.     An After Visit Summary and PPPS were given to the patient.

## 2021-07-23 LAB
ALBUMIN SERPL-MCNC: 4.3 G/DL (ref 3.5–5.2)
ALBUMIN/GLOB SERPL: 2 G/DL
ALP SERPL-CCNC: 67 U/L (ref 39–117)
ALT SERPL-CCNC: 30 U/L (ref 1–41)
AST SERPL-CCNC: 25 U/L (ref 1–40)
BILIRUB SERPL-MCNC: 0.3 MG/DL (ref 0–1.2)
BUN SERPL-MCNC: 21 MG/DL (ref 8–23)
BUN/CREAT SERPL: 21.6 (ref 7–25)
CALCIUM SERPL-MCNC: 9.4 MG/DL (ref 8.6–10.5)
CHLORIDE SERPL-SCNC: 108 MMOL/L (ref 98–107)
CHOLEST SERPL-MCNC: 160 MG/DL (ref 0–200)
CO2 SERPL-SCNC: 26.8 MMOL/L (ref 22–29)
CREAT SERPL-MCNC: 0.97 MG/DL (ref 0.76–1.27)
ERYTHROCYTE [DISTWIDTH] IN BLOOD BY AUTOMATED COUNT: 13 % (ref 12.3–15.4)
GLOBULIN SER CALC-MCNC: 2.1 GM/DL
GLUCOSE SERPL-MCNC: 99 MG/DL (ref 65–99)
HCT VFR BLD AUTO: 41.3 % (ref 37.5–51)
HDLC SERPL-MCNC: 46 MG/DL (ref 40–60)
HGB BLD-MCNC: 13.8 G/DL (ref 13–17.7)
LDLC SERPL CALC-MCNC: 96 MG/DL (ref 0–100)
MCH RBC QN AUTO: 31.9 PG (ref 26.6–33)
MCHC RBC AUTO-ENTMCNC: 33.4 G/DL (ref 31.5–35.7)
MCV RBC AUTO: 95.6 FL (ref 79–97)
PLATELET # BLD AUTO: 172 10*3/MM3 (ref 140–450)
POTASSIUM SERPL-SCNC: 5.4 MMOL/L (ref 3.5–5.2)
PROT SERPL-MCNC: 6.4 G/DL (ref 6–8.5)
PSA SERPL-MCNC: 1.68 NG/ML (ref 0–4)
RBC # BLD AUTO: 4.32 10*6/MM3 (ref 4.14–5.8)
SODIUM SERPL-SCNC: 140 MMOL/L (ref 136–145)
TRIGL SERPL-MCNC: 97 MG/DL (ref 0–150)
TSH SERPL DL<=0.005 MIU/L-ACNC: 4.39 UIU/ML (ref 0.27–4.2)
VLDLC SERPL CALC-MCNC: 18 MG/DL (ref 5–40)
WBC # BLD AUTO: 5.35 10*3/MM3 (ref 3.4–10.8)

## 2021-07-29 ENCOUNTER — TELEPHONE (OUTPATIENT)
Dept: GASTROENTEROLOGY | Facility: CLINIC | Age: 71
End: 2021-07-29

## 2021-07-29 NOTE — TELEPHONE ENCOUNTER
Martinez [place], 1950 is LOW RISK  for scheduled procedure/surgery from a cardiac/EP standpoint.  Any questions please call [phone].    [x]  Continue Aspirin   [x]  Hold Plavix 5 days        Sincerely,      Candy Ribeiro

## 2021-07-30 NOTE — TELEPHONE ENCOUNTER
I tried to call Mr Tapia on his home & cell phone concerning cardiac clearance information. No answer; left voice message.

## 2021-08-31 DIAGNOSIS — Z12.11 SCREENING FOR COLON CANCER: Primary | ICD-10-CM

## 2021-09-07 ENCOUNTER — OUTSIDE FACILITY SERVICE (OUTPATIENT)
Dept: GASTROENTEROLOGY | Facility: CLINIC | Age: 71
End: 2021-09-07

## 2021-09-07 PROCEDURE — 88305 TISSUE EXAM BY PATHOLOGIST: CPT | Performed by: INTERNAL MEDICINE

## 2021-09-07 PROCEDURE — 45380 COLONOSCOPY AND BIOPSY: CPT | Performed by: INTERNAL MEDICINE

## 2021-09-07 PROCEDURE — G0500 MOD SEDAT ENDO SERVICE >5YRS: HCPCS | Performed by: INTERNAL MEDICINE

## 2021-09-08 ENCOUNTER — LAB REQUISITION (OUTPATIENT)
Dept: LAB | Facility: HOSPITAL | Age: 71
End: 2021-09-08

## 2021-09-08 DIAGNOSIS — Z12.11 ENCOUNTER FOR SCREENING FOR MALIGNANT NEOPLASM OF COLON: ICD-10-CM

## 2021-09-09 ENCOUNTER — TELEPHONE (OUTPATIENT)
Dept: GASTROENTEROLOGY | Facility: CLINIC | Age: 71
End: 2021-09-09

## 2021-09-09 LAB
CYTO UR: NORMAL
LAB AP CASE REPORT: NORMAL
LAB AP CLINICAL INFORMATION: NORMAL
PATH REPORT.FINAL DX SPEC: NORMAL
PATH REPORT.GROSS SPEC: NORMAL

## 2021-09-09 NOTE — TELEPHONE ENCOUNTER
----- Message from Baltazar Peterson MD sent at 9/9/2021  3:11 PM EDT -----  Please let Enio know the polyp was benign.  He can follow-up in 10 years time.

## 2021-09-16 NOTE — PROGRESS NOTES
"[facility]    Encounter Date: 2021    Patient ID: Enio Tapia is a 71 y.o. male.  : 1950     PCP: Troy Mckay MD       Chief Complaint: Coronary artery disease of native artery of native heart wit      PROBLEM LIST:  1. Coronary artery disease  a. As/P3 0.5 x 32 Taxus JOHANA mid RCA lesion 10/15/04  b. Normal stress echo 2010  c. Echo 2010 showed normal EF of 60% with trace TR and MR.  d. [facility] 3/31/17For unstable angina: Diffuse 70% stenosis of mid LAD noted.  Stented with Xience 3.0 x 33 JOHANA reducing stenosis to 0%.  Also 70% discrete stenosis in proximal first diagonal stented with Xience Alpine 2.25 x 15 JOHANA reducing stenosis to 0%.  Previous stenting proximal to mid RCA widely patent. Normal LVEF.  e. Echo 3/31/17: EF 55%.  Mild LVH noted.  f. MPS, 2020: A small-to-medium-sized infarct located in the inferior wall and septal wall with mild valarie-infarct ischemia. The test is remarkable for brief \"chest burning\" which resolved in early recovery. EF 71%.  g. [facility], 2020: EF 60%. Severe disease of the distal RCA continuing into the proximal PDA; stents reducing the 80% stenosis to 0%. Moderate disease of the proximal RCA, stented with 4.0 x 19 mm JOHANA and reduced to 0%. 80% stenosis of the PLV branch of the RCA reduced to less than 40%. Nonobstructive 40% stenosis of the proximal circumflex.   2. PVCs:  a. 24h Holter, 2018: Min HR 61, mean 70, max 108. 25,091 PVCs, mostly bigeminy.  b. PVC ablation, 2018 - 1. Successful catheter mapping ablation of 2 ventricular foci. 2. Induction of ectopy was difficult. Less than 10 ectopic beats were able to be induced over a 2 hour time period.  c. 48h Holter, 2019: 09261 PVCs, 11.8% burden  3. Hypertension  4. Hyperlipidemia  5. Hypothyroidism  6. Anxiety disorder  7. Glaucoma  8. Osteoarthritis  9. GRABIEL with CPAP compliance.  10. History of horse bite 2018 on the right side of the " chest, with severe chest trauma.    History of Present Illness  Patient presents today for a 6 month follow-up with a history of coronary artery disease, frequent PVC's, and cardiac risk factors. Since last visit, he's experienced occasional chest pain unsure of what its brought on by but feels is may be muscular related.  This occurs randomly and is sharp/localized in nature.  Still experiences increase in his heart rate upon first waking up but this is improved since addition of bisoprolol.  States that he is able to engage in most of his desired activities without limitations and the chest pain has not been restrictive for daily activities. Patient denies shortness of breath, palpitations, edema, dizziness, and syncope.     Allergies   Allergen Reactions   • Pravastatin Myalgia   • Statins Myalgia     ESPECIALLY LIPITOR         Current Outpatient Medications:   •  acyclovir (ZOVIRAX) 400 MG tablet, Take 1 tablet by mouth Daily., Disp: 90 tablet, Rfl: 2  •  aspirin 81 MG EC tablet, Take 81 mg by mouth Every Night., Disp: , Rfl:   •  bisoprolol (ZEBeta) 5 MG tablet, Take 1 tablet by mouth Daily., Disp: 90 tablet, Rfl: 3  •  clopidogrel (PLAVIX) 75 MG tablet, Take 1 tablet by mouth Daily., Disp: 90 tablet, Rfl: 3  •  isosorbide mononitrate (IMDUR) 60 MG 24 hr tablet, Take 1 tablet by mouth Every Morning., Disp: 90 tablet, Rfl: 3  •  levothyroxine (SYNTHROID, LEVOTHROID) 150 MCG tablet, Take 1 tablet by mouth Daily., Disp: 90 tablet, Rfl: 3  •  losartan (COZAAR) 50 MG tablet, Take 1 tablet by mouth Every Night., Disp: 90 tablet, Rfl: 3  •  Multiple Vitamins-Minerals (CENTRUM SILVER PO), Take 1 tablet by mouth Daily., Disp: , Rfl:   •  nitroglycerin (NITROSTAT) 0.4 MG SL tablet, 1 under the tongue as needed for angina, may repeat q5mins for up three doses, Disp: 100 tablet, Rfl: 11  •  Repatha SureClick 140 MG/ML solution auto-injector, INJECT 140MG(1ML) UNDER THE SKIN EVERY 14 DAYS, Disp: 2 mL, Rfl: 11  •  sertraline  "(ZOLOFT) 100 MG tablet, Take 1 tablet by mouth Daily., Disp: 90 tablet, Rfl: 1  •  travoprost, BAK free, (TRAVATAN) 0.004 % solution ophthalmic solution, Administer 1 drop to both eyes Every Evening. in affected eye(s) , Disp: , Rfl:   •  zolpidem (Ambien) 5 MG tablet, Take 1 tablet by mouth At Night As Needed for Sleep., Disp: 30 tablet, Rfl: 0  •  Sod Picosulfate-Mag Ox-Cit Acd 10-3.5-12 MG-GM -GM/160ML solution, Take 1 kit by mouth Take As Directed. Follow instructions mailed to your home. If you didn't receive instructions; call [phone]., Disp: 320 mL, Rfl: 0    The following portions of the patient's history were reviewed and updated as appropriate: allergies, current medications, past family history, past medical history, past social history, past surgical history and problem list.    ROS  Review of Systems   Constitution: Negative for chills, fever, fatigue, generalized weakness.   Cardiovascular: Negative for dyspnea on exertion, leg swelling, palpitations, orthopnea, and syncope. Positive for chest pain  Respiratory: Negative for cough, shortness of breath, and wheezing.  HENT: Negative for ear pain, nosebleeds, and tinnitus.  Gastrointestinal: Negative for abdominal pain, constipation, diarrhea, nausea and vomiting.   Genitourinary: No urinary symptoms.  Musculoskeletal: Negative for muscle cramps.  Neurological: Negative for dizziness, headaches, loss of balance, numbness, and symptoms of stroke.  Psychiatric: Normal mental status.     All other systems reviewed and are negative.        Objective:     BP 92/60 (BP Location: Right arm, Patient Position: Sitting)   Pulse 52   Ht 175.3 cm (69\")   Wt 78 kg (172 lb)   SpO2 93%   BMI 25.40 kg/m²      Physical Exam  Constitutional: Patient appears well-developed and well-nourished.   HENT: HEENT exam unremarkable.   Neck: Neck supple. No JVD present. No carotid bruits.   Cardiovascular: Normal rate, regular rhythm and normal heart sounds. No murmur " heard.   2+ symmetric pulses.   Pulmonary/Chest: Breath sounds normal. Does not exhibit tenderness.   Abdominal: Abdomen benign.   Musculoskeletal: Does not exhibit edema.   Neurological: Neurological exam unremarkable.   Vitals reviewed.    Data Review:   Lab Results   Component Value Date    GLU 99 07/22/2021    BUN 21 07/22/2021    CREATININE 0.97 07/22/2021    EGFRIFNONA 76 07/22/2021    EGFRIFAFRI 92 07/22/2021    BCR 21.6 07/22/2021    K 5.4 (H) 07/22/2021    CO2 26.8 07/22/2021    CALCIUM 9.4 07/22/2021    ALBUMIN 4.30 07/22/2021    AST 25 07/22/2021    ALT 30 07/22/2021     Lab Results   Component Value Date    CHLPL 160 07/22/2021    TRIG 97 07/22/2021    HDL 46 07/22/2021    LDL 96 07/22/2021      Lab Results   Component Value Date    WBC 5.35 07/22/2021    RBC 4.32 07/22/2021    HGB 13.8 07/22/2021    HCT 41.3 07/22/2021    MCV 95.6 07/22/2021     07/22/2021     Lab Results   Component Value Date    TSH 4.390 (H) 07/22/2021     Lab Results   Component Value Date    HGBA1C 5.20 07/16/2020        Procedures       Assessment:      Diagnosis Plan   1. Coronary artery disease involving native coronary artery of native heart with other form of angina pectoris (CMS/HCC)  Currently stable, if CP worsens we will order further testing; continue aspirin and Plavix for DAPT, Imdur daily, and nitroglycerin as needed for angina   2. Essential hypertension  Well controlled; continue bisoprolol 5 mg and losartan 50 mg   3. Familial hypercholesterolemia  Well controlled; continue Repatha   4. Frequent PVCs  Stable and asymptomatic; continue bisoprolol 5 mg      Plan:   It is unclear whether he is experiencing occasional mild angina or atypical pain however he has fairly active lifestyle which is not limited by his chest pain.  He is on appropriate medical therapy.    At this time we recommended continued medical management and explained that if chest pain becomes more frequent or limits him from his desired  activities he should contact us and we will consider further evaluation.     Continue current medications.   FU in 6 MO, sooner as needed.  Thank you for allowing us to participate in the care of your patient.     Scribed for Candy Ribeiro MD by Christie Sauceda. 9/17/2021 10:29 EDT      I, Candy Ribeiro MD, personally performed the services described in this documentation as scribed by the above named individual in my presence, and it is both accurate and complete.  9/17/2021  13:46 EDT        Please note that portions of this note may have been completed with a voice recognition program. Efforts were made to edit the dictations, but occasionally words are mistranscribed.

## 2021-09-17 ENCOUNTER — OFFICE VISIT (OUTPATIENT)
Dept: CARDIOLOGY | Facility: CLINIC | Age: 71
End: 2021-09-17

## 2021-09-17 VITALS
WEIGHT: 172 LBS | OXYGEN SATURATION: 93 % | HEIGHT: 69 IN | BODY MASS INDEX: 25.48 KG/M2 | HEART RATE: 52 BPM | DIASTOLIC BLOOD PRESSURE: 60 MMHG | SYSTOLIC BLOOD PRESSURE: 92 MMHG

## 2021-09-17 DIAGNOSIS — I25.118 CORONARY ARTERY DISEASE INVOLVING NATIVE CORONARY ARTERY OF NATIVE HEART WITH OTHER FORM OF ANGINA PECTORIS (HCC): Primary | ICD-10-CM

## 2021-09-17 DIAGNOSIS — I49.3 FREQUENT PVCS: ICD-10-CM

## 2021-09-17 DIAGNOSIS — E78.01 FAMILIAL HYPERCHOLESTEROLEMIA: ICD-10-CM

## 2021-09-17 DIAGNOSIS — I10 ESSENTIAL HYPERTENSION: ICD-10-CM

## 2021-09-17 PROCEDURE — 99214 OFFICE O/P EST MOD 30 MIN: CPT | Performed by: INTERNAL MEDICINE

## 2021-10-22 RX ORDER — EVOLOCUMAB 140 MG/ML
INJECTION, SOLUTION SUBCUTANEOUS
Qty: 2 ML | Refills: 11 | Status: SHIPPED | OUTPATIENT
Start: 2021-10-22 | End: 2022-03-01 | Stop reason: SDUPTHER

## 2021-11-18 RX ORDER — ACYCLOVIR 400 MG/1
TABLET ORAL
Qty: 90 TABLET | Refills: 2 | Status: SHIPPED | OUTPATIENT
Start: 2021-11-18 | End: 2022-08-25

## 2022-01-24 ENCOUNTER — OFFICE VISIT (OUTPATIENT)
Dept: FAMILY MEDICINE CLINIC | Facility: CLINIC | Age: 72
End: 2022-01-24

## 2022-01-24 ENCOUNTER — LAB (OUTPATIENT)
Dept: LAB | Facility: HOSPITAL | Age: 72
End: 2022-01-24

## 2022-01-24 VITALS
RESPIRATION RATE: 10 BRPM | SYSTOLIC BLOOD PRESSURE: 126 MMHG | DIASTOLIC BLOOD PRESSURE: 88 MMHG | BODY MASS INDEX: 24.34 KG/M2 | HEIGHT: 70 IN | TEMPERATURE: 98.8 F | WEIGHT: 170 LBS | HEART RATE: 57 BPM | OXYGEN SATURATION: 95 %

## 2022-01-24 DIAGNOSIS — E78.01 FAMILIAL HYPERCHOLESTEROLEMIA: ICD-10-CM

## 2022-01-24 DIAGNOSIS — K21.9 GASTROESOPHAGEAL REFLUX DISEASE, UNSPECIFIED WHETHER ESOPHAGITIS PRESENT: ICD-10-CM

## 2022-01-24 DIAGNOSIS — R07.89 OTHER CHEST PAIN: ICD-10-CM

## 2022-01-24 DIAGNOSIS — I10 PRIMARY HYPERTENSION: Primary | ICD-10-CM

## 2022-01-24 LAB
ALBUMIN SERPL-MCNC: 4.4 G/DL (ref 3.5–5.2)
ALBUMIN/GLOB SERPL: 1.7 G/DL
ALP SERPL-CCNC: 67 U/L (ref 39–117)
ALT SERPL W P-5'-P-CCNC: 26 U/L (ref 1–41)
ANION GAP SERPL CALCULATED.3IONS-SCNC: 7.1 MMOL/L (ref 5–15)
AST SERPL-CCNC: 21 U/L (ref 1–40)
BILIRUB SERPL-MCNC: 0.5 MG/DL (ref 0–1.2)
BUN SERPL-MCNC: 17 MG/DL (ref 8–23)
BUN/CREAT SERPL: 14.5 (ref 7–25)
CALCIUM SPEC-SCNC: 9.4 MG/DL (ref 8.6–10.5)
CHLORIDE SERPL-SCNC: 104 MMOL/L (ref 98–107)
CHOLEST SERPL-MCNC: 142 MG/DL (ref 0–200)
CO2 SERPL-SCNC: 26.9 MMOL/L (ref 22–29)
CREAT SERPL-MCNC: 1.17 MG/DL (ref 0.76–1.27)
GFR SERPL CREATININE-BSD FRML MDRD: 61 ML/MIN/1.73
GLOBULIN UR ELPH-MCNC: 2.6 GM/DL
GLUCOSE SERPL-MCNC: 94 MG/DL (ref 65–99)
HDLC SERPL-MCNC: 46 MG/DL (ref 40–60)
LDLC SERPL CALC-MCNC: 78 MG/DL (ref 0–100)
LDLC/HDLC SERPL: 1.67 {RATIO}
POTASSIUM SERPL-SCNC: 4.9 MMOL/L (ref 3.5–5.2)
PROT SERPL-MCNC: 7 G/DL (ref 6–8.5)
SODIUM SERPL-SCNC: 138 MMOL/L (ref 136–145)
TRIGL SERPL-MCNC: 96 MG/DL (ref 0–150)
VLDLC SERPL-MCNC: 18 MG/DL (ref 5–40)

## 2022-01-24 PROCEDURE — 80053 COMPREHEN METABOLIC PANEL: CPT | Performed by: FAMILY MEDICINE

## 2022-01-24 PROCEDURE — 80061 LIPID PANEL: CPT | Performed by: FAMILY MEDICINE

## 2022-01-24 PROCEDURE — 99214 OFFICE O/P EST MOD 30 MIN: CPT | Performed by: FAMILY MEDICINE

## 2022-01-24 PROCEDURE — 93000 ELECTROCARDIOGRAM COMPLETE: CPT | Performed by: FAMILY MEDICINE

## 2022-01-24 RX ORDER — LOSARTAN POTASSIUM 50 MG/1
50 TABLET ORAL NIGHTLY
Qty: 90 TABLET | Refills: 3 | Status: SHIPPED | OUTPATIENT
Start: 2022-01-24 | End: 2023-02-07 | Stop reason: SDUPTHER

## 2022-01-24 RX ORDER — SERTRALINE HYDROCHLORIDE 100 MG/1
100 TABLET, FILM COATED ORAL DAILY
Qty: 90 TABLET | Refills: 1 | Status: SHIPPED | OUTPATIENT
Start: 2022-01-24 | End: 2022-01-24 | Stop reason: SDUPTHER

## 2022-01-24 RX ORDER — LEVOTHYROXINE SODIUM 0.15 MG/1
150 TABLET ORAL DAILY
Qty: 90 TABLET | Refills: 3 | Status: SHIPPED | OUTPATIENT
Start: 2022-01-24 | End: 2023-01-16

## 2022-01-24 RX ORDER — SERTRALINE HYDROCHLORIDE 100 MG/1
100 TABLET, FILM COATED ORAL DAILY
Qty: 90 TABLET | Refills: 1 | Status: SHIPPED | OUTPATIENT
Start: 2022-01-24 | End: 2022-08-25

## 2022-01-24 NOTE — PROGRESS NOTES
"Chief Complaint  f/u on well visit (med refill)    Subjective          Enio Tapia presents to [facility]  Patient having some chest pains at night only last a few seconds no soa some anxiety    Hypertension  This is a chronic problem. The current episode started more than 1 year ago. The problem is unchanged. The problem is controlled. Pertinent negatives include no palpitations or shortness of breath. Risk factors: known cad. Current antihypertension treatment includes calcium channel blockers, beta blockers, angiotensin blockers and lifestyle changes. The current treatment provides significant improvement. There are no compliance problems.  Hypertensive end-organ damage includes CAD/MI. There is no history of angina or kidney disease.   Hyperlipidemia  This is a chronic problem. The current episode started more than 1 year ago. The problem is controlled. Factors aggravating his hyperlipidemia include fatty foods. Pertinent negatives include no shortness of breath. Treatments tried: evolocumab injections  The current treatment provides significant improvement of lipids. There are no compliance problems.  Risk factors for coronary artery disease include dyslipidemia, hypertension and male sex.       Objective   Vital Signs:   /88   Pulse 57   Temp 98.8 °F (37.1 °C)   Resp 10   Ht 177.8 cm (70\")   Wt 77.1 kg (170 lb)   SpO2 95%   BMI 24.39 kg/m²     Physical Exam  Vitals and nursing note reviewed.   Constitutional:       Appearance: Normal appearance. He is well-developed.   HENT:      Head: Normocephalic and atraumatic.   Eyes:      General: No scleral icterus.     Conjunctiva/sclera: Conjunctivae normal.      Pupils: Pupils are equal, round, and reactive to light.   Neck:      Thyroid: No thyromegaly.      Vascular: No carotid bruit.   Cardiovascular:      Rate and Rhythm: Normal rate and regular rhythm.      Pulses: Normal pulses.      Heart sounds: Normal heart " sounds.   Pulmonary:      Effort: Pulmonary effort is normal.      Breath sounds: Normal breath sounds.   Musculoskeletal:         General: Normal range of motion.      Cervical back: Neck supple.      Right lower leg: No edema.      Left lower leg: No edema.   Skin:     General: Skin is warm and dry.      Findings: No rash.   Neurological:      General: No focal deficit present.      Mental Status: He is alert and oriented to person, place, and time.      Comments: No focal deficits no lateralizing signs   Psychiatric:         Behavior: Behavior is cooperative.        Result Review :                 Assessment and Plan    Diagnoses and all orders for this visit:    1. Primary hypertension (Primary)  -     losartan (COZAAR) 50 MG tablet; Take 1 tablet by mouth Every Night.  Dispense: 90 tablet; Refill: 3  -     ECG 12 Lead    2. Familial hypercholesterolemia  -     Comprehensive Metabolic Panel; Future  -     Lipid Panel; Future  -     Comprehensive Metabolic Panel  -     Lipid Panel    3. Other chest pain    Other orders  -     levothyroxine (SYNTHROID, LEVOTHROID) 150 MCG tablet; Take 1 tablet by mouth Daily.  Dispense: 90 tablet; Refill: 3  -     sertraline (ZOLOFT) 100 MG tablet; Take 1 tablet by mouth Daily.  Dispense: 90 tablet; Refill: 1        ECG 12 Lead    Date/Time: 1/24/2022 5:09 PM  Performed by: Troy Mckay MD  Authorized by: Troy Mckay MD   Comparison: compared with previous ECG   Similar to previous ECG  Rhythm: sinus rhythm  Rate: bradycardic  BPM: 53  Conduction: conduction normal  QRS axis: normal  Other findings: poor R wave progression    Clinical impression: abnormal EKG  Comments: No significant changes          Take pepcid qhs and prn, go to er if pain persists longer than 5 minutes take a ntg    Follow Up   Return in about 6 months (around 7/24/2022) for Medicare Wellness, fasting appt.  Patient was given instructions and counseling regarding his condition or for health  maintenance advice. Please see specific information pulled into the AVS if appropriate.

## 2022-03-01 RX ORDER — EVOLOCUMAB 140 MG/ML
140 INJECTION, SOLUTION SUBCUTANEOUS
Qty: 2 ML | Refills: 11 | Status: SHIPPED | OUTPATIENT
Start: 2022-03-01 | End: 2022-03-22 | Stop reason: SDUPTHER

## 2022-03-02 RX ORDER — BISOPROLOL FUMARATE 5 MG/1
TABLET, FILM COATED ORAL
Qty: 90 TABLET | Refills: 3 | Status: SHIPPED | OUTPATIENT
Start: 2022-03-02 | End: 2022-03-25 | Stop reason: SDUPTHER

## 2022-03-22 RX ORDER — CLOPIDOGREL BISULFATE 75 MG/1
TABLET ORAL
Qty: 90 TABLET | Refills: 3 | Status: SHIPPED | OUTPATIENT
Start: 2022-03-22 | End: 2023-03-15

## 2022-03-22 RX ORDER — EVOLOCUMAB 140 MG/ML
140 INJECTION, SOLUTION SUBCUTANEOUS
Qty: 2 ML | Refills: 11 | Status: SHIPPED | OUTPATIENT
Start: 2022-03-22

## 2022-03-25 ENCOUNTER — OFFICE VISIT (OUTPATIENT)
Dept: CARDIOLOGY | Facility: CLINIC | Age: 72
End: 2022-03-25

## 2022-03-25 VITALS
DIASTOLIC BLOOD PRESSURE: 72 MMHG | OXYGEN SATURATION: 96 % | BODY MASS INDEX: 24.77 KG/M2 | HEART RATE: 59 BPM | WEIGHT: 173 LBS | SYSTOLIC BLOOD PRESSURE: 122 MMHG | HEIGHT: 70 IN

## 2022-03-25 DIAGNOSIS — I10 ESSENTIAL HYPERTENSION: ICD-10-CM

## 2022-03-25 DIAGNOSIS — E78.01 FAMILIAL HYPERCHOLESTEROLEMIA: ICD-10-CM

## 2022-03-25 DIAGNOSIS — I25.10 CORONARY ARTERY DISEASE INVOLVING NATIVE CORONARY ARTERY OF NATIVE HEART WITHOUT ANGINA PECTORIS: Primary | ICD-10-CM

## 2022-03-25 DIAGNOSIS — I49.3 FREQUENT PVCS: ICD-10-CM

## 2022-03-25 PROCEDURE — 99214 OFFICE O/P EST MOD 30 MIN: CPT | Performed by: INTERNAL MEDICINE

## 2022-03-25 RX ORDER — BISOPROLOL FUMARATE 5 MG/1
5 TABLET, FILM COATED ORAL DAILY
Qty: 90 TABLET | Refills: 3 | Status: SHIPPED | OUTPATIENT
Start: 2022-03-25 | End: 2023-03-22

## 2022-03-25 NOTE — PROGRESS NOTES
"[facility]    Encounter Date: 2022    Patient ID: Enio Tapia is a 71 y.o. male.  : 1950     PCP: Troy Mckay MD       Chief Complaint: Coronary artery disease involving native coronary artery of      PROBLEM LIST:  1. Coronary artery disease  a. As/P3 0.5 x 32 Taxus JOHANA mid RCA lesion 10/15/04  b. Normal stress echo 2010  c. Echo 2010 showed normal EF of 60% with trace TR and MR.  d. [facility] 3/31/17For unstable angina: Diffuse 70% stenosis of mid LAD noted.  Stented with Xience 3.0 x 33 JOHANA reducing stenosis to 0%.  Also 70% discrete stenosis in proximal first diagonal stented with Xience Alpine 2.25 x 15 JOHANA reducing stenosis to 0%.  Previous stenting proximal to mid RCA widely patent. Normal LVEF.  e. Echo 3/31/17: EF 55%.  Mild LVH noted.  f. [facility], 2020: A small-to-medium-sized infarct located in the inferior wall and septal wall with mild valarie-infarct ischemia. The test is remarkable for brief \"chest burning\" which resolved in early recovery. EF 71%.  g. [facility], 2020: EF 60%. Severe disease of the distal RCA continuing into the proximal PDA; stents reducing the 80% stenosis to 0%. Moderate disease of the proximal RCA, stented with 4.0 x 19 mm JOAHNA and reduced to 0%. 80% stenosis of the PLV branch of the RCA reduced to less than 40%. Nonobstructive 40% stenosis of the proximal circumflex.   2. PVCs:  a. 24h Holter, 2018: Min HR 61, mean 70, max 108. 25,091 PVCs, mostly bigeminy.  b. PVC ablation, 2018 - 1. Successful catheter mapping ablation of 2 ventricular foci. 2. Induction of ectopy was difficult. Less than 10 ectopic beats were able to be induced over a 2 hour time period.  c. 48h Holter, 2019: 92135 PVCs, 11.8% burden  3. Hypertension  4. Hyperlipidemia  5. Hypothyroidism  6. Anxiety disorder  7. Glaucoma  8. Osteoarthritis  9. GRABIEL with CPAP compliance.  10. History of horse bite 2018 on the right side of the " chest, with severe chest trauma.    History of Present Illness  Patient presents today for a 6 month follow-up with a history of coronary artery disease, frequent PVCs, and cardiac risk factors. Since last visit, the patient has been doing well overall from a cardiovascular standpoint. He's had issues getting Repatha covered by insurance but is still taking it. He would like to make sure losartan is appropriate treatment since it was recalled in the past. Patient denies chest pain, shortness of breath, palpitations, edema, dizziness, and syncope.     Allergies   Allergen Reactions   • Pravastatin Myalgia   • Statins Myalgia     ESPECIALLY LIPITOR         Current Outpatient Medications:   •  acyclovir (ZOVIRAX) 400 MG tablet, TAKE ONE TABLET BY MOUTH DAILY, Disp: 90 tablet, Rfl: 2  •  aspirin 81 MG EC tablet, Take 81 mg by mouth Every Night., Disp: , Rfl:   •  bisoprolol (ZEBeta) 5 MG tablet, Take 1 tablet by mouth Daily., Disp: 90 tablet, Rfl: 3  •  clopidogrel (PLAVIX) 75 MG tablet, TAKE ONE TABLET BY MOUTH DAILY, Disp: 90 tablet, Rfl: 3  •  Evolocumab (Repatha SureClick) solution auto-injector SureClick injection, Inject 1 mL under the skin into the appropriate area as directed Every 14 (Fourteen) Days., Disp: 2 mL, Rfl: 11  •  isosorbide mononitrate (IMDUR) 60 MG 24 hr tablet, Take 1 tablet by mouth Every Morning., Disp: 90 tablet, Rfl: 3  •  levothyroxine (SYNTHROID, LEVOTHROID) 150 MCG tablet, Take 1 tablet by mouth Daily., Disp: 90 tablet, Rfl: 3  •  losartan (COZAAR) 50 MG tablet, Take 1 tablet by mouth Every Night., Disp: 90 tablet, Rfl: 3  •  Multiple Vitamins-Minerals (CENTRUM SILVER PO), Take 1 tablet by mouth Daily., Disp: , Rfl:   •  nitroglycerin (NITROSTAT) 0.4 MG SL tablet, 1 under the tongue as needed for angina, may repeat q5mins for up three doses, Disp: 100 tablet, Rfl: 11  •  sertraline (ZOLOFT) 100 MG tablet, Take 1 tablet by mouth Daily., Disp: 90 tablet, Rfl: 1  •  travoprost, BAK free,  "(TRAVATAN) 0.004 % solution ophthalmic solution, Administer 1 drop to both eyes Every Evening. in affected eye(s), Disp: , Rfl:   •  zolpidem (Ambien) 5 MG tablet, Take 1 tablet by mouth At Night As Needed for Sleep., Disp: 30 tablet, Rfl: 0    The following portions of the patient's history were reviewed and updated as appropriate: allergies, current medications, past family history, past medical history, past social history, past surgical history and problem list.    ROS  Review of Systems   Constitution: Negative for chills, fever, fatigue, generalized weakness.   Cardiovascular: Negative for chest pain, dyspnea on exertion, leg swelling, palpitations, orthopnea, and syncope.   Respiratory: Negative for cough, shortness of breath, and wheezing.  HENT: Negative for ear pain, nosebleeds, and tinnitus.  Gastrointestinal: Negative for abdominal pain, constipation, diarrhea, nausea and vomiting.   Genitourinary: No urinary symptoms.  Musculoskeletal: Negative for muscle cramps.  Neurological: Negative for dizziness, headaches, loss of balance, numbness, and symptoms of stroke.  Psychiatric: Normal mental status.     All other systems reviewed and are negative.        Objective:     /72 (BP Location: Right arm, Patient Position: Sitting)   Pulse 59   Ht 177.8 cm (70\")   Wt 78.5 kg (173 lb)   SpO2 96%   BMI 24.82 kg/m²      Physical Exam  Constitutional: Patient appears well-developed and well-nourished.   HENT: HEENT exam unremarkable.   Neck: Neck supple. No JVD present. No carotid bruits.   Cardiovascular: Normal rate, regular rhythm and normal heart sounds. No murmur heard.   2+ symmetric pulses.   Pulmonary/Chest: Breath sounds normal. Does not exhibit tenderness.   Abdominal: Abdomen benign.   Musculoskeletal: Does not exhibit edema.   Neurological: Neurological exam unremarkable.   Vitals reviewed.    Data Review:   Lab Results   Component Value Date    GLUCOSE 94 01/24/2022    BUN 17 01/24/2022    " CREATININE 1.17 01/24/2022    EGFRIFNONA 61 01/24/2022    EGFRIFAFRI 92 07/22/2021    BCR 14.5 01/24/2022    K 4.9 01/24/2022    CO2 26.9 01/24/2022    CALCIUM 9.4 01/24/2022    ALBUMIN 4.40 01/24/2022    AST 21 01/24/2022    ALT 26 01/24/2022     Lab Results   Component Value Date    CHOL 142 01/24/2022    CHLPL 160 07/22/2021    TRIG 96 01/24/2022    HDL 46 01/24/2022    LDL 78 01/24/2022      Lab Results   Component Value Date    WBC 5.35 07/22/2021    RBC 4.32 07/22/2021    HGB 13.8 07/22/2021    HCT 41.3 07/22/2021    MCV 95.6 07/22/2021     07/22/2021     Lab Results   Component Value Date    TSH 4.390 (H) 07/22/2021        Procedures       Assessment:      Diagnosis Plan   1. Coronary artery disease involving native coronary artery of native heart without angina pectoris  Stable and asymptomatic; continue aspirin 81 mg and Plavix 75 mg for DAPT and Imdur 60 mg    2. Frequent PVCs  Stable and asymptomatic; continue bisoprolol 5 mg    3. Essential hypertension  Well controlled; continue bisoprolol 5 mg and losartan 50 mg.    4. Familial hypercholesterolemia  Well controlled; continue Repatha and encouraged him to call the office should his insurance not cover it      Plan:   Stable cardiac status.   Reassured him that at present losartan has been made available as it is felt to be a safe/effective cardiac medication and is no longer recalled.   Continue current medications.   FU in 12 MO, sooner as needed.  Thank you for allowing us to participate in the care of your patient.     Scribed for Candy Ribeiro MD by Christie Sauceda. 3/25/2022 13:23 EDT      I, Candy Ribeiro MD, personally performed the services described in this documentation as scribed by the above named individual in my presence, and it is both accurate and complete.  3/25/2022  13:54 EDT        Part of this note may be an electronic transcription/translation of spoken language to printed text using the Dragon Dictation System.

## 2022-05-16 RX ORDER — ISOSORBIDE MONONITRATE 60 MG/1
TABLET, EXTENDED RELEASE ORAL
Qty: 90 TABLET | Refills: 3 | Status: SHIPPED | OUTPATIENT
Start: 2022-05-16

## 2022-05-27 DIAGNOSIS — F51.02 ADJUSTMENT INSOMNIA: ICD-10-CM

## 2022-05-27 NOTE — TELEPHONE ENCOUNTER
Last Office Visit: 01/24/22  Next Office Visit:07/27/22  Last Refill Date:07/22/21  Quantity:30  Refills:0

## 2022-05-30 RX ORDER — ZOLPIDEM TARTRATE 5 MG/1
TABLET ORAL
Qty: 20 TABLET | Refills: 0 | Status: SHIPPED | OUTPATIENT
Start: 2022-05-30 | End: 2023-02-07 | Stop reason: SDUPTHER

## 2022-08-25 RX ORDER — ACYCLOVIR 400 MG/1
TABLET ORAL
Qty: 90 TABLET | Refills: 0 | Status: SHIPPED | OUTPATIENT
Start: 2022-08-25 | End: 2022-11-21

## 2022-08-25 RX ORDER — SERTRALINE HYDROCHLORIDE 100 MG/1
TABLET, FILM COATED ORAL
Qty: 90 TABLET | Refills: 0 | Status: SHIPPED | OUTPATIENT
Start: 2022-08-25 | End: 2022-11-21

## 2022-08-25 NOTE — TELEPHONE ENCOUNTER
Rx Refill Note  Requested Prescriptions     Pending Prescriptions Disp Refills   • acyclovir (ZOVIRAX) 400 MG tablet [Pharmacy Med Name: ACYCLOVIR 400 MG TABLET] 90 tablet 2     Sig: TAKE ONE TABLET BY MOUTH DAILY   • sertraline (ZOLOFT) 100 MG tablet [Pharmacy Med Name: SERTRALINE  MG TABLET] 90 tablet 1     Sig: TAKE ONE TABLET BY MOUTH DAILY      Last office visit with prescribing clinician: 1/24/2022      Next office visit with prescribing clinician: Visit date not found            Alireza Crowley MA  08/25/22, 07:36 EDT

## 2022-11-21 RX ORDER — ACYCLOVIR 400 MG/1
TABLET ORAL
Qty: 30 TABLET | Refills: 0 | Status: SHIPPED | OUTPATIENT
Start: 2022-11-21 | End: 2022-12-19

## 2022-11-21 RX ORDER — SERTRALINE HYDROCHLORIDE 100 MG/1
TABLET, FILM COATED ORAL
Qty: 30 TABLET | Refills: 0 | Status: SHIPPED | OUTPATIENT
Start: 2022-11-21 | End: 2022-12-19

## 2022-11-21 NOTE — TELEPHONE ENCOUNTER
Rx Refill Note  Requested Prescriptions     Pending Prescriptions Disp Refills   • sertraline (ZOLOFT) 100 MG tablet [Pharmacy Med Name: SERTRALINE  MG TABLET] 90 tablet 0     Sig: TAKE ONE TABLET BY MOUTH DAILY   • acyclovir (ZOVIRAX) 400 MG tablet [Pharmacy Med Name: ACYCLOVIR 400 MG TABLET] 90 tablet 0     Sig: TAKE ONE TABLET BY MOUTH DAILY      Last office visit with prescribing clinician: 1/24/2022      Next office visit with prescribing clinician: Visit date not found            Alireza Crowley MA  11/21/22, 07:31 EST

## 2022-12-19 RX ORDER — SERTRALINE HYDROCHLORIDE 100 MG/1
TABLET, FILM COATED ORAL
Qty: 30 TABLET | Refills: 0 | Status: SHIPPED | OUTPATIENT
Start: 2022-12-19 | End: 2023-02-07 | Stop reason: SDUPTHER

## 2022-12-19 RX ORDER — ACYCLOVIR 400 MG/1
TABLET ORAL
Qty: 30 TABLET | Refills: 0 | Status: SHIPPED | OUTPATIENT
Start: 2022-12-19 | End: 2023-02-07 | Stop reason: SDUPTHER

## 2022-12-19 NOTE — TELEPHONE ENCOUNTER
Rx Refill Note  Requested Prescriptions     Pending Prescriptions Disp Refills   • acyclovir (ZOVIRAX) 400 MG tablet [Pharmacy Med Name: ACYCLOVIR 400 MG TABLET] 30 tablet 0     Sig: TAKE ONE TABLET BY MOUTH DAILY   • sertraline (ZOLOFT) 100 MG tablet [Pharmacy Med Name: SERTRALINE  MG TABLET] 30 tablet 0     Sig: TAKE ONE TABLET BY MOUTH DAILY, PATIENT IS DUE FOR A FOLLOW UP VISIT      Last office visit with prescribing clinician: 1/24/2022   Last telemedicine visit with prescribing clinician: Visit date not found   Next office visit with prescribing clinician: Visit date not found                         Would you like a call back once the refill request has been completed: [] Yes [] No    If the office needs to give you a call back, can they leave a voicemail: [] Yes [] No    Sylvester Moya MA  12/19/22, 08:23 EST

## 2023-01-03 ENCOUNTER — TELEPHONE (OUTPATIENT)
Dept: CARDIOLOGY | Facility: CLINIC | Age: 73
End: 2023-01-03
Payer: MEDICARE

## 2023-01-16 RX ORDER — LEVOTHYROXINE SODIUM 0.15 MG/1
TABLET ORAL
Qty: 30 TABLET | Refills: 3 | Status: SHIPPED | OUTPATIENT
Start: 2023-01-16

## 2023-01-16 NOTE — TELEPHONE ENCOUNTER
Rx Refill Note  Requested Prescriptions     Pending Prescriptions Disp Refills   • levothyroxine (SYNTHROID, LEVOTHROID) 150 MCG tablet [Pharmacy Med Name: LEVOTHYROXINE 150 MCG TABLET] 90 tablet 3     Sig: TAKE ONE TABLET BY MOUTH DAILY      Last office visit with prescribing clinician: 1/24/2022   Last telemedicine visit with prescribing clinician: Visit date not found   Next office visit with prescribing clinician: Visit date not found                         Would you like a call back once the refill request has been completed: [] Yes [] No    If the office needs to give you a call back, can they leave a voicemail: [] Yes [] No    Dipti Trujillo  01/16/23, 09:23 EST

## 2023-02-01 ENCOUNTER — OFFICE VISIT (OUTPATIENT)
Dept: FAMILY MEDICINE CLINIC | Facility: CLINIC | Age: 73
End: 2023-02-01
Payer: MEDICARE

## 2023-02-01 ENCOUNTER — LAB (OUTPATIENT)
Dept: LAB | Facility: HOSPITAL | Age: 73
End: 2023-02-01
Payer: MEDICARE

## 2023-02-01 VITALS
DIASTOLIC BLOOD PRESSURE: 72 MMHG | SYSTOLIC BLOOD PRESSURE: 126 MMHG | OXYGEN SATURATION: 96 % | BODY MASS INDEX: 24.88 KG/M2 | HEART RATE: 55 BPM | WEIGHT: 173.8 LBS | HEIGHT: 70 IN | TEMPERATURE: 98.6 F

## 2023-02-01 DIAGNOSIS — F51.02 ADJUSTMENT INSOMNIA: ICD-10-CM

## 2023-02-01 DIAGNOSIS — Z00.00 MEDICARE ANNUAL WELLNESS VISIT, SUBSEQUENT: Primary | ICD-10-CM

## 2023-02-01 DIAGNOSIS — I10 PRIMARY HYPERTENSION: ICD-10-CM

## 2023-02-01 DIAGNOSIS — I25.110 CORONARY ARTERY DISEASE INVOLVING NATIVE CORONARY ARTERY OF NATIVE HEART WITH UNSTABLE ANGINA PECTORIS: ICD-10-CM

## 2023-02-01 DIAGNOSIS — E78.01 FAMILIAL HYPERCHOLESTEROLEMIA: ICD-10-CM

## 2023-02-01 DIAGNOSIS — E03.9 ACQUIRED HYPOTHYROIDISM: ICD-10-CM

## 2023-02-01 DIAGNOSIS — Z12.5 PROSTATE CANCER SCREENING: ICD-10-CM

## 2023-02-01 DIAGNOSIS — Z00.00 MEDICARE ANNUAL WELLNESS VISIT, SUBSEQUENT: ICD-10-CM

## 2023-02-01 DIAGNOSIS — D49.2 ATYPICAL SQUAMOPROLIFERATIVE SKIN LESION: ICD-10-CM

## 2023-02-01 PROBLEM — S20.211A HEMATOMA OF RIGHT CHEST WALL: Status: RESOLVED | Noted: 2018-05-21 | Resolved: 2023-02-01

## 2023-02-01 PROBLEM — I49.3 FREQUENT PVCS: Status: RESOLVED | Noted: 2018-07-09 | Resolved: 2023-02-01

## 2023-02-01 PROBLEM — I20.0 UNSTABLE ANGINA: Status: RESOLVED | Noted: 2020-07-13 | Resolved: 2023-02-01

## 2023-02-01 PROBLEM — L03.114 CELLULITIS OF LEFT HAND: Status: RESOLVED | Noted: 2020-09-21 | Resolved: 2023-02-01

## 2023-02-01 PROBLEM — S20.212A RIB CONTUSION, LEFT, INITIAL ENCOUNTER: Status: RESOLVED | Noted: 2019-03-09 | Resolved: 2023-02-01

## 2023-02-01 PROCEDURE — 1126F AMNT PAIN NOTED NONE PRSNT: CPT | Performed by: FAMILY MEDICINE

## 2023-02-01 PROCEDURE — 96160 PT-FOCUSED HLTH RISK ASSMT: CPT | Performed by: FAMILY MEDICINE

## 2023-02-01 PROCEDURE — G0439 PPPS, SUBSEQ VISIT: HCPCS | Performed by: FAMILY MEDICINE

## 2023-02-01 PROCEDURE — 93000 ELECTROCARDIOGRAM COMPLETE: CPT | Performed by: FAMILY MEDICINE

## 2023-02-01 PROCEDURE — 1159F MED LIST DOCD IN RCRD: CPT | Performed by: FAMILY MEDICINE

## 2023-02-01 PROCEDURE — 1170F FXNL STATUS ASSESSED: CPT | Performed by: FAMILY MEDICINE

## 2023-02-01 RX ORDER — ZOLPIDEM TARTRATE 5 MG/1
5 TABLET ORAL NIGHTLY PRN
Qty: 20 TABLET | Refills: 0 | Status: CANCELLED | OUTPATIENT
Start: 2023-02-01

## 2023-02-01 RX ORDER — LOSARTAN POTASSIUM 50 MG/1
50 TABLET ORAL NIGHTLY
Qty: 90 TABLET | Refills: 3 | Status: CANCELLED | OUTPATIENT
Start: 2023-02-01

## 2023-02-01 NOTE — PROGRESS NOTES
The ABCs of the Annual Wellness Visit  Subsequent Medicare Wellness Visit    Chief Complaint   Patient presents with   • Medicare Wellness-subsequent      Subjective    History of Present Illness:  Enio Tapia is a 72 y.o. male who presents for a Subsequent Medicare Wellness Visit.      The following portions of the patient's history were reviewed and   updated as appropriate: allergies, current medications, past family history, past medical history, past social history, past surgical history and problem list.     Compared to one year ago, the patient feels his physical   health is the same.    Compared to one year ago, the patient feels his mental   health is the same.    Recent Hospitalizations:  He was not admitted to the hospital during the last year.       Current Medical Providers:  Patient Care Team:  Troy Mckay MD as PCP - General (Family Medicine)  Troy Mckay MD as PCP - Family Medicine  Candy Ribeiro MD as Consulting Physician (Cardiology)    Outpatient Medications Prior to Visit   Medication Sig Dispense Refill   • acyclovir (ZOVIRAX) 400 MG tablet TAKE ONE TABLET BY MOUTH DAILY 30 tablet 0   • aspirin 81 MG EC tablet Take 81 mg by mouth Every Night.     • bisoprolol (ZEBeta) 5 MG tablet Take 1 tablet by mouth Daily. 90 tablet 3   • clopidogrel (PLAVIX) 75 MG tablet TAKE ONE TABLET BY MOUTH DAILY 90 tablet 3   • Evolocumab (Repatha SureClick) solution auto-injector SureClick injection Inject 1 mL under the skin into the appropriate area as directed Every 14 (Fourteen) Days. 2 mL 11   • isosorbide mononitrate (IMDUR) 60 MG 24 hr tablet TAKE ONE TABLET BY MOUTH EVERY MORNING 90 tablet 3   • levothyroxine (SYNTHROID, LEVOTHROID) 150 MCG tablet TAKE ONE TABLET BY MOUTH DAILY 30 tablet 3   • losartan (COZAAR) 50 MG tablet Take 1 tablet by mouth Every Night. 90 tablet 3   • Multiple Vitamins-Minerals (CENTRUM SILVER PO) Take 1 tablet by mouth Daily.     • nitroglycerin (NITROSTAT) 0.4 MG SL  tablet 1 under the tongue as needed for angina, may repeat q5mins for up three doses 100 tablet 11   • sertraline (ZOLOFT) 100 MG tablet TAKE ONE TABLET BY MOUTH DAILY, PATIENT IS DUE FOR A FOLLOW UP VISIT 30 tablet 0   • travoprost, BAK free, (TRAVATAN) 0.004 % solution ophthalmic solution Administer 1 drop to both eyes Every Evening. in affected eye(s)     • zolpidem (AMBIEN) 5 MG tablet TAKE ONE TABLET BY MOUTH ONCE NIGHTLY AS NEEDED FOR SLEEP 20 tablet 0     No facility-administered medications prior to visit.       No opioid medication identified on active medication list. I have reviewed chart for other potential  high risk medication/s and harmful drug interactions in the elderly.          Aspirin is on active medication list. Aspirin use is indicated based on review of current medical condition/s. Pros and cons of this therapy have been discussed today. Benefits of this medication outweigh potential harm.  Patient has been encouraged to continue taking this medication.  .      Patient Active Problem List   Diagnosis   • Hyperlipidemia   • Hypertension   • Acquired hypothyroidism   • Irritable bowel syndrome   • Sleep apnea   • Generalized anxiety disorder   • Inguinal hernia   • Herpes simplex infection   • Coronary artery disease involving native coronary artery of native heart with unstable angina pectoris (HCC)   • Frequent unifocal PVCs   • Stable angina pectoris (HCC)   • Abnormal stress test     Advance Care Planning   Advance Directive is not on file.  ACP discussion was held with the patient during this visit. Patient has an advance directive (not in EMR), copy requested.    Review of Systems   Constitutional: Negative for activity change and unexpected weight change.   HENT: Negative for congestion and sore throat.    Eyes: Negative for pain and visual disturbance.   Respiratory: Negative for cough and shortness of breath.    Cardiovascular: Negative for chest pain, palpitations and leg swelling.     "    Occasionally wakes up with angina particularly if he forgets to take his Imdur   Gastrointestinal: Negative for diarrhea, nausea and vomiting.        IBS symptoms are stable   Endocrine: Negative for cold intolerance and heat intolerance.   Genitourinary: Negative for dysuria and hematuria.        Nocturia x1   Musculoskeletal: Positive for arthralgias. Negative for joint swelling.        Primarily in the hands occasionally in the knees   Skin: Negative for color change and rash.        Skin lesions in the scalp or a few other spots on the thighs would like to have checked by dermatology   Allergic/Immunologic: Negative for environmental allergies and food allergies.   Neurological: Negative for syncope and headaches.   Hematological: Negative for adenopathy. Does not bruise/bleed easily.   Psychiatric/Behavioral: Negative for dysphoric mood and sleep disturbance. The patient is not nervous/anxious.          Objective       Vitals:    02/01/23 0912   BP: 126/72   Pulse: 55   Temp: 98.6 °F (37 °C)   SpO2: 96%   Weight: 78.8 kg (173 lb 12.8 oz)   Height: 177.8 cm (70\")   PainSc: 0-No pain     BMI Readings from Last 1 Encounters:   02/01/23 24.94 kg/m²   BMI is within normal parameters. No follow-up required.    Does the patient have evidence of cognitive impairment? No    Physical Exam              HEALTH RISK ASSESSMENT    Smoking Status:  Social History     Tobacco Use   Smoking Status Never   Smokeless Tobacco Former   • Types: Chew, Snuff   • Quit date: 01/2002     Alcohol Consumption:  Social History     Substance and Sexual Activity   Alcohol Use Yes   • Alcohol/week: 1.0 standard drink   • Types: 1 Cans of beer per week    Comment: occas     Fall Risk Screen:    VIMALADI Fall Risk Assessment was completed, and patient is at LOW risk for falls.Assessment completed on:2/1/2023    Depression Screening:  PHQ-2/PHQ-9 Depression Screening 2/1/2023   Retired PHQ-9 Total Score -   Retired Total Score -   Little " Interest or Pleasure in Doing Things 0-->not at all   Feeling Down, Depressed or Hopeless 0-->not at all   PHQ-9: Brief Depression Severity Measure Score 0       Health Habits and Functional and Cognitive Screening:  Functional & Cognitive Status 2/1/2023   Do you have difficulty preparing food and eating? No   Do you have difficulty bathing yourself, getting dressed or grooming yourself? No   Do you have difficulty using the toilet? No   Do you have difficulty moving around from place to place? No   Do you have trouble with steps or getting out of a bed or a chair? No   Current Diet Well Balanced Diet   Dental Exam Up to date   Eye Exam Up to date   Exercise (times per week) Other   Current Exercises Include Other   Do you need help using the phone?  No   Are you deaf or do you have serious difficulty hearing?  No   Do you need help with transportation? No   Do you need help shopping? No   Do you need help preparing meals?  No   Do you need help with housework?  No   Do you need help with laundry? No   Do you need help taking your medications? No   Do you need help managing money? No   Do you ever drive or ride in a car without wearing a seat belt? No       Age-appropriate Screening Schedule:  Refer to the list below for future screening recommendations based on patient's age, sex and/or medical conditions. Orders for these recommended tests are listed in the plan section. The patient has been provided with a written plan.    Health Maintenance   Topic Date Due   • ZOSTER VACCINE (2 of 2) 02/07/2013   • LIPID PANEL  01/24/2023   • TDAP/TD VACCINES (3 - Td or Tdap) 05/19/2028   • INFLUENZA VACCINE  Completed              Assessment & Plan     CMS Preventative Services Quick Reference  Risk Factors Identified During Encounter  Immunizations Discussed/Encouraged: Shingrix  The above risks/problems have been discussed with the patient.  Follow up actions/plans if indicated are seen below in the Assessment/Plan  Section.  Pertinent information has been shared with the patient in the After Visit Summary.    Diagnoses and all orders for this visit:    1. Medicare annual wellness visit, subsequent (Primary)  -     Urinalysis With Culture If Indicated -; Future    2. Adjustment insomnia    3. Primary hypertension  -     Comprehensive Metabolic Panel; Future  -     CBC (No Diff); Future    4. Familial hypercholesterolemia  -     Lipid Panel; Future    5. Acquired hypothyroidism  -     TSH; Future    6. Prostate cancer screening  -     PSA Screen; Future    7. Coronary artery disease involving native coronary artery of native heart with unstable angina pectoris (HCC)  -     ECG 12 Lead    see form    ECG 12 Lead    Date/Time: 2/1/2023 4:31 PM  Performed by: Troy Mckay MD  Authorized by: Troy Mckay MD   Comparison: compared with previous ECG   Similar to previous ECG  Rhythm: sinus rhythm  Rate: bradycardic  BPM: 50  Conduction: conduction normal  QRS axis: normal  Other findings: non-specific ST-T wave changes    Clinical impression: non-specific ECG            Follow Up:   Return if symptoms worsen or fail to improve.     An After Visit Summary and PPPS were given to the patient.

## 2023-02-04 LAB
ALBUMIN SERPL-MCNC: 4.3 G/DL (ref 3.5–5.2)
ALBUMIN/GLOB SERPL: 2 G/DL
ALP SERPL-CCNC: 68 U/L (ref 39–117)
ALT SERPL-CCNC: 25 U/L (ref 1–41)
APPEARANCE UR: CLEAR
AST SERPL-CCNC: 21 U/L (ref 1–40)
BACTERIA #/AREA URNS HPF: NORMAL /HPF
BACTERIA UR CULT: ABNORMAL
BACTERIA UR CULT: ABNORMAL
BILIRUB SERPL-MCNC: 0.4 MG/DL (ref 0–1.2)
BILIRUB UR QL STRIP: NEGATIVE
BUN SERPL-MCNC: 19 MG/DL (ref 8–23)
BUN/CREAT SERPL: 17.3 (ref 7–25)
CALCIUM SERPL-MCNC: 9.7 MG/DL (ref 8.6–10.5)
CASTS URNS QL MICRO: NORMAL /LPF
CHLORIDE SERPL-SCNC: 105 MMOL/L (ref 98–107)
CHOLEST SERPL-MCNC: 169 MG/DL (ref 0–200)
CO2 SERPL-SCNC: 27.9 MMOL/L (ref 22–29)
COLOR UR: YELLOW
CREAT SERPL-MCNC: 1.1 MG/DL (ref 0.76–1.27)
EGFRCR SERPLBLD CKD-EPI 2021: 70.9 ML/MIN/1.73
EPI CELLS #/AREA URNS HPF: NORMAL /HPF (ref 0–10)
ERYTHROCYTE [DISTWIDTH] IN BLOOD BY AUTOMATED COUNT: 13.1 % (ref 12.3–15.4)
GLOBULIN SER CALC-MCNC: 2.1 GM/DL
GLUCOSE SERPL-MCNC: 100 MG/DL (ref 65–99)
GLUCOSE UR QL STRIP: NEGATIVE
HCT VFR BLD AUTO: 42.1 % (ref 37.5–51)
HDLC SERPL-MCNC: 46 MG/DL (ref 40–60)
HGB BLD-MCNC: 14 G/DL (ref 13–17.7)
HGB UR QL STRIP: NEGATIVE
KETONES UR QL STRIP: NEGATIVE
LDLC SERPL CALC-MCNC: 93 MG/DL (ref 0–100)
LEUKOCYTE ESTERASE UR QL STRIP: ABNORMAL
MCH RBC QN AUTO: 31.5 PG (ref 26.6–33)
MCHC RBC AUTO-ENTMCNC: 33.3 G/DL (ref 31.5–35.7)
MCV RBC AUTO: 94.8 FL (ref 79–97)
MICRO URNS: ABNORMAL
NITRITE UR QL STRIP: NEGATIVE
PH UR STRIP: 5.5 [PH] (ref 5–7.5)
PLATELET # BLD AUTO: 171 10*3/MM3 (ref 140–450)
POTASSIUM SERPL-SCNC: 4.9 MMOL/L (ref 3.5–5.2)
PROT SERPL-MCNC: 6.4 G/DL (ref 6–8.5)
PROT UR QL STRIP: NEGATIVE
PSA SERPL-MCNC: 1.66 NG/ML (ref 0–4)
RBC # BLD AUTO: 4.44 10*6/MM3 (ref 4.14–5.8)
RBC #/AREA URNS HPF: NORMAL /HPF (ref 0–2)
SODIUM SERPL-SCNC: 140 MMOL/L (ref 136–145)
SP GR UR STRIP: 1.02 (ref 1–1.03)
TRIGL SERPL-MCNC: 173 MG/DL (ref 0–150)
TSH SERPL DL<=0.005 MIU/L-ACNC: 5.12 UIU/ML (ref 0.27–4.2)
URINALYSIS REFLEX: ABNORMAL
UROBILINOGEN UR STRIP-MCNC: 1 MG/DL (ref 0.2–1)
VLDLC SERPL CALC-MCNC: 30 MG/DL (ref 5–40)
WBC # BLD AUTO: 5.77 10*3/MM3 (ref 3.4–10.8)
WBC #/AREA URNS HPF: NORMAL /HPF (ref 0–5)

## 2023-02-07 DIAGNOSIS — I10 PRIMARY HYPERTENSION: ICD-10-CM

## 2023-02-07 DIAGNOSIS — F51.02 ADJUSTMENT INSOMNIA: ICD-10-CM

## 2023-02-07 NOTE — TELEPHONE ENCOUNTER
Caller: Regina Martinez    Relationship: Self    Best call back number: [phone]     Requested Prescriptions:   Requested Prescriptions     Pending Prescriptions Disp Refills   • sertraline (ZOLOFT) 100 MG tablet 30 tablet 0   • losartan (COZAAR) 50 MG tablet 90 tablet 3     Sig: Take 1 tablet by mouth Every Night.   • zolpidem (AMBIEN) 5 MG tablet 20 tablet 0     Sig: Take 1 tablet by mouth At Night As Needed for Sleep.   • acyclovir (ZOVIRAX) 400 MG tablet 30 tablet 0     Sig: Take 1 tablet by mouth Daily. Take no more than 5 doses a day.        Pharmacy where request should be sent: [facility] [id number] [address] [phone] [facility] [phone] FX     Additional details provided by patient: PATIENT HAS CALLED REQUESTING NEW PRESCRIPTIONS WITH REFILLS ON ABOVE MEDICATIONS    Does the patient have less than a 3 day supply:  [x] Yes  [] No    Would you like a call back once the refill request has been completed: [] Yes [x] No    If the office needs to give you a call back, can they leave a voicemail: [] Yes [x] No    Hannah Ayala   02/07/23 11:49 EST

## 2023-02-07 NOTE — TELEPHONE ENCOUNTER
Rx Refill Note  Requested Prescriptions     Pending Prescriptions Disp Refills   • sertraline (ZOLOFT) 100 MG tablet 30 tablet 0   • losartan (COZAAR) 50 MG tablet 90 tablet 3     Sig: Take 1 tablet by mouth Every Night.   • zolpidem (AMBIEN) 5 MG tablet 20 tablet 0     Sig: Take 1 tablet by mouth At Night As Needed for Sleep.   • acyclovir (ZOVIRAX) 400 MG tablet 30 tablet 0     Sig: Take 1 tablet by mouth Daily. Take no more than 5 doses a day.      Last office visit with prescribing clinician: 2/1/2023   Last telemedicine visit with prescribing clinician: Visit date not found   Next office visit with prescribing clinician: Visit date not found                         Would you like a call back once the refill request has been completed: [] Yes [] No    If the office needs to give you a call back, can they leave a voicemail: [] Yes [] No    Sarah Hunt MA  02/07/23, 12:13 EST

## 2023-02-08 RX ORDER — SERTRALINE HYDROCHLORIDE 100 MG/1
100 TABLET, FILM COATED ORAL DAILY
Qty: 90 TABLET | Refills: 1 | Status: SHIPPED | OUTPATIENT
Start: 2023-02-08

## 2023-02-08 RX ORDER — LOSARTAN POTASSIUM 50 MG/1
50 TABLET ORAL NIGHTLY
Qty: 90 TABLET | Refills: 1 | Status: SHIPPED | OUTPATIENT
Start: 2023-02-08

## 2023-02-08 RX ORDER — ACYCLOVIR 400 MG/1
400 TABLET ORAL DAILY
Qty: 90 TABLET | Refills: 1 | Status: SHIPPED | OUTPATIENT
Start: 2023-02-08

## 2023-02-08 RX ORDER — ZOLPIDEM TARTRATE 5 MG/1
5 TABLET ORAL NIGHTLY PRN
Qty: 20 TABLET | Refills: 0 | Status: SHIPPED | OUTPATIENT
Start: 2023-02-08

## 2023-02-15 ENCOUNTER — TELEPHONE (OUTPATIENT)
Dept: FAMILY MEDICINE CLINIC | Facility: CLINIC | Age: 73
End: 2023-02-15
Payer: MEDICARE

## 2023-02-15 NOTE — TELEPHONE ENCOUNTER
"Hub/front can read     \"\"\"Maicol your recent lab results show your thyroid level still little bit low, your cholesterol good at 169, triglycerides borderline at 173, HDL normal at 46 and LDL normal at 93.  The fasting blood sugar run on the line at 100, kidney and liver function and potassium are normal.  The PSA for prostate normal at 1.66, complete blood count is normal.  Make sure you are taking your thyroid medicine on an empty stomach usually first thing in the morning, if you are doing this we may need to consider increasing your dose.\"\"\"\"  "

## 2023-03-15 RX ORDER — CLOPIDOGREL BISULFATE 75 MG/1
TABLET ORAL
Qty: 90 TABLET | Refills: 3 | Status: SHIPPED | OUTPATIENT
Start: 2023-03-15

## 2023-03-22 RX ORDER — BISOPROLOL FUMARATE 5 MG/1
TABLET, FILM COATED ORAL
Qty: 90 TABLET | Refills: 3 | Status: SHIPPED | OUTPATIENT
Start: 2023-03-22

## 2023-05-05 RX ORDER — EVOLOCUMAB 140 MG/ML
140 INJECTION, SOLUTION SUBCUTANEOUS
Qty: 2 ML | Refills: 11 | Status: SHIPPED | OUTPATIENT
Start: 2023-05-05

## 2023-05-09 RX ORDER — EVOLOCUMAB 140 MG/ML
140 INJECTION, SOLUTION SUBCUTANEOUS
Qty: 2 ML | Refills: 11 | Status: SHIPPED | OUTPATIENT
Start: 2023-05-09

## 2023-05-12 ENCOUNTER — OFFICE VISIT (OUTPATIENT)
Dept: CARDIOLOGY | Facility: CLINIC | Age: 73
End: 2023-05-12
Payer: MEDICARE

## 2023-05-12 VITALS
WEIGHT: 171 LBS | OXYGEN SATURATION: 95 % | HEART RATE: 56 BPM | BODY MASS INDEX: 25.33 KG/M2 | DIASTOLIC BLOOD PRESSURE: 60 MMHG | SYSTOLIC BLOOD PRESSURE: 98 MMHG | HEIGHT: 69 IN

## 2023-05-12 DIAGNOSIS — I10 ESSENTIAL HYPERTENSION: ICD-10-CM

## 2023-05-12 DIAGNOSIS — I25.10 CORONARY ARTERY DISEASE INVOLVING NATIVE CORONARY ARTERY OF NATIVE HEART WITHOUT ANGINA PECTORIS: Primary | ICD-10-CM

## 2023-05-12 DIAGNOSIS — I49.3 FREQUENT PVCS: ICD-10-CM

## 2023-05-12 DIAGNOSIS — E78.01 FAMILIAL HYPERCHOLESTEROLEMIA: ICD-10-CM

## 2023-05-12 RX ORDER — TIMOLOL MALEATE 5 MG/ML
SOLUTION/ DROPS OPHTHALMIC
COMMUNITY
Start: 2023-04-26

## 2023-05-15 PROBLEM — I25.118 CORONARY ARTERY DISEASE OF NATIVE ARTERY OF NATIVE HEART WITH STABLE ANGINA PECTORIS: Status: ACTIVE | Noted: 2017-03-31

## 2023-05-18 DIAGNOSIS — F51.02 ADJUSTMENT INSOMNIA: ICD-10-CM

## 2023-05-18 RX ORDER — LEVOTHYROXINE SODIUM 0.15 MG/1
TABLET ORAL
Qty: 30 TABLET | Refills: 2 | Status: SHIPPED | OUTPATIENT
Start: 2023-05-18

## 2023-05-18 NOTE — TELEPHONE ENCOUNTER
Caller: ReginaEnio    Relationship: Self    Best call back number: [phone]    Requested Prescriptions:   Requested Prescriptions     Pending Prescriptions Disp Refills   • zolpidem (AMBIEN) 5 MG tablet 20 tablet 0     Sig: Take 1 tablet by mouth At Night As Needed for Sleep.        Pharmacy where request should be sent: [facility] [id number] [address] [phone] [facility] [phone] FX     Last office visit with prescribing clinician: 2/1/2023   Last telemedicine visit with prescribing clinician: 5/18/2023   Next office visit with prescribing clinician: Visit date not found     Additional details provided by patient: PATIENT HAS CALLED REQUESTING A NEW PRESCRIPTION ON ABOVE MEDICATION. PATIENT IS ALSO REQUESTING IF SOMEONE CAN LET HIM KNOW WHY HE ONLY GETS 20 TABLETS AT A TIME WITH NO REFILLS.     Does the patient have less than a 3 day supply:  [x] Yes  [] No    Would you like a call back once the refill request has been completed: [x] Yes [] No    If the office needs to give you a call back, can they leave a voicemail: [x] Yes [] No    Hannah Ayala   05/18/23 11:23 EDT

## 2023-05-18 NOTE — TELEPHONE ENCOUNTER
Rx Refill Note  Requested Prescriptions     Pending Prescriptions Disp Refills   • zolpidem (AMBIEN) 5 MG tablet 20 tablet 0     Sig: Take 1 tablet by mouth At Night As Needed for Sleep.      Last office visit with prescribing clinician: 2/1/2023   Last telemedicine visit with prescribing clinician: 5/18/2023   Next office visit with prescribing clinician: Visit date not found                         Would you like a call back once the refill request has been completed: [] Yes [] No    If the office needs to give you a call back, can they leave a voicemail: [] Yes [] No    Alireza Crowley MA  05/18/23, 11:26 EDT

## 2023-05-18 NOTE — TELEPHONE ENCOUNTER
Rx Refill Note  Requested Prescriptions     Pending Prescriptions Disp Refills   • levothyroxine (SYNTHROID, LEVOTHROID) 150 MCG tablet [Pharmacy Med Name: LEVOTHYROXINE 150 MCG TABLET] 30 tablet 3     Sig: TAKE ONE TABLET BY MOUTH DAILY      Last office visit with prescribing clinician: 2/1/2023   Last telemedicine visit with prescribing clinician: 2/15/2023   Next office visit with prescribing clinician: Visit date not found                         Would you like a call back once the refill request has been completed: [] Yes [] No    If the office needs to give you a call back, can they leave a voicemail: [] Yes [] No    Alireza Crowley MA  05/18/23, 07:31 EDT

## 2023-05-19 RX ORDER — ZOLPIDEM TARTRATE 5 MG/1
5 TABLET ORAL NIGHTLY PRN
Qty: 20 TABLET | Refills: 0 | Status: SHIPPED | OUTPATIENT
Start: 2023-05-19

## 2023-05-22 RX ORDER — ISOSORBIDE MONONITRATE 60 MG/1
TABLET, EXTENDED RELEASE ORAL
Qty: 90 TABLET | Refills: 3 | Status: SHIPPED | OUTPATIENT
Start: 2023-05-22

## 2023-06-12 ENCOUNTER — HOSPITAL ENCOUNTER (OUTPATIENT)
Dept: CARDIOLOGY | Facility: HOSPITAL | Age: 73
Discharge: HOME | End: 2023-06-12
Payer: MEDICARE

## 2023-06-12 VITALS
WEIGHT: 171 LBS | DIASTOLIC BLOOD PRESSURE: 88 MMHG | HEIGHT: 69 IN | HEART RATE: 59 BPM | SYSTOLIC BLOOD PRESSURE: 110 MMHG | BODY MASS INDEX: 25.33 KG/M2

## 2023-06-12 DIAGNOSIS — I25.10 CORONARY ARTERY DISEASE INVOLVING NATIVE CORONARY ARTERY OF NATIVE HEART WITHOUT ANGINA PECTORIS: ICD-10-CM

## 2023-06-12 LAB
BH CV REST NUCLEAR ISOTOPE DOSE: 9.7 MCI
BH CV STRESS BP STAGE 1: NORMAL
BH CV STRESS DURATION MIN STAGE 1: 3
BH CV STRESS DURATION MIN STAGE 2: 2
BH CV STRESS DURATION SEC STAGE 1: 0
BH CV STRESS DURATION SEC STAGE 2: 8
BH CV STRESS GRADE STAGE 1: 10
BH CV STRESS GRADE STAGE 2: 12
BH CV STRESS HR STAGE 1: 112
BH CV STRESS HR STAGE 2: 115
BH CV STRESS METS STAGE 1: 5
BH CV STRESS METS STAGE 2: 7.5
BH CV STRESS NUCLEAR ISOTOPE DOSE: 30.1 MCI
BH CV STRESS O2 STAGE 1: 95
BH CV STRESS O2 STAGE 2: 96
BH CV STRESS PROTOCOL 1: NORMAL
BH CV STRESS RECOVERY BP: NORMAL MMHG
BH CV STRESS RECOVERY HR: 77 BPM
BH CV STRESS RECOVERY O2: 96 %
BH CV STRESS SPEED STAGE 1: 1.7
BH CV STRESS SPEED STAGE 2: 2.5
BH CV STRESS STAGE 1: 1
BH CV STRESS STAGE 2: 2
LV EF NUC BP: 55 %
MAXIMAL PREDICTED HEART RATE: 146 BPM
PERCENT MAX PREDICTED HR: 85.62 %
STRESS BASELINE BP: NORMAL MMHG
STRESS BASELINE HR: 59 BPM
STRESS O2 SAT REST: 95 %
STRESS PERCENT HR: 101 %
STRESS POST ESTIMATED WORKLOAD: 6 METS
STRESS POST EXERCISE DUR MIN: 5 MIN
STRESS POST EXERCISE DUR SEC: 8 SEC
STRESS POST O2 SAT PEAK: 96 %
STRESS POST PEAK BP: NORMAL MMHG
STRESS POST PEAK HR: 125 BPM
STRESS TARGET HR: 124 BPM

## 2023-06-12 PROCEDURE — 78452 HT MUSCLE IMAGE SPECT MULT: CPT

## 2023-06-12 PROCEDURE — 0 TECHNETIUM SESTAMIBI

## 2023-06-12 PROCEDURE — 93017 CV STRESS TEST TRACING ONLY: CPT

## 2023-06-12 PROCEDURE — A9500 TC99M SESTAMIBI: HCPCS

## 2023-06-12 RX ADMIN — TECHNETIUM TC 99M SESTAMIBI 1 DOSE: 1 INJECTION INTRAVENOUS at 14:25

## 2023-06-12 RX ADMIN — TECHNETIUM TC 99M SESTAMIBI 1 DOSE: 1 INJECTION INTRAVENOUS at 12:15

## 2023-08-14 RX ORDER — LEVOTHYROXINE SODIUM 0.15 MG/1
150 TABLET ORAL DAILY
Qty: 30 TABLET | Refills: 0 | Status: SHIPPED | OUTPATIENT
Start: 2023-08-14

## 2023-09-08 NOTE — TELEPHONE ENCOUNTER
Caller: Enio Tapia    Relationship: Self    Best call back number: [phone]     Requested Prescriptions:   Requested Prescriptions     Pending Prescriptions Disp Refills    acyclovir (ZOVIRAX) 400 MG tablet 90 tablet 1     Sig: Take 1 tablet by mouth Daily. Take no more than 5 doses a day.    sertraline (ZOLOFT) 100 MG tablet 90 tablet 1     Sig: Take 1 tablet by mouth Daily.      Pharmacy where request should be sent: [facility] [id number] [address] [phone] [place] [phone]      Last office visit with prescribing clinician: Visit date not found   Last telemedicine visit with prescribing clinician: Visit date not found   Next office visit with prescribing clinician: Visit date not found     Additional details provided by patient: THE PATIENT WAS PREVIOUSLY ESTABLISHED WITH DR. MURGUIA. HE IS SET UP TO START SEEING DR. MOSS IN FEBRUARY FOR HIS WELLNESS AFTER DR. MURGUIA LEFT. THE PATIENT TALKED TO THE PHARMACY. THEY SAID THEY HAVE TRIED REACHING THE OFFICE  FOR 10 DAYS. THEY HAVE NOT GOTTEN A RESPONSE. THE PATIENT HAS BEEN OUT OF HIS MEDICATION FOR A WHILE AND IS NEEDING IT ASAP. HE IS ASKING THAT THIS BE SENT IN TODAY 09.08.23 IF POSSIBLE. PLEASE CALL HIM ASAP IF THERE ARE ISSUES AND CONCERNS.     Does the patient have less than a 3 day supply:  [x] Yes  [] No    Would you like a call back once the refill request has been completed: [x] Yes [] No    If the office needs to give you a call back, can they leave a voicemail: [x] Yes [] No    Arianna Willis   09/08/23 12:46 EDT

## 2023-09-09 RX ORDER — SERTRALINE HYDROCHLORIDE 100 MG/1
100 TABLET, FILM COATED ORAL DAILY
Qty: 30 TABLET | Refills: 0 | Status: SHIPPED | OUTPATIENT
Start: 2023-09-09

## 2023-09-09 RX ORDER — ACYCLOVIR 400 MG/1
400 TABLET ORAL DAILY
Qty: 30 TABLET | Refills: 0 | Status: SHIPPED | OUTPATIENT
Start: 2023-09-09

## 2023-09-11 RX ORDER — LEVOTHYROXINE SODIUM 0.15 MG/1
150 TABLET ORAL DAILY
Qty: 30 TABLET | Refills: 0 | Status: SHIPPED | OUTPATIENT
Start: 2023-09-11

## 2023-09-11 NOTE — TELEPHONE ENCOUNTER
Rx Refill Note  Requested Prescriptions     Pending Prescriptions Disp Refills    levothyroxine (SYNTHROID, LEVOTHROID) 150 MCG tablet [Pharmacy Med Name: LEVOTHYROXINE 150 MCG TABLET] 30 tablet 0     Sig: TAKE 1 TABLET BY MOUTH DAILY      Last office visit with prescribing clinician: Visit date not found   Last telemedicine visit with prescribing clinician: Visit date not found   Next office visit with prescribing clinician: Visit date not found                         Would you like a call back once the refill request has been completed: [] Yes [] No    If the office needs to give you a call back, can they leave a voicemail: [] Yes [] No    Phyllis Frank MA  09/11/23, 10:51 EDT     Patient to be discharged from ED. Any available test results were discussed with patient and/or family. Verbal instructions given, including instructions to return to ED immediately for any new, worsening, or concerning symptoms. Patient endorsed understanding. Written discharge instructions additionally given, including follow-up plan.

## 2023-09-11 NOTE — TELEPHONE ENCOUNTER
Rx Refill Note  Requested Prescriptions     Pending Prescriptions Disp Refills    levothyroxine (SYNTHROID, LEVOTHROID) 150 MCG tablet [Pharmacy Med Name: LEVOTHYROXINE 150 MCG TABLET] 30 tablet 0     Sig: TAKE 1 TABLET BY MOUTH DAILY      Last office visit with prescribing clinician: Visit date not found   Last telemedicine visit with prescribing clinician: Visit date not found   Next office visit with prescribing clinician: Visit date not found                         Would you like a call back once the refill request has been completed: [] Yes [] No    If the office needs to give you a call back, can they leave a voicemail: [] Yes [] No    Brigid Fink LPN  09/11/23, 09:30 EDT

## 2023-09-15 RX ORDER — SERTRALINE HYDROCHLORIDE 100 MG/1
100 TABLET, FILM COATED ORAL DAILY
Qty: 30 TABLET | Refills: 0 | Status: CANCELLED | OUTPATIENT
Start: 2023-09-15

## 2023-09-15 RX ORDER — ACYCLOVIR 400 MG/1
400 TABLET ORAL DAILY
Qty: 30 TABLET | Refills: 0 | Status: CANCELLED | OUTPATIENT
Start: 2023-09-15

## 2023-09-15 NOTE — TELEPHONE ENCOUNTER
Caller: Regina Martinez    Relationship: Self    Best call back number: [phone]     Requested Prescriptions:   Requested Prescriptions     Pending Prescriptions Disp Refills    acyclovir (ZOVIRAX) 400 MG tablet 30 tablet 0     Sig: Take 1 tablet by mouth Daily. Take no more than 5 doses a day.    sertraline (ZOLOFT) 100 MG tablet 30 tablet 0     Sig: Take 1 tablet by mouth Daily.        Pharmacy where request should be sent: [facility] [id number] [address] [phone] [place] [phone]      Last office visit with prescribing clinician: Visit date not found   Last telemedicine visit with prescribing clinician: Visit date not found   Next office visit with prescribing clinician: 2/14/2024     Additional details provided by patient: PATIENT IS OUT OF MEDICATION. STATES THAT PHARMACY SHOWS NO RECORD OF    Does the patient have less than a 3 day supply:  [x] Yes  [] No    Would you like a call back once the refill request has been completed: [] Yes [x] No    If the office needs to give you a call back, can they leave a voicemail: [] Yes [x] No    Arianna Alegre Rep   09/15/23 14:42 EDT

## 2023-09-19 RX ORDER — ACYCLOVIR 400 MG/1
400 TABLET ORAL DAILY
Qty: 30 TABLET | Refills: 0 | Status: CANCELLED | OUTPATIENT
Start: 2023-09-19

## 2023-09-19 RX ORDER — SERTRALINE HYDROCHLORIDE 100 MG/1
100 TABLET, FILM COATED ORAL DAILY
Qty: 30 TABLET | Refills: 0 | Status: CANCELLED | OUTPATIENT
Start: 2023-09-19

## 2023-09-19 NOTE — TELEPHONE ENCOUNTER
Rx Refill Note  Requested Prescriptions     Pending Prescriptions Disp Refills    acyclovir (ZOVIRAX) 400 MG tablet 30 tablet 0     Sig: Take 1 tablet by mouth Daily. Take no more than 5 doses a day.    sertraline (ZOLOFT) 100 MG tablet 30 tablet 0     Sig: Take 1 tablet by mouth Daily.      Last office visit with prescribing clinician: Visit date not found   Last telemedicine visit with prescribing clinician: Visit date not found   Next office visit with prescribing clinician: Visit date not found                         Would you like a call back once the refill request has been completed: [] Yes [] No    If the office needs to give you a call back, can they leave a voicemail: [] Yes [] No    Phyllis Frank MA  09/19/23, 14:15 EDT

## 2023-09-19 NOTE — TELEPHONE ENCOUNTER
Patient has called several times and pharmacy has never received prescription. Eleanor looked at it and it was routed to a printer and not pharmacy

## 2023-09-21 RX ORDER — SERTRALINE HYDROCHLORIDE 100 MG/1
100 TABLET, FILM COATED ORAL DAILY
Qty: 30 TABLET | Refills: 0 | Status: SHIPPED | OUTPATIENT
Start: 2023-09-21 | End: 2023-09-22 | Stop reason: SDUPTHER

## 2023-09-21 RX ORDER — SERTRALINE HYDROCHLORIDE 100 MG/1
100 TABLET, FILM COATED ORAL DAILY
Qty: 30 TABLET | Refills: 0 | Status: SHIPPED | OUTPATIENT
Start: 2023-09-21 | End: 2023-09-21 | Stop reason: SDUPTHER

## 2023-09-21 RX ORDER — ACYCLOVIR 400 MG/1
400 TABLET ORAL DAILY
Qty: 30 TABLET | Refills: 0 | Status: SHIPPED | OUTPATIENT
Start: 2023-09-21 | End: 2023-09-21 | Stop reason: SDUPTHER

## 2023-09-21 RX ORDER — ACYCLOVIR 400 MG/1
400 TABLET ORAL DAILY
Qty: 30 TABLET | Refills: 0 | Status: SHIPPED | OUTPATIENT
Start: 2023-09-21 | End: 2023-09-22 | Stop reason: SDUPTHER

## 2023-09-21 NOTE — TELEPHONE ENCOUNTER
Hub staff attempted to follow warm transfer process and was unsuccessful     Caller: Enio Tapia    Relationship to patient: Self    Best call back number: [phone]     Patient is needing: PATIENT IS HAVING ISSUES GETTING THE FOLLOWING MEDICATIONS CALLED IN:      -acyclovir (ZOVIRAX) 400 MG tablet      -sertraline (ZOLOFT) 100 MG tablet         PATIENT IS REQUESTING A WRITTEN PRESCRIPTION FOR THESE MEDICATIONS SO HE CAN COME BY THE OFFICE TODAY AND PICK THEM UP.  PATIENT WOULD LIKE FOR QUENTIN TO CONTACT HIM WHEN THESE ARE AVAILABLE FOR

## 2023-09-22 RX ORDER — SERTRALINE HYDROCHLORIDE 100 MG/1
100 TABLET, FILM COATED ORAL DAILY
Qty: 30 TABLET | Refills: 0 | Status: SHIPPED | OUTPATIENT
Start: 2023-09-22

## 2023-09-22 RX ORDER — ACYCLOVIR 400 MG/1
400 TABLET ORAL DAILY
Qty: 30 TABLET | Refills: 0 | Status: SHIPPED | OUTPATIENT
Start: 2023-09-22

## 2024-03-08 RX ORDER — CLOPIDOGREL BISULFATE 75 MG/1
TABLET ORAL
Qty: 90 TABLET | Refills: 3 | Status: SHIPPED | OUTPATIENT
Start: 2024-03-08

## 2024-03-19 RX ORDER — BISOPROLOL FUMARATE 5 MG/1
TABLET, FILM COATED ORAL
Qty: 90 TABLET | Refills: 0 | Status: SHIPPED | OUTPATIENT
Start: 2024-03-19

## 2024-05-16 RX ORDER — ISOSORBIDE MONONITRATE 60 MG/1
60 TABLET, EXTENDED RELEASE ORAL EVERY MORNING
Qty: 90 TABLET | Refills: 3 | Status: SHIPPED | OUTPATIENT
Start: 2024-05-16

## 2024-10-29 ENCOUNTER — TELEPHONE (OUTPATIENT)
Dept: CARDIOLOGY | Facility: CLINIC | Age: 74
End: 2024-10-29
Payer: MEDICARE

## 2024-10-29 NOTE — TELEPHONE ENCOUNTER
Caller: Enio Tapia    Relationship to patient: Self    Best call back number: [phone]    Chief complaint: PATIENT HAS NOT BEEN SEEN SINCE 05.12.23. HE HAS [employer] AND WE WERE OUT OF NETWORK AT HIS LAST AGUILA. PER HUB WORKFLOW, FURTHER REVIEW IS NEEDED. PLEASE REACH OUT TO PT TO SCHEDULE.    Type of visit: FOLLOW UP    Requested date: ASAP     If rescheduling, when is the original appointment: 01.05.24     Additional notes:

## 2024-11-08 ENCOUNTER — OFFICE VISIT (OUTPATIENT)
Dept: CARDIOLOGY | Facility: CLINIC | Age: 74
End: 2024-11-08
Payer: MEDICARE

## 2024-11-08 VITALS
SYSTOLIC BLOOD PRESSURE: 112 MMHG | WEIGHT: 156 LBS | DIASTOLIC BLOOD PRESSURE: 66 MMHG | BODY MASS INDEX: 23.11 KG/M2 | HEART RATE: 50 BPM | HEIGHT: 69 IN | OXYGEN SATURATION: 96 %

## 2024-11-08 DIAGNOSIS — I10 ESSENTIAL HYPERTENSION: ICD-10-CM

## 2024-11-08 DIAGNOSIS — E78.5 DYSLIPIDEMIA: ICD-10-CM

## 2024-11-08 DIAGNOSIS — I25.10 CORONARY ARTERY DISEASE INVOLVING NATIVE CORONARY ARTERY OF NATIVE HEART WITHOUT ANGINA PECTORIS: Primary | ICD-10-CM

## 2024-11-08 PROCEDURE — 3078F DIAST BP <80 MM HG: CPT | Performed by: INTERNAL MEDICINE

## 2024-11-08 PROCEDURE — 99214 OFFICE O/P EST MOD 30 MIN: CPT | Performed by: INTERNAL MEDICINE

## 2024-11-08 PROCEDURE — 93000 ELECTROCARDIOGRAM COMPLETE: CPT | Performed by: INTERNAL MEDICINE

## 2024-11-08 PROCEDURE — 3074F SYST BP LT 130 MM HG: CPT | Performed by: INTERNAL MEDICINE

## 2024-11-08 PROCEDURE — 1159F MED LIST DOCD IN RCRD: CPT | Performed by: INTERNAL MEDICINE

## 2024-11-08 PROCEDURE — 1160F RVW MEDS BY RX/DR IN RCRD: CPT | Performed by: INTERNAL MEDICINE

## 2024-11-08 RX ORDER — EVOLOCUMAB 140 MG/ML
140 INJECTION, SOLUTION SUBCUTANEOUS
Qty: 2 ML | Refills: 11 | Status: SHIPPED | OUTPATIENT
Start: 2024-11-08

## 2024-11-08 RX ORDER — ICOSAPENT ETHYL 1 G/1
2 CAPSULE ORAL 2 TIMES DAILY WITH MEALS
Qty: 120 CAPSULE | Refills: 11 | Status: SHIPPED | OUTPATIENT
Start: 2024-11-08

## 2024-11-08 RX ORDER — CALCIUM CITRATE/VITAMIN D3 200MG-6.25
TABLET ORAL
COMMUNITY

## 2024-11-08 NOTE — PROGRESS NOTES
"[facility] Cardiology    Encounter Date: 2024    Patient ID: Enio Tapia is a 74 y.o. male.  : 1950     PCP: Robert Hubbard MD       Chief Complaint: Coronary Artery Disease      PROBLEM LIST:  Coronary artery disease  As/P3 0.5 x 32 Taxus JOHANA mid RCA lesion 10/15/04  Normal stress echo 2010  Echo 2010 showed normal EF of 60% with trace TR and MR.  [facility] 2017: For unstable angina: Diffuse 70% stenosis of mid LAD noted.  Stented with Xience 3.0 x 33 JOHANA reducing stenosis to 0%.  Also 70% discrete stenosis in proximal first diagonal stented with Xience Alpine 2.25 x 15 JOHANA reducing stenosis to 0%.  Previous stenting proximal to mid RCA widely patent. Normal LVEF.  Echo 2017: EF 55%.  Mild LVH noted.  [facility], 2020: A small-to-medium-sized infarct located in the inferior wall and septal wall with mild valarie-infarct ischemia. The test is remarkable for brief \"chest burning\" which resolved in early recovery. EF 71%.  [facility], 2020: EF 60%. Severe disease of the distal RCA continuing into the proximal PDA; stents reducing the 80% stenosis to 0%. Moderate disease of the proximal RCA, stented with 4.0 x 19 mm JOHANA and reduced to 0%. 80% stenosis of the PLV branch of the RCA reduced to less than 40%. Nonobstructive 40% stenosis of the proximal circumflex.   [facility], 2023: EF 55%. A small-sized basal inferior septal infarct with no significant ischemia noted.   PVCs:  24h Holter, 2018: Min HR 61, mean 70, max 108. 25,091 PVCs, mostly bigeminy.  PVC ablation, 2018 - 1. Successful catheter mapping ablation of 2 ventricular foci. 2. Induction of ectopy was difficult. Less than 10 ectopic beats were able to be induced over a 2 hour time period.  48h Holter, 2019: 80532 PVCs, 11.8% burden  Hypertension  Hyperlipidemia  Hypothyroidism  Anxiety disorder  Glaucoma  Osteoarthritis  GRABIEL with CPAP compliance.  History of horse bite 2018 on the " right side of the chest, with severe chest trauma.    History of Present Illness  Patient presents today for a follow-up with a history of CAD and cardiac risk factors. Since last visit, he has done very well from cardiac standpoint.  He came after a long gap due to troubles with his [employer] insurance.  States that meanwhile his PCP was filling his medications.  Patient cannot take statins due to severe myalgias however was on Repatha which improved his total cholesterol and LDL however his triglycerides went up to 330.  He stopped taking the Repatha for about 6 months on the repeat labs showed significant improvement of LDL and total cholesterol but HDL and triglycerides improved.  Patient is reluctant to go back on biweekly Repatha and wonders if something different can be done.  He is very active with household and yard work and also walks regularly.  Has no current chest pain shortness of breath edema palpitations or syncope.    Allergies   Allergen Reactions    Pravastatin Myalgia    Statins Myalgia     ESPECIALLY LIPITOR         Current Outpatient Medications:     acyclovir (ZOVIRAX) 400 MG tablet, Take 1 tablet by mouth Daily. Take no more than 5 doses a day., Disp: 30 tablet, Rfl: 0    aspirin 81 MG EC tablet, Take 1 tablet by mouth Every Night., Disp: , Rfl:     bisoprolol (ZEBeta) 5 MG tablet, TAKE ONE TABLET BY MOUTH DAILY, Disp: 90 tablet, Rfl: 0    Calcium Citrate-Vitamin D (Citrus Calcium/Vitamin D) 200-6.25 MG-MCG tablet, Take  by mouth., Disp: , Rfl:     clopidogrel (PLAVIX) 75 MG tablet, TAKE ONE TABLET BY MOUTH DAILY, Disp: 90 tablet, Rfl: 3    Evolocumab (Repatha SureClick) solution auto-injector SureClick injection, Inject 1 mL under the skin into the appropriate area as directed Every 14 (Fourteen) Days., Disp: 2 mL, Rfl: 11    isosorbide mononitrate (IMDUR) 60 MG 24 hr tablet, Take 1 tablet by mouth Every Morning. Needs F?U appointment and lab work for further refills., Disp: 90 tablet, Rfl: 3    " levothyroxine (SYNTHROID, LEVOTHROID) 150 MCG tablet, TAKE 1 TABLET BY MOUTH DAILY, Disp: 30 tablet, Rfl: 0    losartan (COZAAR) 50 MG tablet, Take 1 tablet by mouth Every Night., Disp: 90 tablet, Rfl: 1    Multiple Vitamins-Minerals (CENTRUM SILVER PO), Take 1 tablet by mouth Daily., Disp: , Rfl:     nitroglycerin (NITROSTAT) 0.4 MG SL tablet, 1 under the tongue as needed for angina, may repeat q5mins for up three doses, Disp: 100 tablet, Rfl: 11    sertraline (ZOLOFT) 100 MG tablet, Take 1 tablet by mouth Daily., Disp: 30 tablet, Rfl: 0    timolol (TIMOPTIC) 0.5 % ophthalmic solution, , Disp: , Rfl:     travoprost, BAK free, (TRAVATAN) 0.004 % solution ophthalmic solution, Administer 1 drop to both eyes Every Evening. in affected eye(s), Disp: , Rfl:     zolpidem (AMBIEN) 5 MG tablet, Take 1 tablet by mouth At Night As Needed for Sleep., Disp: 20 tablet, Rfl: 0    icosapent ethyl (Vascepa) 1 g capsule capsule, Take 2 g by mouth 2 (Two) Times a Day With Meals., Disp: 120 capsule, Rfl: 11    The following portions of the patient's history were reviewed and updated as appropriate: allergies, current medications, past family history, past medical history, past social history, past surgical history and problem list.    ROS  Review of Systems   14 point ROS negative except for that listed in the HPI.         Objective:     /66 (BP Location: Right arm, Patient Position: Sitting)   Pulse 50   Ht 175.3 cm (69\")   Wt 70.8 kg (156 lb)   SpO2 96%   BMI 23.04 kg/m²      Physical Exam  Constitutional: Patient appears well-developed and well-nourished.   HENT: HEENT exam unremarkable.   Neck: Neck supple. No JVD present. No carotid bruits.   Cardiovascular: Normal rate, regular rhythm and normal heart sounds. No murmur heard.   2+ symmetric pulses.   Pulmonary/Chest: Breath sounds normal. Does not exhibit tenderness.   Abdominal: Abdomen benign.   Musculoskeletal: Does not exhibit edema.   Neurological: Neurological " exam unremarkable.   Vitals reviewed.    Data Review:   Labs from September 26, 2023 showed: Total cholesterol 120 triglycerides 330 HDL 36 LDL 18.  Labs from June 11, 2024 showed total cholesterol 269 triglycerides 170 HDL 44        ECG 12 Lead    Date/Time: 11/8/2024 11:58 AM  Performed by: Candy Ribeiro MD    Authorized by: Candy Ribeiro MD  Comparison: compared with previous ECG from 5/12/2023  Similar to previous ECG  Rhythm: sinus tachycardia    Clinical impression: normal ECG           Advance Care Planning   ACP discussion was declined by the patient. Patient does not have an advance directive, declines further assistance.           Assessment:      Diagnosis Plan   1. Coronary artery disease involving native coronary artery of native heart without angina pectoris  Continue current dual antiplatelet therapy and rest of the current GDMT.      2. Essential hypertension  Well-controlled, continue losartan and bisoprolol.      3. Dyslipidemia  Restart Repatha, okay to take it once a month and we will further add Vascepa 2 g twice daily.  Recheck lipid panel in 3 months.        Plan:   Stable cardiac status.  No angina or CHF symptoms.  Restart Repatha once a month, add Vascepa 2 g twice daily.  Continue rest of the current medications.   FU in 6 MO, sooner as needed.  Thank you for allowing us to participate in the care of your patient.       Candy Ribeiro MD, FACC, [facility]        Please note that portions of this note may have been completed with a voice recognition program. Efforts were made to edit the dictations, but occasionally words are mistranscribed.

## 2025-03-03 RX ORDER — CLOPIDOGREL BISULFATE 75 MG/1
75 TABLET ORAL DAILY
Qty: 90 TABLET | Refills: 3 | Status: SHIPPED | OUTPATIENT
Start: 2025-03-03

## 2025-05-09 RX ORDER — ISOSORBIDE MONONITRATE 60 MG/1
TABLET, EXTENDED RELEASE ORAL
Qty: 90 TABLET | Refills: 1 | Status: SHIPPED | OUTPATIENT
Start: 2025-05-09

## (undated) DEVICE — BALN PRESS WEDGE 6F 110CM

## (undated) DEVICE — MODEL AT P65, P/N 701554-001KIT CONTENTS: HAND CONTROLLER, 3-WAY HIGH-PRESSURE STOPCOCK WITH ROTATING END AND PREMIUM HIGH-PRESSURE TUBING: Brand: ANGIOTOUCH® KIT

## (undated) DEVICE — ENCORE® LATEX MICRO SIZE 8, STERILE LATEX POWDER-FREE SURGICAL GLOVE: Brand: ENCORE

## (undated) DEVICE — Device

## (undated) DEVICE — 2963 MEDIPORE SOFT CLOTH TAPE 3 IN X 10 YD 12 RLS/CS: Brand: 3M™ MEDIPORE™

## (undated) DEVICE — SUCTION RESERVOIR 400CC LG VOL: Brand: CARDINAL HEALTH

## (undated) DEVICE — PK CATH CARD 10

## (undated) DEVICE — GW INQWIRE FC PTFE STD J/1.5 .035 260

## (undated) DEVICE — SET PRIMARY GRVTY 10DP MALE LL 104IN

## (undated) DEVICE — RADIFOCUS GLIDEWIRE: Brand: GLIDEWIRE

## (undated) DEVICE — SENSR O2 OXIMAX FNGR A/ 18IN NONSTR

## (undated) DEVICE — A2000 MULTI-USE SYRINGE KIT, P/N 701277-003KIT CONTENTS: 100ML CONTRAST RESERVOIR AND TUBING WITH CONTRAST SPIKE AND CLAMP: Brand: A2000 MULTI-USE SYRINGE KIT

## (undated) DEVICE — CATH DIAG EXPO M/ PK 5F FL4/FR4 PIG

## (undated) DEVICE — NC TREK CORONARY DILATATION CATHETER 3.0 MM X 20 MM / RAPID-EXCHANGE: Brand: NC TREK

## (undated) DEVICE — ST INF PRI SMRTSTE 20DRP 2VLV 24ML 117

## (undated) DEVICE — INTRO SHEATH PRELUDE IDEAL SPRNG COIL 021 6F 23X80CM

## (undated) DEVICE — MODEL BT2000 P/N 700287-012KIT CONTENTS: MANIFOLD WITH SALINE AND CONTRAST PORTS, SALINE TUBING WITH SPIKE AND HAND SYRINGE, TRANSDUCER: Brand: BT2000 AUTOMATED MANIFOLD KIT

## (undated) DEVICE — SI AVANTI+ 5F MID W/O GW&OBT: Brand: AVANTI

## (undated) DEVICE — SKIN PREP TRAY W/CHG: Brand: MEDLINE INDUSTRIES, INC.

## (undated) DEVICE — INTRO STEER AGILIS NXT MD/CURL 8.5F 61CM

## (undated) DEVICE — LIMB HOLDERS: Brand: DEROYAL

## (undated) DEVICE — MINI TREK CORONARY DILATATION CATHETER 2.0 MM X 12 MM / RAPID-EXCHANGE: Brand: MINI TREK

## (undated) DEVICE — GUIDE CATHETER: Brand: MACH1™

## (undated) DEVICE — CANNULA,ADULT,SOFT-TOUCH,7'TUBE,UC: Brand: PENDING

## (undated) DEVICE — CVR HNDL LT SURG ACCSSRY BLU STRL

## (undated) DEVICE — STERILE LATEX POWDER FREE SURGICAL GLOVES WITH HYDROGEL COATING: Brand: PROTEXIS

## (undated) DEVICE — DECANT BG O JET

## (undated) DEVICE — DRSNG WND GZ PAD BORDERED 4X8IN STRL

## (undated) DEVICE — ADULT, W/LG. BACK PAD, RADIOTRANSPARENT ELEMENT AND LEAD WIRE: Brand: DEFIBRILLATION ELECTRODES

## (undated) DEVICE — DEV INFL MONARCH 25W

## (undated) DEVICE — GW FC FLOP/TP .035 260CM 3MM

## (undated) DEVICE — JP 19FR SILICONE DRAIN: Brand: CARDINAL HEALTH

## (undated) DEVICE — SUT VIC 2/0 CT2 27IN J269H

## (undated) DEVICE — CATH DIAG EXPO .045 FL3  5F 100CM

## (undated) DEVICE — CATH QUAD JSN 5F 5MM 120CM

## (undated) DEVICE — MEDI-VAC NON-CONDUCTIVE SUCTION TUBING: Brand: CARDINAL HEALTH

## (undated) DEVICE — HI-TORQUE VERSATURN F GUIDE WIRE FULLY COATED .014 STRAIGHT TIP 190 CM: Brand: HI-TORQUE VERSATURN

## (undated) DEVICE — PK THORACOTOMY 10

## (undated) DEVICE — CATH COURNARD DECCA CSL 6F120CM

## (undated) DEVICE — CONTAINER,SPECIMEN,OR STERILE,4OZ: Brand: MEDLINE

## (undated) DEVICE — GW EXCHG TSCF .035 260CM 3MM

## (undated) DEVICE — ANTIBACTERIAL UNDYED BRAIDED (POLYGLACTIN 910), SYNTHETIC ABSORBABLE SURGICAL SUTURE: Brand: COATED VICRYL

## (undated) DEVICE — TEMPERATURE ABLATION CATHETER: Brand: BLAZER® II

## (undated) DEVICE — CATH ADVISOR HD GRID MAP SENSR ENABLED

## (undated) DEVICE — PROXIMATE RH ROTATING HEAD SKIN STAPLERS (35 WIDE) CONTAINS 35 STAINLESS STEEL STAPLES: Brand: PROXIMATE

## (undated) DEVICE — GW AMPLATZER SS .035 1.5MM J 260CM

## (undated) DEVICE — DRSNG SURESITE123 4X4.8IN

## (undated) DEVICE — ST EXT IV SMARTSITE 2VLV SP M LL 5ML IV1

## (undated) DEVICE — DECANTER: Brand: UNBRANDED

## (undated) DEVICE — HANDPIECE SET WITH HIGH FLOW TIP AND SUCTION TUBE: Brand: INTERPULSE

## (undated) DEVICE — GLIDESHEATH SLENDER STAINLESS STEEL KIT: Brand: GLIDESHEATH SLENDER

## (undated) DEVICE — CATH IMG IVUS EAGLE EYE PLATIN RX DIGITAL .014IN 5FR

## (undated) DEVICE — BALN NC/EUPHORA RX 3.00X15MM

## (undated) DEVICE — TREK CORONARY DILATATION CATHETER 2.50 MM X 15 MM / RAPID-EXCHANGE: Brand: TREK

## (undated) DEVICE — CATH DIAG EXPO .045 FL3.5 5F 100CM

## (undated) DEVICE — Device: Brand: ASAHI SION BLUE

## (undated) DEVICE — SPNG GZ WOVN 4X4IN 12PLY 10/BX STRL

## (undated) DEVICE — DEV COMP RAD PRELUDESYNC 24CM

## (undated) DEVICE — SI AVANTI+ 8F STD W/GW  NO OBT: Brand: AVANTI

## (undated) DEVICE — SI AVANTI+ 7F STD W/GW  NO OBT: Brand: AVANTI

## (undated) DEVICE — TR BAND RADIAL ARTERY COMPRESSION DEVICE: Brand: TR BAND

## (undated) DEVICE — LEX ELECTRO PHYSIOLOGY: Brand: MEDLINE INDUSTRIES, INC.

## (undated) DEVICE — MODEL AT P54, P/N 700608-035KIT CONTENTS: HAND CONTROLLER, 3-WAY HIGH-PRESSURE STOPCOCK WITH ROTATING END AND PREMIUM HIGH-PRESSURE TUBING: Brand: ANGIOTOUCH® KIT

## (undated) DEVICE — AIRWY SZ11